# Patient Record
Sex: FEMALE | Race: WHITE | NOT HISPANIC OR LATINO | Employment: OTHER | URBAN - METROPOLITAN AREA
[De-identification: names, ages, dates, MRNs, and addresses within clinical notes are randomized per-mention and may not be internally consistent; named-entity substitution may affect disease eponyms.]

---

## 2017-03-23 ENCOUNTER — OFFICE VISIT (OUTPATIENT)
Dept: FAMILY MEDICINE | Facility: CLINIC | Age: 72
End: 2017-03-23
Payer: COMMERCIAL

## 2017-03-23 VITALS
TEMPERATURE: 98.2 F | DIASTOLIC BLOOD PRESSURE: 66 MMHG | HEIGHT: 65 IN | HEART RATE: 64 BPM | OXYGEN SATURATION: 98 % | BODY MASS INDEX: 21.33 KG/M2 | SYSTOLIC BLOOD PRESSURE: 118 MMHG | WEIGHT: 128 LBS

## 2017-03-23 DIAGNOSIS — L57.0 AK (ACTINIC KERATOSIS): ICD-10-CM

## 2017-03-23 DIAGNOSIS — Z00.00 ROUTINE GENERAL MEDICAL EXAMINATION AT A HEALTH CARE FACILITY: ICD-10-CM

## 2017-03-23 DIAGNOSIS — Z12.83 SKIN CANCER SCREENING: ICD-10-CM

## 2017-03-23 DIAGNOSIS — Z11.59 NEED FOR HEPATITIS C SCREENING TEST: ICD-10-CM

## 2017-03-23 DIAGNOSIS — H01.002 BLEPHARITIS OF LOWER EYELIDS OF BOTH EYES, UNSPECIFIED TYPE: ICD-10-CM

## 2017-03-23 DIAGNOSIS — Z23 NEED FOR VACCINATION WITH 13-POLYVALENT PNEUMOCOCCAL CONJUGATE VACCINE: ICD-10-CM

## 2017-03-23 DIAGNOSIS — H01.005 BLEPHARITIS OF LOWER EYELIDS OF BOTH EYES, UNSPECIFIED TYPE: ICD-10-CM

## 2017-03-23 DIAGNOSIS — F33.1 MAJOR DEPRESSIVE DISORDER, RECURRENT EPISODE, MODERATE (H): Primary | ICD-10-CM

## 2017-03-23 DIAGNOSIS — F41.9 ANXIETY: ICD-10-CM

## 2017-03-23 LAB
ANION GAP SERPL CALCULATED.3IONS-SCNC: 9 MMOL/L (ref 3–14)
BUN SERPL-MCNC: 9 MG/DL (ref 7–30)
CALCIUM SERPL-MCNC: 8.9 MG/DL (ref 8.5–10.1)
CHLORIDE SERPL-SCNC: 101 MMOL/L (ref 94–109)
CHOLEST SERPL-MCNC: 188 MG/DL
CO2 SERPL-SCNC: 28 MMOL/L (ref 20–32)
CREAT SERPL-MCNC: 0.71 MG/DL (ref 0.52–1.04)
GFR SERPL CREATININE-BSD FRML MDRD: 82 ML/MIN/1.7M2
GLUCOSE SERPL-MCNC: 81 MG/DL (ref 70–99)
HDLC SERPL-MCNC: 64 MG/DL
HGB BLD-MCNC: 13.8 G/DL (ref 11.7–15.7)
LDLC SERPL CALC-MCNC: 111 MG/DL
NONHDLC SERPL-MCNC: 124 MG/DL
POTASSIUM SERPL-SCNC: 4.2 MMOL/L (ref 3.4–5.3)
SODIUM SERPL-SCNC: 138 MMOL/L (ref 133–144)
TRIGL SERPL-MCNC: 66 MG/DL

## 2017-03-23 PROCEDURE — 80048 BASIC METABOLIC PNL TOTAL CA: CPT | Performed by: FAMILY MEDICINE

## 2017-03-23 PROCEDURE — 80061 LIPID PANEL: CPT | Performed by: FAMILY MEDICINE

## 2017-03-23 PROCEDURE — 36415 COLL VENOUS BLD VENIPUNCTURE: CPT | Performed by: FAMILY MEDICINE

## 2017-03-23 PROCEDURE — 99397 PER PM REEVAL EST PAT 65+ YR: CPT | Mod: 25 | Performed by: FAMILY MEDICINE

## 2017-03-23 PROCEDURE — 90670 PCV13 VACCINE IM: CPT | Performed by: FAMILY MEDICINE

## 2017-03-23 PROCEDURE — 85018 HEMOGLOBIN: CPT | Performed by: FAMILY MEDICINE

## 2017-03-23 PROCEDURE — G0009 ADMIN PNEUMOCOCCAL VACCINE: HCPCS | Performed by: FAMILY MEDICINE

## 2017-03-23 ASSESSMENT — ANXIETY QUESTIONNAIRES
2. NOT BEING ABLE TO STOP OR CONTROL WORRYING: NOT AT ALL
7. FEELING AFRAID AS IF SOMETHING AWFUL MIGHT HAPPEN: NOT AT ALL
3. WORRYING TOO MUCH ABOUT DIFFERENT THINGS: NOT AT ALL
6. BECOMING EASILY ANNOYED OR IRRITABLE: SEVERAL DAYS
1. FEELING NERVOUS, ANXIOUS, OR ON EDGE: NOT AT ALL
5. BEING SO RESTLESS THAT IT IS HARD TO SIT STILL: NOT AT ALL
GAD7 TOTAL SCORE: 1

## 2017-03-23 ASSESSMENT — PATIENT HEALTH QUESTIONNAIRE - PHQ9: 5. POOR APPETITE OR OVEREATING: NOT AT ALL

## 2017-03-23 NOTE — PATIENT INSTRUCTIONS
Fasting labs today.  Mammogram recommended.   You can call [phone] to schedule this at the [facility] in [place] (formerly the [facility]).    Referral for dermatology.    For the eyes use baby shampoo eye washes twice a day with warm compresses prior to.  Follow up with eye doctor in 2-3 weeks for routine care.      Preventive Health Recommendations    Female Ages 65 +    Yearly exam:     See your health care provider every year in order to  o Review health changes.   o Discuss preventive care.    o Review your medicines if your doctor has prescribed any.      You no longer need a yearly Pap test unless you've had an abnormal Pap test in the past 10 years. If you have vaginal symptoms, such as bleeding or discharge, be sure to talk with your provider about a Pap test.      Every 1 to 2 years, have a mammogram.  If you are over 69, talk with your health care provider about whether or not you want to continue having screening mammograms.      Every 10 years, have a colonoscopy. Or, have a yearly FIT test (stool test). These exams will check for colon cancer.       Have a cholesterol test every 5 years, or more often if your doctor advises it.       Have a diabetes test (fasting glucose) every three years. If you are at risk for diabetes, you should have this test more often.       At age 65, have a bone density scan (DEXA) to check for osteoporosis (brittle bone disease).    Shots:    Get a flu shot each year.    Get a tetanus shot every 10 years.    Talk to your doctor about your pneumonia vaccines. There are now two you should receive - Pneumovax (PPSV 23) and Prevnar (PCV 13).    Talk to your doctor about the shingles vaccine.    Talk to your doctor about the hepatitis B vaccine.    Nutrition:     Eat at least 5 servings of fruits and vegetables each day.      Eat whole-grain bread, whole-wheat pasta and brown rice instead of white grains and rice.      Talk to your  provider about Calcium and Vitamin D.     Lifestyle    Exercise at least 150 minutes a week (30 minutes a day, 5 days a week). This will help you control your weight and prevent disease.      Limit alcohol to one drink per day.      No smoking.       Wear sunscreen to prevent skin cancer.       See your dentist twice a year for an exam and cleaning.      See your eye doctor every 1 to 2 years to screen for conditions such as glaucoma, macular degeneration and cataracts.

## 2017-03-23 NOTE — MR AVS SNAPSHOT
After Visit Summary   3/23/2017    Nicki Guerra    MRN: [id number]           Patient Information     Date Of Birth          1945        Visit Information        Provider Department      3/23/2017 8:20 AM Melonie Lucia MD [facility]        Today's Diagnoses     Major depressive disorder, recurrent episode, moderate (H)    -  1    Routine general medical examination at a health care facility        Anxiety        Need for vaccination with 13-polyvalent pneumococcal conjugate vaccine        Skin cancer screening          Care Instructions    Fasting labs today.  Mammogram recommended.   You can call [phone] to schedule this at the [facility] (formerly the [facility]).    Referral for dermatology.    For the eyes use baby shampoo eye washes twice a day with warm compresses prior to.  Follow up with eye doctor in 2-3 weeks for routine care.      Preventive Health Recommendations    Female Ages 65 +    Yearly exam:     See your health care provider every year in order to  o Review health changes.   o Discuss preventive care.    o Review your medicines if your doctor has prescribed any.      You no longer need a yearly Pap test unless you've had an abnormal Pap test in the past 10 years. If you have vaginal symptoms, such as bleeding or discharge, be sure to talk with your provider about a Pap test.      Every 1 to 2 years, have a mammogram.  If you are over 69, talk with your health care provider about whether or not you want to continue having screening mammograms.      Every 10 years, have a colonoscopy. Or, have a yearly FIT test (stool test). These exams will check for colon cancer.       Have a cholesterol test every 5 years, or more often if your doctor advises it.       Have a diabetes test (fasting glucose) every three years. If you are at risk for diabetes, you should have this test more often.       At age 65,  have a bone density scan (DEXA) to check for osteoporosis (brittle bone disease).    Shots:    Get a flu shot each year.    Get a tetanus shot every 10 years.    Talk to your doctor about your pneumonia vaccines. There are now two you should receive - Pneumovax (PPSV 23) and Prevnar (PCV 13).    Talk to your doctor about the shingles vaccine.    Talk to your doctor about the hepatitis B vaccine.    Nutrition:     Eat at least 5 servings of fruits and vegetables each day.      Eat whole-grain bread, whole-wheat pasta and brown rice instead of white grains and rice.      Talk to your provider about Calcium and Vitamin D.     Lifestyle    Exercise at least 150 minutes a week (30 minutes a day, 5 days a week). This will help you control your weight and prevent disease.      Limit alcohol to one drink per day.      No smoking.       Wear sunscreen to prevent skin cancer.       See your dentist twice a year for an exam and cleaning.      See your eye doctor every 1 to 2 years to screen for conditions such as glaucoma, macular degeneration and cataracts.        Follow-ups after your visit        Additional Services     DERMATOLOGY REFERRAL       Your provider has referred you to: [facility]: [facility]  [place] [phone]   [email or web address]  [facility]: [facility] [place] [phone]   [email or web address]    Please be aware that coverage of these services is subject to the terms and limitations of your health insurance plan.  Call member services at your health plan with any benefit or coverage questions.      Please bring the following with you to your appointment:    (1) Any X-Rays, CTs or MRIs which have been performed.  Contact the facility where they were done to arrange for  prior to your scheduled appointment.    (2) List of current medications  (3) This referral request   (4) Any  "documents/labs given to you for this referral                  Who to contact     If you have questions or need follow up information about today's clinic visit or your schedule please contact [facility] directly at [phone].  Normal or non-critical lab and imaging results will be communicated to you by MyChart, letter or phone within 4 business days after the clinic has received the results. If you do not hear from us within 7 days, please contact the clinic through MyChart or phone. If you have a critical or abnormal lab result, we will notify you by phone as soon as possible.  Submit refill requests through [employer] or call your pharmacy and they will forward the refill request to us. Please allow 3 business days for your refill to be completed.          Additional Information About Your Visit        [employer] Information     [employer] gives you secure access to your electronic health record. If you see a primary care provider, you can also send messages to your care team and make appointments. If you have questions, please call your primary care clinic.  If you do not have a primary care provider, please call [phone] and they will assist you.        Care EveryWhere ID     This is your Care EveryWhere ID. This could be used by other organizations to access your [place] medical records  [id number]        Your Vitals Were     Pulse Temperature Height Pulse Oximetry BMI (Body Mass Index)       64 98.2  F (36.8  C) (Oral) 1.651 m (5' 5\") 98% 21.3 kg/m2        Blood Pressure from Last 3 Encounters:   03/23/17 118/66   07/25/16 100/64   03/16/15 114/62    Weight from Last 3 Encounters:   03/23/17 58.1 kg (128 lb)   07/25/16 56.7 kg (125 lb)   03/16/15 68 kg (150 lb)              We Performed the Following     Basic metabolic panel     DERMATOLOGY REFERRAL     Hemoglobin     Lipid panel reflex to direct LDL     Pneumococcal vaccine 13 valent PCV13 IM (Prevnar) [67248]        Primary Care Provider " Office Phone # Fax #    Melonie Lucia [phone]       [facility] [address]  [facility] [address]        Thank you!     Thank you for choosing [facility]  for your care. Our goal is always to provide you with excellent care. Hearing back from our patients is one way we can continue to improve our services. Please take a few minutes to complete the written survey that you may receive in the mail after your visit with us. Thank you!             Your Updated Medication List - Protect others around you: Learn how to safely use, store and throw away your medicines at www.disposemymeds.org.          This list is accurate as of: 3/23/17  8:44 AM.  Always use your most recent med list.                   Brand Name Dispense Instructions for use    CALCIUM + D PO      Take 1,000 mg by mouth.       FLUoxetine HCl (PMDD) 20 MG Caps      Take 1 capsule by mouth daily.       MULTIVITAMINS PO      1 daily       vitamin C CR 1000 MG Tbcr      1 TABLET DAILY       WELLBUTRIN  MG 24 hr tablet   Generic drug:  buPROPion      Take 150 mg by mouth every morning.

## 2017-03-23 NOTE — NURSING NOTE
"Chief Complaint   Patient presents with     Medicare Visit       Initial /66 (BP Location: Right arm, Patient Position: Chair, Cuff Size: Adult Regular)  Pulse 64  Temp 98.2  F (36.8  C) (Oral)  Ht 1.651 m (5' 5\")  Wt 58.1 kg (128 lb)  SpO2 98%  BMI 21.3 kg/m2 Estimated body mass index is 21.3 kg/(m^2) as calculated from the following:    Height as of this encounter: 1.651 m (5' 5\").    Weight as of this encounter: 58.1 kg (128 lb).  Medication Reconciliation: complete       Ladonna Burciaga CMA      "

## 2017-03-23 NOTE — PROGRESS NOTES
SUBJECTIVE:                                                            Nicki Guerra is a 71 year old female who presents for Preventive Visit.    Are you in the first 12 months of your Medicare Part B coverage?  No    Healthy Habits:    Do you get at least three servings of calcium containing foods daily (dairy, green leafy vegetables, etc.)? yes    Amount of exercise or daily activities, outside of work: Walking 3 day per week    Problems taking medications regularly No    Medication side effects: Slight nausea some mornings    Have you had an eye exam in the past two years? no    Do you see a dentist twice per year? no    Do you have sleep apnea, excessive snoring or daytime drowsiness?no    COGNITIVE SCREEN  1) Repeat 3 items (Banana, Sunrise, Chair)    2) Clock draw: NORMAL  3) 3 item recall: Recalls 3 objects  Results: 3 items recalled: COGNITIVE IMPAIRMENT LESS LIKELY    Mini-CogTM Copyright S Hanna. Licensed by the author for use in [facility]; reprinted with permission (lorena@[email or web address]). All rights reserved.      Concerns: None      Depression Followup    Status since last visit: Stable -ongoing care with psych.    See PHQ-9 for current symptoms.  Other associated symptoms: None    Complicating factors:   Significant life event:  No   Current substance abuse:  None  Anxiety or Panic symptoms:  No  PHQ-9 SCORE 5/15/2012 10/1/2014 3/23/2017   Total Score 6 6 -   Total Score - - 4     ALEN-7 SCORE 10/1/2014 3/23/2017   Total Score 4 -   Total Score - 1         PHQ-9  English PHQ-9   Any Language            Reviewed and updated as needed this visit by clinical staff  Tobacco  Allergies  Meds  Med Hx  Surg Hx  Fam Hx  Soc Hx        Reviewed and updated as needed this visit by Provider  Tobacco  Allergies  Meds  Med Hx  Surg Hx  Fam Hx  Soc Hx       Social History   Substance Use Topics     Smoking status: Former Smoker     Smokeless tobacco: Never Used      Comment: quit 28 yrs ago      Alcohol use No       The patient does not drink >3 drinks per day nor >7 drinks per week.    Today's PHQ-2 Score:   PHQ-2 ( 1999 Pfizer) 7/25/2016 10/1/2014   Q1: Little interest or pleasure in doing things 0 1   Q2: Feeling down, depressed or hopeless 0 1   PHQ-2 Score 0 2       Do you feel safe in your environment - Yes    Do you have a Health Care Directive?: Yes: Advance Directive has been received and scanned.    Current providers sharing in care for this patient include:   Patient Care Team:  Melonie Lucia MD as PCP - General      Hearing impairment: No    Ability to successfully perform activities of daily living: Yes, no assistance needed     Fall risk:  Fallen 2 or more times in the past year?: No  Any fall with injury in the past year?: No    Home safety:  none identified      The following health maintenance items are reviewed in Epic and correct as of today:  Health Maintenance   Topic Date Due     HEPATITIS C SCREENING  04/07/1963     PNEUMOCOCCAL (2 of 2 - PCV13) 08/29/2013     LIPID SCREEN Q5 YR FEMALE (SYSTEM ASSIGNED)  12/04/2014     MAMMO SCREEN Q2 YR (SYSTEM ASSIGNED)  10/01/2016     FALL RISK ASSESSMENT  07/25/2017     ADVANCE DIRECTIVE PLANNING Q5 YRS (NO INBASKET)  08/30/2017     INFLUENZA VACCINE (SYSTEM ASSIGNED)  09/01/2017     COLON CANCER SCREEN (SYSTEM ASSIGNED)  12/11/2019     TETANUS IMMUNIZATION (SYSTEM ASSIGNED)  01/19/2020     DEXA SCAN SCREENING (SYSTEM ASSIGNED)  Completed         Pneumonia Vaccine:Adults age 65+ who received Pneumovax (PPSV23) at 65 years or older: Should be given PCV13 > 1 year after their most recent PPSV23  Mammogram Screening: Patient over age 50, mutual decision to screen reflected in health maintenance.     ROS:  Constitutional, HEENT, cardiovascular, pulmonary, GI, , musculoskeletal, neuro, skin, endocrine and psych systems are negative, except as otherwise noted.  Recurring eye infections - used boric acid eye washes - primarily left eye.  Using  "hydrogen peroxide twice a day     Problem list, Medication list, Allergies, and Medical/Social/Surgical histories [facility] and updated as appropriate.  OBJECTIVE:                                                            /66 (BP Location: Right arm, Patient Position: Chair, Cuff Size: Adult Regular)  Pulse 64  Temp 98.2  F (36.8  C) (Oral)  Ht 1.651 m (5' 5\")  Wt 58.1 kg (128 lb)  SpO2 98%  BMI 21.3 kg/m2 Estimated body mass index is 21.3 kg/(m^2) as calculated from the following:    Height as of this encounter: 1.651 m (5' 5\").    Weight as of this encounter: 58.1 kg (128 lb).  EXAM:   GENERAL APPEARANCE: healthy, alert and no distress  EYES: Eyes grossly normal to inspection, PERRL, conjunctivae and sclerae normal and eyelids- mild erythema along lower eyelids - no hordelum noted.    HENT: ear canals and TM's normal, nose and mouth without ulcers or lesions, oropharynx clear and oral mucous membranes moist  NECK: no adenopathy, no asymmetry, masses, or scars and thyroid normal to palpation  RESP: lungs clear to auscultation - no rales, rhonchi or wheezes  BREAST: normal without masses, tenderness or nipple discharge and no palpable axillary masses or adenopathy  CV: regular rate and rhythm, normal S1 S2, no S3 or S4, no murmur, click or rub, no peripheral edema and peripheral pulses strong  ABDOMEN: soft, nontender, no hepatosplenomegaly, no masses and bowel sounds normal  MS: no musculoskeletal defects are noted and gait is age appropriate without ataxia  SKIN: no suspicious lesions or rashes - scattered nevi - knee and pretibial area with AK noted.    NEURO: Normal strength and tone, sensory exam grossly normal, mentation intact and speech normal  PSYCH: mentation appears normal and affect normal/bright    ASSESSMENT / PLAN:                                                            1. Routine general medical examination at a health care facility  Fasting.   - Hemoglobin  - Lipid panel " "reflex to direct LDL  - Basic metabolic panel    2. Major depressive disorder, recurrent episode, moderate (H)  3. Anxiety  Care with psych to continue.     4. Blepharitis of lower eyelids of both eyes, unspecified type  Topical treatment with follow up with eye MD as due for evaluation now.      5. Need for vaccination with 13-polyvalent pneumococcal conjugate vaccine  - Pneumococcal vaccine 13 valent PCV13 IM (Prevnar) [12133]    6. Skin cancer screening  Scattered nevi.    - DERMATOLOGY REFERRAL    7. Need for hepatitis C screening test  - Hepatitis C Screen Reflex to HCV RNA Quant and Genotype    End of Life Planning:  Patient currently has an advanced directive: No.  I have verified the patient's ablity to prepare an advanced directive/make health care decisions.  Literature was provided to assist patient in preparing an advanced directive.    COUNSELING:  Reviewed preventive health counseling, as reflected in patient instructions       Regular exercise       Healthy diet/nutrition       Vision screening       Hearing screening       Dental care       Vaccinated for: Pneumococcal       Osteoporosis Prevention/Bone Health       Hepatitis C screening        Estimated body mass index is 21.3 kg/(m^2) as calculated from the following:    Height as of this encounter: 1.651 m (5' 5\").    Weight as of this encounter: 58.1 kg (128 lb).  Weight management plan noted, stable and monitoring after loss a few years ago.    Wt Readings from Last 5 Encounters:   03/23/17 58.1 kg (128 lb)   07/25/16 56.7 kg (125 lb)   03/16/15 68 kg (150 lb)   10/01/14 68.1 kg (150 lb 1.6 oz)   12/10/13 71 kg (156 lb 9.6 oz)      reports that she has quit smoking. She has never used smokeless tobacco.      Appropriate preventive services were discussed with this patient, including applicable screening as appropriate for cardiovascular disease, diabetes, osteopenia/osteoporosis, and glaucoma.  As appropriate for age/gender, discussed screening " for colorectal cancer, prostate cancer, breast cancer, and cervical cancer. Checklist reviewing preventive services available has been given to the patient.    Reviewed patients plan of care and provided an AVS. The Basic Care Plan (routine screening as documented in Health Maintenance) for Nicki meets the Care Plan requirement. This Care Plan has been established and reviewed with the Patient.    Counseling Resources:  ATP IV Guidelines  Pooled Cohorts Equation Calculator  Breast Cancer Risk Calculator  FRAX Risk Assessment  ICSI Preventive Guidelines  Dietary Guidelines for Americans, 2010  USDA's MyPlate  ASA Prophylaxis  Lung CA Screening    Melonie Lucia MD  [facility]    Patient Instructions   Fasting labs today.  Mammogram recommended.   You can call [phone] to schedule this at the [facility] in [place] (formerly the [facility]).    Referral for dermatology.    For the eyes use baby shampoo eye washes twice a day with warm compresses prior to.  Follow up with eye doctor in 2-3 weeks for routine care.

## 2017-03-24 ASSESSMENT — PATIENT HEALTH QUESTIONNAIRE - PHQ9: SUM OF ALL RESPONSES TO PHQ QUESTIONS 1-9: 4

## 2017-03-24 ASSESSMENT — ANXIETY QUESTIONNAIRES: GAD7 TOTAL SCORE: 1

## 2017-03-25 PROBLEM — L57.0 AK (ACTINIC KERATOSIS): Status: ACTIVE | Noted: 2017-03-25

## 2017-03-26 NOTE — PROGRESS NOTES
Your cholesterol is improved from previous and at a good level for you.  Your blood sugar is normal.  Kidney function is normal.   Your hemoglobin is normal.  Please call or MyChart message me if you have any questions.    TRICIA

## 2017-03-28 ENCOUNTER — RADIANT APPOINTMENT (OUTPATIENT)
Dept: MAMMOGRAPHY | Facility: CLINIC | Age: 72
End: 2017-03-28
Attending: FAMILY MEDICINE
Payer: COMMERCIAL

## 2017-03-28 DIAGNOSIS — Z12.31 ENCOUNTER FOR SCREENING MAMMOGRAM FOR MALIGNANT NEOPLASM OF BREAST: ICD-10-CM

## 2017-03-28 PROCEDURE — G0202 SCR MAMMO BI INCL CAD: HCPCS | Performed by: RADIOLOGY

## 2017-04-06 DIAGNOSIS — Z11.59 NEED FOR HEPATITIS C SCREENING TEST: ICD-10-CM

## 2017-04-06 LAB — HCV AB SERPL QL IA: NORMAL

## 2017-04-06 PROCEDURE — 36415 COLL VENOUS BLD VENIPUNCTURE: CPT | Performed by: FAMILY MEDICINE

## 2017-04-06 PROCEDURE — 86803 HEPATITIS C AB TEST: CPT | Performed by: FAMILY MEDICINE

## 2017-04-07 NOTE — PROGRESS NOTES
Hepatitis C screening is negative/normal.  Please call or MyChart message me if you have any questions.    PSK

## 2017-05-11 ENCOUNTER — OFFICE VISIT (OUTPATIENT)
Dept: DERMATOLOGY | Facility: CLINIC | Age: 72
End: 2017-05-11
Attending: FAMILY MEDICINE
Payer: COMMERCIAL

## 2017-05-11 DIAGNOSIS — D22.9 NEVUS SEBACEUS OF JADASSOHN: Chronic | ICD-10-CM

## 2017-05-11 DIAGNOSIS — D48.5 NEOPLASM OF UNCERTAIN BEHAVIOR OF SKIN: Primary | ICD-10-CM

## 2017-05-11 DIAGNOSIS — L82.1 SEBORRHEIC KERATOSIS: ICD-10-CM

## 2017-05-11 DIAGNOSIS — D22.9 MULTIPLE BENIGN NEVI: ICD-10-CM

## 2017-05-11 DIAGNOSIS — D18.01 CHERRY ANGIOMA: ICD-10-CM

## 2017-05-11 DIAGNOSIS — Q82.5 PORT WINE STAIN: Chronic | ICD-10-CM

## 2017-05-11 PROCEDURE — 88305 TISSUE EXAM BY PATHOLOGIST: CPT | Performed by: DERMATOLOGY

## 2017-05-11 PROCEDURE — 99203 OFFICE O/P NEW LOW 30 MIN: CPT | Mod: 25 | Performed by: DERMATOLOGY

## 2017-05-11 PROCEDURE — 11100 HC BIOPSY SKIN/SUBQ/MUC MEM, SINGLE LESION: CPT | Performed by: DERMATOLOGY

## 2017-05-11 RX ORDER — LIDOCAINE HYDROCHLORIDE AND EPINEPHRINE 10; 10 MG/ML; UG/ML
3 INJECTION, SOLUTION INFILTRATION; PERINEURAL ONCE
Qty: 0.5 ML | Refills: 0 | OUTPATIENT
Start: 2017-05-11 | End: 2017-05-11

## 2017-05-11 NOTE — PROGRESS NOTES
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 samuel          History of Present Illness:  Ms. Nicki Guerra is a 72 year old female who presents as a referral from Dr. Diaz for moles. Today, the patient reports a history of a burn on her face from using a sun lamp just on her face. She has 2 birth marks, 1 on the left upper arm and 1 on the scalp. The patient reports no other lesions of concern.    Past Medical History:   Patient Active Problem List   Diagnosis     Sciatica     Lateral epicondylitis     Enthesopathy of hip region     Osteoporosis     Moderate major depression (H)     CARDIOVASCULAR SCREENING; LDL GOAL LESS THAN 160     Anxiety     Advanced directives, counseling/discussion     AK (actinic keratosis)     Past Medical History:   Diagnosis Date     Allergic rhinitis, cause unspecified      Depressive disorder, not elsewhere classified      Enthesopathy of hip region      Family history of psychiatric condition      Head injury, unspecified      Hypotension, unspecified      Lateral epicondylitis  of elbow      Premenopausal menorrhagia      Sciatica      Past Surgical History:   Procedure Laterality Date     HC DILATION/CURETTAGE DIAG/THER NON OB  1969     Social History:  The patient denies use of tanning beds. The patient has an occasional glass of wine 1-2X/year and does not use tobacco.     Family History:  There is no family history of skin cancer.    Medications:  Current Outpatient Prescriptions   Medication Sig Dispense Refill     buPROPion (WELLBUTRIN XL) 150 MG 24 hr tablet Take 150 mg by mouth every morning.       FLUoxetine HCl, PMDD, 20 MG CAPS Take 1 capsule by mouth daily.        VITAMIN C CR 1000 MG OR TBCR 1 TABLET DAILY       MULTIVITAMINS OR 1 daily       CALCIUM + D OR Take 1,000 mg by mouth.       Allergies   Allergen Reactions     Ibuprofen Swelling     Penicillins Hives     Review of Systems:  -Const: The patient is generally feeling well today.   -Skin: As above in HPI. No additional skin concerns.    Physical exam:  Vitals: There were no vitals taken for this visit.  GEN: This is a well-nourished, well developed female in no acute distress  NEURO: Alert and oriented  PSYCH: in a pleasant mood, appropriate affect  SKIN: Total skin excluding the undergarment areas was performed. The exam included the head/face, neck, both arms, chest, back, abdomen, both legs, digits and/or nails.   -Multiple regular brown pigmented macules and papules are identified on the trunk and extremities.   -There are waxy stuck on tan to brown papules on examined surfaces with a large one on the L temple within the hair bearing areas that appears partially treated centrally.  -Red vascular patch on the left upper arm.   -Hyperkeratotic papule with central depression on the right upper arm.   -Nevus Sebaceous 3cm at widest diameter on the left occipital scalp.   -There is a bright red dome shaped papule on the left lower abdomen.   -No other lesions of concern on areas examined.     Impression/Plan:  1. Port wine stain, left upper arm.     No further intervention required at this time.   2. Seborrheic keratosis, back and legs    Discussed benign nature, no further intervention required at this time.   3. Multiple clinically benign nevi on the trunk and extremities    Discussed benign nature, no further intervention required at this time.   4. 3cm nevus sebaceous, left occipital scalp, no neoplasms within lesion.    No further intervention required at this time.    Will need to monitor this as rarely new neoplasms can develop such asTtrichoblastoma/Trichoepithelioma (Benign) or even more rarely a BCC.    5. Cherry angioma, left lower abdomen    No further intervention required at this time.   6. Hyperkeratotic papule with central depression, right upper arm. Neoplasm of uncertain behavior. Differential diagnosis includes sclerotic basal versus AK versus SK    Shave biopsy:  After discussion of benefits and risks including but not limited to bleeding/bruising, pain/swelling, infection, scar, incomplete removal, nerve damage/numbness, recurrence, and non-diagnostic biopsy, written consent, verbal consent and photographs were obtained. Time-out was performed. The area was cleaned with isopropyl alcohol. 0.5 mL of 1% lidocaine with epinephrine was injected to obtain adequate anesthesia of the lesion on the right upper arm. A shave biopsy was performed. Hemostasis was achieved with aluminium chloride. Vaseline and a sterile dressing were applied. The patient tolerated the procedure and no complications were noted. The patient was provided with verbal and written post care instructions.     CC Dr. Lucia on close of this encounter.  Follow-up prn for new or changing lesions, pending biopsy result.     Staff Involved:  Scribe/Staff    Scribe Disclosure:   I, Leelee Saenz, am serving as a scribe to document services personally performed by Dr. Eliseo Roe, based on data collection and the provider's statements to me.     Provider Disclosure:   I have reviewed the documentation recorded by the scribe and have edited it as needed. I have personally performed the services documented here and the documentation accurately represents those services and the decisions made by me.     Eliseo Roe MD, MS    Department of Dermatology  [facility]: Phone: [phone], Fax:[phone]  [facility]: Phone: [phone], Fax: [phone]

## 2017-05-11 NOTE — PATIENT INSTRUCTIONS
Cherry angiomas: the red dots, harmless blood vessel growths  Port wine stain: vascular birth maciej on L arm  Nevus sebaceous: birth maciej on back of head. If anything grows on here, please get it checked out with a dermatologist.  Seborrheic keratosis: harmless crusty spots.  Nevus/Nevi (plural): These are moles.      Wound Care After a Biopsy    What is a skin biopsy?  A skin biopsy allows the doctor to examine a very small piece of tissue under the microscope to determine the diagnosis and the best treatment for the skin condition. A local anesthetic (numbing medicine)  is injected with a very small needle into the skin area to be tested. A small piece of skin is taken from the area. Sometimes a suture (stitch) is used.     What are the risks of a skin biopsy?  I will experience scar, bleeding, swelling, pain, crusting and redness. I may experience incomplete removal or recurrence. Risks of this procedure are excessive bleeding, bruising, infection, nerve damage, numbness, thick (hypertrophic or keloidal) scar and non-diagnostic biopsy.    How should I care for my wound for the first 24 hours?    Keep the wound dry and covered for 24 hours    If it bleeds, hold direct pressure on the area for 15 minutes. If bleeding does not stop then go to the emergency room    Avoid strenuous exercise the first 1-2 days or as your doctor instructs you    How should I care for the wound after 24 hours?    After 24 hours, remove the bandage    You may bathe or shower as normal    If you had a scalp biopsy, you can shampoo as usual and can use shower water to clean the biopsy site daily    Clean the wound twice a day with gentle soap and water    Do not scrub, be gentle    Apply white petroleum/Vaseline after cleaning the wound with a cotton swab or a clean finger, and keep the site covered with a Bandaid /bandage. Bandages are not necessary with a scalp biopsy    If you are unable to cover the site with a Bandaid /bandage, re-apply  ointment 2-3 times a day to keep the site moist. Moisture will help with healing    Avoid strenuous activity for first 1-2 days    Avoid lakes, rivers, pools, and oceans until the stitches are removed or the site is healed    How do I clean my wound?    Wash hands for 15 minutes with soap or use hand  before all wound care    Clean the wound with gentle soap and water    Apply white petroleum/Vaseline  to wound after it is clean    Replace the Bandaid /bandage to keep the wound covered for the first few days or as instructed by your doctor    If you had a scalp biopsy, warm shower water to the area on a daily basis should suffice    What should I use to clean my wound?     Cotton-tipped applicators (Qtips )    White petroleum jelly (Vaseline ). Use a clean new container and use Q-tips to apply.    Bandaids   as needed    Gentle soap     How should I care for my wound long term?    Do not get your wound dirty    Keep up with wound care for one week or until the area is healed.    A small scab will form and fall off by itself when the area is completely healed. The area will be red and will become pink in color as it heals. Sun protection is very important for how your scar will turn out. Sunscreen with an SPF 30 or greater is recommended once the area is healed.    You should have some soreness but it should be mild and slowly go away over several days. Talk to your doctor about using tylenol for pain,    When should I call my doctor?  If you have increased:     Pain or swelling    Pus or drainage (clear or slightly yellow drainage is ok)    Temperature over 100F    Spreading redness or warmth around wound    When will I hear about my results?  The biopsy results can take 2-3 weeks to come back. The clinic will call you with the results, send you a [employer] message, or have you schedule a follow-up clinic or phone time to discuss the results. Contact our clinics if you do not hear from us in 3 weeks.     Who  should I call with questions?    [facility]: [phone]     [facility]: [phone]    For urgent needs outside of business hours call the [facility] at [phone] and ask for the dermatology resident on call    Dry Skin    What is dry skin?    Common skin problem    Can be worse during the winter     Affects all ages    Occurs in people with or without other skin problems    What does it look like?    Fine lines in the skin become more visible     Rough feeling skin     Flaky skin    Most common on the arms and legs    Skin can become cracked, especially on the hands and feet    What are some problems caused by dry skin?     Itching    Rubbing or scratching can cause thickened, rough skin patches    Cracks in skin can be painful    Red, itchy, scaly skin (called eczema) can occur    Yellow crusting or pus could be signs of an infection    What causes dry skin?    A lack of water in the top layer of the skin    Too much soapy water,  hot water, or harsh chemicals    Aging and sun damage    How do I treat dry skin?    Shower or bathe daily for under ten minutes with lukewarm water and mild soap.    Pat yourself dry with a towel gently and leave your skin slightly damp.    Use moisturizing cream or ointment right away.  Avoid lotions.    What kind of mild soap should I be using?    Camay , Dove , Tone , Neutrogena , Purpose , or Oil of Olay     A non-detergent cleanser, like Cetaphil , can be used.    What should I stay away from?    Scented soaps     Bath oils    What moisturizers should I be using?    Cetaphil Cream,CeraVe Cream, Vanicream, Aquaphilic, Eucerin, Aquaphor, or Vaseline     Always apply after showering or bathing.    Reapply throughout the day, if possible.    If dry skin affects your hands, always reapply after handwashing.    What else should I know?    Using a humidifier during winter months may help.    If dry skin gets  worse or if eczema develops, a steroid cream may be needed.

## 2017-05-11 NOTE — NURSING NOTE
Dermatology Rooming Note    Nicki Guerra's goals for this visit include:   Chief Complaint   Patient presents with     Derm Problem     skin check no skin concerns at this time Ref: dr. samuel        Is a scribe okay for this visit: yes     Are records needed for this visit(If yes, obtain release of information): No     Vitals: There were no vitals taken for this visit.    Referring Provider:  Melonie Samuel MD  [address] [place] [address]    Piper Perez CMA

## 2017-05-11 NOTE — MR AVS SNAPSHOT
After Visit Summary   5/11/2017    Nicki Guerra    MRN: [id number]           Patient Information     Date Of Birth          1945        Visit Information        Provider Department      5/11/2017 8:00 AM Eliseo Roe MD [facility]        Today's Diagnoses     Neoplasm of uncertain behavior of skin    -  1    Multiple benign nevi        Seborrheic keratosis        Port wine stain        Nevus sebaceus of [facility]        [place] angioma          Care Instructions    Cherry angiomas: the red dots, harmless blood vessel growths  Port wine stain: vascular birth maciej on L arm  Nevus sebaceous: birth maciej on back of head. If anything grows on here, please get it checked out with a dermatologist.  Seborrheic keratosis: harmless crusty spots.  Nevus/Nevi (plural): These are moles.      Wound Care After a Biopsy    What is a skin biopsy?  A skin biopsy allows the doctor to examine a very small piece of tissue under the microscope to determine the diagnosis and the best treatment for the skin condition. A local anesthetic (numbing medicine)  is injected with a very small needle into the skin area to be tested. A small piece of skin is taken from the area. Sometimes a suture (stitch) is used.     What are the risks of a skin biopsy?  I will experience scar, bleeding, swelling, pain, crusting and redness. I may experience incomplete removal or recurrence. Risks of this procedure are excessive bleeding, bruising, infection, nerve damage, numbness, thick (hypertrophic or keloidal) scar and non-diagnostic biopsy.    How should I care for my wound for the first 24 hours?    Keep the wound dry and covered for 24 hours    If it bleeds, hold direct pressure on the area for 15 minutes. If bleeding does not stop then go to the emergency room    Avoid strenuous exercise the first 1-2 days or as your doctor instructs you    How should I care for the wound after 24 hours?    After 24 hours,  remove the bandage    You may bathe or shower as normal    If you had a scalp biopsy, you can shampoo as usual and can use shower water to clean the biopsy site daily    Clean the wound twice a day with gentle soap and water    Do not scrub, be gentle    Apply white petroleum/Vaseline after cleaning the wound with a cotton swab or a clean finger, and keep the site covered with a Bandaid /bandage. Bandages are not necessary with a scalp biopsy    If you are unable to cover the site with a Bandaid /bandage, re-apply ointment 2-3 times a day to keep the site moist. Moisture will help with healing    Avoid strenuous activity for first 1-2 days    Avoid lakes, rivers, pools, and oceans until the stitches are removed or the site is healed    How do I clean my wound?    Wash hands for 15 minutes with soap or use hand  before all wound care    Clean the wound with gentle soap and water    Apply white petroleum/Vaseline  to wound after it is clean    Replace the Bandaid /bandage to keep the wound covered for the first few days or as instructed by your doctor    If you had a scalp biopsy, warm shower water to the area on a daily basis should suffice    What should I use to clean my wound?     Cotton-tipped applicators (Qtips )    White petroleum jelly (Vaseline ). Use a clean new container and use Q-tips to apply.    Bandaids   as needed    Gentle soap     How should I care for my wound long term?    Do not get your wound dirty    Keep up with wound care for one week or until the area is healed.    A small scab will form and fall off by itself when the area is completely healed. The area will be red and will become pink in color as it heals. Sun protection is very important for how your scar will turn out. Sunscreen with an SPF 30 or greater is recommended once the area is healed.    You should have some soreness but it should be mild and slowly go away over several days. Talk to your doctor about using tylenol for  pain,    When should I call my doctor?  If you have increased:     Pain or swelling    Pus or drainage (clear or slightly yellow drainage is ok)    Temperature over 100F    Spreading redness or warmth around wound    When will I hear about my results?  The biopsy results can take 2-3 weeks to come back. The clinic will call you with the results, send you a mychart message, or have you schedule a follow-up clinic or phone time to discuss the results. Contact our clinics if you do not hear from us in 3 weeks.     Who should I call with questions?    [facility]: [phone]     [facility]: [phone]    For urgent needs outside of business hours call the [facility] at [phone] and ask for the dermatology resident on call    Dry Skin    What is dry skin?    Common skin problem    Can be worse during the winter     Affects all ages    Occurs in people with or without other skin problems    What does it look like?    Fine lines in the skin become more visible     Rough feeling skin     Flaky skin    Most common on the arms and legs    Skin can become cracked, especially on the hands and feet    What are some problems caused by dry skin?     Itching    Rubbing or scratching can cause thickened, rough skin patches    Cracks in skin can be painful    Red, itchy, scaly skin (called eczema) can occur    Yellow crusting or pus could be signs of an infection    What causes dry skin?    A lack of water in the top layer of the skin    Too much soapy water,  hot water, or harsh chemicals    Aging and sun damage    How do I treat dry skin?    Shower or bathe daily for under ten minutes with lukewarm water and mild soap.    Pat yourself dry with a towel gently and leave your skin slightly damp.    Use moisturizing cream or ointment right away.  Avoid lotions.    What kind of mild soap should I be using?    Camay , Dove , Tone , Neutrogena , Purpose , or Oil  of Olay     A non-detergent cleanser, like Cetaphil , can be used.    What should I stay away from?    Scented soaps     Bath oils    What moisturizers should I be using?    Cetaphil Cream,CeraVe Cream, Vanicream, Aquaphilic, Eucerin, Aquaphor, or Vaseline     Always apply after showering or bathing.    Reapply throughout the day, if possible.    If dry skin affects your hands, always reapply after handwashing.    What else should I know?    Using a humidifier during winter months may help.    If dry skin gets worse or if eczema develops, a steroid cream may be needed.          Follow-ups after your visit        Who to contact     If you have questions or need follow up information about today's clinic visit or your schedule please contact [facility] directly at [phone].  Normal or non-critical lab and imaging results will be communicated to you by [employer], letter or phone within 4 business days after the clinic has received the results. If you do not hear from us within 7 days, please contact the clinic through [employer] or phone. If you have a critical or abnormal lab result, we will notify you by phone as soon as possible.  Submit refill requests through [employer] or call your pharmacy and they will forward the refill request to us. Please allow 3 business days for your refill to be completed.          Additional Information About Your Visit        [employer] Information     [employer] gives you secure access to your electronic health record. If you see a primary care provider, you can also send messages to your care team and make appointments. If you have questions, please call your primary care clinic.  If you do not have a primary care provider, please call [phone] and they will assist you.      [employer] is an electronic gateway that provides easy, online access to your medical records. With [employer], you can request a clinic appointment, read your test results, renew a prescription or  communicate with your care team.     To access your existing account, please contact your [facility] or call [phone] for assistance.        Care EveryWhere ID     This is your Care EveryWhere ID. This could be used by other organizations to access your [facility] medical records  [id number]         Blood Pressure from Last 3 Encounters:   03/23/17 118/66   07/25/16 100/64   03/16/15 114/62    Weight from Last 3 Encounters:   03/23/17 58.1 kg (128 lb)   07/25/16 56.7 kg (125 lb)   03/16/15 68 kg (150 lb)              We Performed the Following     BIOPSY SKIN/SUBQ/MUC MEM, SINGLE LESION     Surgical pathology exam          Today's Medication Changes          These changes are accurate as of: 5/11/17  8:22 AM.  If you have any questions, ask your nurse or doctor.               Start taking these medicines.        Dose/Directions    lidocaine 1% with EPINEPHrine 1:100,000 1 %-1:636602 injection   Used for:  Neoplasm of uncertain behavior of skin        Dose:  3 mL   Inject 3 mLs into the skin once for 1 dose   Quantity:  0.5 mL   Refills:  0            Where to get your medicines      Some of these will need a paper prescription and others can be bought over the counter.  Ask your nurse if you have questions.     You don't need a prescription for these medications     lidocaine 1% with EPINEPHrine 1:100,000 1 %-1:104108 injection                Primary Care Provider Office Phone # Fax #    Melonie Lucia [phone]       [address]  [facility] [address]        Thank you!     Thank you for choosing [facility]  for your care. Our goal is always to provide you with excellent care. Hearing back from our patients is one way we can continue to improve our services. Please take a few minutes to complete the written survey that you may receive in the mail after your visit with us. Thank you!             Your Updated Medication  List - Protect others around you: Learn how to safely use, store and throw away your medicines at www.disposemymeds.org.          This list is accurate as of: 5/11/17  8:22 AM.  Always use your most recent med list.                   Brand Name Dispense Instructions for use    CALCIUM + D PO      Take 1,000 mg by mouth.       FLUoxetine HCl (PMDD) 20 MG Caps      Take 1 capsule by mouth daily.       lidocaine 1% with EPINEPHrine 1:100,000 1 %-1:993520 injection     0.5 mL    Inject 3 mLs into the skin once for 1 dose       MULTIVITAMINS PO      1 daily       vitamin C CR 1000 MG Tbcr      1 TABLET DAILY       WELLBUTRIN  MG 24 hr tablet   Generic drug:  buPROPion      Take 150 mg by mouth every morning.

## 2017-05-11 NOTE — LETTER
5/11/2017       RE: Nicki Guerra  [address]   [facility] [place] [address]     Dear Colleague,    Thank you for referring your patient, Nicki Guerra, to the [facility] at [facility]. Please see a copy of my visit note below.    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 samuel          History of Present Illness:  Ms. Nicki Guerra is a 72 year old female who presents as a referral from Dr. Diaz for moles. Today, the patient reports a history of a burn on her face from using a sun lamp just on her face. She has 2 birth marks, 1 on the left upper arm and 1 on the scalp. The patient reports no other lesions of concern.    Past Medical History:   Patient Active Problem List   Diagnosis     Sciatica     Lateral epicondylitis     Enthesopathy of hip region     Osteoporosis     Moderate major depression (H)     CARDIOVASCULAR SCREENING; LDL GOAL LESS THAN 160     Anxiety     Advanced directives, counseling/discussion     AK (actinic keratosis)     Past Medical History:   Diagnosis Date     Allergic rhinitis, cause unspecified      Depressive disorder, not elsewhere classified      Enthesopathy of hip region      Family history of psychiatric condition      Head injury, unspecified      Hypotension, unspecified      Lateral epicondylitis  of elbow      Premenopausal menorrhagia      Sciatica      Past Surgical History:   Procedure Laterality Date     HC DILATION/CURETTAGE DIAG/THER NON OB  1969     Social History:  The patient denies use of tanning beds. The patient has an occasional glass of wine 1-2X/year and does not use tobacco.      Family History:  There is no family history of skin cancer.    Medications:  Current Outpatient Prescriptions   Medication Sig Dispense Refill     buPROPion (WELLBUTRIN XL) 150 MG 24 hr tablet Take 150 mg by mouth every morning.       FLUoxetine HCl, PMDD, 20 MG CAPS Take 1 capsule by mouth daily.       VITAMIN C CR 1000 MG OR TBCR 1 TABLET DAILY       MULTIVITAMINS OR 1 daily       CALCIUM + D OR Take 1,000 mg by mouth.       Allergies   Allergen Reactions     Ibuprofen Swelling     Penicillins Hives     Review of Systems:  -Const: The patient is generally feeling well today.   -Skin: As above in HPI. No additional skin concerns.    Physical exam:  Vitals: There were no vitals taken for this visit.  GEN: This is a well-nourished, well developed female in no acute distress  NEURO: Alert and oriented  PSYCH: in a pleasant mood, appropriate affect  SKIN: Total skin excluding the undergarment areas was performed. The exam included the head/face, neck, both arms, chest, back, abdomen, both legs, digits and/or nails.   -Multiple regular brown pigmented macules and papules are identified on the trunk and extremities.   -There are waxy stuck on tan to brown papules on examined surfaces with a large one on the L temple within the hair bearing areas that appears partially treated centrally.  -Red vascular patch on the left upper arm.   -Hyperkeratotic papule with central depression on the right upper arm.   -Nevus Sebaceous 3cm at widest diameter on the left occipital scalp.   -There is a bright red dome shaped papule on the left lower abdomen.   -No other lesions of concern on areas examined.     Impression/Plan:  1. Port wine stain, left upper arm.     No further intervention required at this time.   2. Seborrheic keratosis, back and legs    Discussed benign nature, no further intervention required at this time.   3. Multiple clinically benign nevi on the trunk and extremities    Discussed benign nature, no further  intervention required at this time.   4. 3cm nevus sebaceous, left occipital scalp, no neoplasms within lesion.    No further intervention required at this time.    Will need to monitor this as rarely new neoplasms can develop such asTtrichoblastoma/Trichoepithelioma (Benign) or even more rarely a BCC.   5. Cherry angioma, left lower abdomen    No further intervention required at this time.   6. Hyperkeratotic papule with central depression, right upper arm. Neoplasm of uncertain behavior. Differential diagnosis includes sclerotic basal versus AK versus SK    Shave biopsy:  After discussion of benefits and risks including but not limited to bleeding/bruising, pain/swelling, infection, scar, incomplete removal, nerve damage/numbness, recurrence, and non-diagnostic biopsy, written consent, verbal consent and photographs were obtained. Time-out was performed. The area was cleaned with isopropyl alcohol. 0.5 mL of 1% lidocaine with epinephrine was injected to obtain adequate anesthesia of the lesion on the right upper arm. A shave biopsy was performed. Hemostasis was achieved with aluminium chloride. Vaseline and a sterile dressing were applied. The patient tolerated the procedure and no complications were noted. The patient was provided with verbal and written post care instructions.     CC Dr. Lucia on close of this encounter.  Follow-up prn for new or changing lesions, pending biopsy result.     Staff Involved:  Scribe/Staff    Scribe Disclosure:   I, Leelee Saenz, am serving as a scribe to document services personally performed by Dr. Eliseo Roe, based on data collection and the provider's statements to me.     Provider Disclosure:   I have reviewed the documentation recorded by the scribe and have edited it as needed. I have personally performed the services documented here and the documentation accurately represents those services and the decisions made by me.     Eliseo Roe MD, MS    Department  of Dermatology  [facility]: Phone: [phone], Fax:[phone]  [facility]: Phone: [phone], Fax: [phone]

## 2017-05-15 LAB — COPATH REPORT: NORMAL

## 2017-05-16 ENCOUNTER — TELEPHONE (OUTPATIENT)
Dept: DERMATOLOGY | Facility: CLINIC | Age: 72
End: 2017-05-16

## 2017-05-16 NOTE — PROGRESS NOTES
Please call patient to report that lesion on right upper arm is an Actinic Keratosis.     Often times the biopsy and healing response will take care of the lesion. If it doesn't self resolve in a month or 2, please come back to clinic to get it treated.      Eliseo Roe MD, MS    Department of Dermatology  [facility]: Phone: [phone], Fax:[phone]  [facility]: Phone: [phone], Fax: [phone]

## 2017-05-16 NOTE — TELEPHONE ENCOUNTER
Left message for patient to call  "[employer]" in [place] back at [phone] in regards to results below     Notes Recorded by Eliseo Roe MD on 5/16/2017 at 7:51 AM  Please call patient to report that lesion on right upper arm is an Actinic Keratosis.     Often times the biopsy and healing response will take care of the lesion. If it doesn't self resolve in a month or 2, please come back to clinic to get it treated.      Eliseo Roe MD, MS    Department of Dermatology  [facility]: Phone: [phone], Fax:[phone]  [facility]: Phone: [phone], Fax: [phone]          5d ago       Copath Report Patient Name: EMI JEFFREY   MR#: [id number]   Specimen #: [id number]   Collected: 5/11/2017   Received: 5/11/2017   Reported: 5/15/2017 15:05   Ordering Phy(s): ELISEO ROE     For improved result formatting, select 'View Enhanced Report Format'   under Linked Documents section.     SPECIMEN(S):   Skin, right upper arm     FINAL DIAGNOSIS:   Skin, upper arm, right:   - Actinic keratosis - (see description)                    Chela Barber LPN

## 2017-05-16 NOTE — TELEPHONE ENCOUNTER
Patient returned call and I informed her of her results below.  She will call if she has any further concerns.    Neeru Juan, CMA

## 2017-07-17 ENCOUNTER — OFFICE VISIT (OUTPATIENT)
Dept: FAMILY MEDICINE | Facility: CLINIC | Age: 72
End: 2017-07-17
Payer: COMMERCIAL

## 2017-07-17 VITALS
HEART RATE: 60 BPM | OXYGEN SATURATION: 98 % | SYSTOLIC BLOOD PRESSURE: 104 MMHG | WEIGHT: 127.6 LBS | TEMPERATURE: 98.2 F | DIASTOLIC BLOOD PRESSURE: 58 MMHG | BODY MASS INDEX: 21.23 KG/M2

## 2017-07-17 DIAGNOSIS — M54.42 ACUTE LEFT-SIDED LOW BACK PAIN WITH LEFT-SIDED SCIATICA: ICD-10-CM

## 2017-07-17 DIAGNOSIS — K13.0 CHEILITIS: Primary | ICD-10-CM

## 2017-07-17 PROCEDURE — 99214 OFFICE O/P EST MOD 30 MIN: CPT | Performed by: FAMILY MEDICINE

## 2017-07-17 NOTE — NURSING NOTE
"Chief Complaint   Patient presents with     Swelling     Lips     Musculoskeletal Problem       Initial /58 (BP Location: Right arm, Patient Position: Sitting, Cuff Size: Adult Regular)  Pulse 60  Temp 98.2  F (36.8  C) (Oral)  Wt 57.9 kg (127 lb 9.6 oz)  SpO2 98%  BMI 21.23 kg/m2 Estimated body mass index is 21.23 kg/(m^2) as calculated from the following:    Height as of 3/23/17: 1.651 m (5' 5\").    Weight as of this encounter: 57.9 kg (127 lb 9.6 oz).  Medication Reconciliation: complete       Ladonna Burciaga CMA      "

## 2017-07-17 NOTE — PATIENT INSTRUCTIONS
For the rash at lips - use Cutivate or Lubriderm to area twice a day to three times a day   Chapstick for use intermittently.   Consider vasoline for barrier if needed.  If cracking or moist drainage occurs, notify me.    Referral to PHYSICAL THERAPY for core strengthening recommended.  Continue tylenol as needed. Ice after activity.  Consider gentle heat if pain occurs prior to activity.  Stretching before exercise recommended.

## 2017-07-17 NOTE — MR AVS SNAPSHOT
After Visit Summary   7/17/2017    Nicki Guerra    MRN: [id number]           Patient Information     Date Of Birth          1945        Visit Information        Provider Department      7/17/2017 10:20 AM Melonie Lucia MD [facility]        Today's Diagnoses     Cheilitis    -  1    Acute left-sided low back pain with left-sided sciatica          Care Instructions    For the rash at lips - use Cutivate or Lubriderm to area twice a day to three times a day   Chapstick for use intermittently.   Consider vasoline for barrier if needed.  If cracking or moist drainage occurs, notify me.    Referral to PHYSICAL THERAPY for core strengthening recommended.  Continue tylenol as needed. Ice after activity.  Consider gentle heat if pain occurs prior to activity.  Stretching before exercise recommended.              Follow-ups after your visit        Additional Services     [facility] PT, HAND, AND CHIROPRACTIC REFERRAL       **This order will print in the [facility] Scheduling Office**    Physical Therapy, Hand Therapy and Chiropractic Care are available through:    *[facility]  *[facility]  *[facility]    Call one number to schedule at any of the above locations: [phone].    Your provider has referred you to: Physical Therapy at [facility] or [facility]    Indication/Reason for Referral: Low Back Pain  Onset of Illness: 2 months  Therapy Orders: Evaluate and Treat  Special Programs: None  Special Request: None    Erik Valencia      Additional Comments for the Therapist or Chiropractor: HEP    Please be aware that coverage of these services is subject to the terms and limitations of your health insurance plan.  Call member services at your health plan with any benefit or coverage questions.      Please bring the following to your appointment:    *Your personal calendar for scheduling future appointments  *Comfortable clothing                  Who to  contact     If you have questions or need follow up information about today's clinic visit or your schedule please contact [facility] directly at [phone].  Normal or non-critical lab and imaging results will be communicated to you by MyChart, letter or phone within 4 business days after the clinic has received the results. If you do not hear from us within 7 days, please contact the clinic through MyChart or phone. If you have a critical or abnormal lab result, we will notify you by phone as soon as possible.  Submit refill requests through [employer] or call your pharmacy and they will forward the refill request to us. Please allow 3 business days for your refill to be completed.          Additional Information About Your Visit        [employer] Information     [employer] gives you secure access to your electronic health record. If you see a primary care provider, you can also send messages to your care team and make appointments. If you have questions, please call your primary care clinic.  If you do not have a primary care provider, please call [phone] and they will assist you.        Care EveryWhere ID     This is your Care EveryWhere ID. This could be used by other organizations to access your [facility] medical records  [id number]        Your Vitals Were     Pulse Temperature Pulse Oximetry BMI (Body Mass Index)          60 98.2  F (36.8  C) (Oral) 98% 21.23 kg/m2         Blood Pressure from Last 3 Encounters:   07/17/17 104/58   03/23/17 118/66   07/25/16 100/64    Weight from Last 3 Encounters:   07/17/17 57.9 kg (127 lb 9.6 oz)   03/23/17 58.1 kg (128 lb)   07/25/16 56.7 kg (125 lb)              We Performed the Following     [facility] PT, HAND, AND CHIROPRACTIC REFERRAL        Primary Care Provider Office Phone # Fax #    Melonie Lucia [phone]       [facility] [address] [place] [address] [address]  [facility] [address]        Equal Access to Services     CARMITA COATES : Hadii aad ku  joann Leyva, wajulianada lublessingadaha, qaphyllista kacynthia dodson, haroon brittanyin hayaan mariolabharti alvarohenry lamirzaloreto [facility]. So [facility] [phone].    ATENCIÓN: Si habla español, tiene a ortega disposición servicios gratuitos de asistencia lingüística. Ron al [phone].    We comply with applicable federal civil rights laws and [place] laws. We do not discriminate on the basis of race, color, national origin, age, disability sex, sexual orientation or gender identity.            Thank you!     Thank you for choosing [facility]  for your care. Our goal is always to provide you with excellent care. Hearing back from our patients is one way we can continue to improve our services. Please take a few minutes to complete the written survey that you may receive in the mail after your visit with us. Thank you!             Your Updated Medication List - Protect others around you: Learn how to safely use, store and throw away your medicines at www.disposemymeds.org.          This list is accurate as of: 7/17/17 10:54 AM.  Always use your most recent med list.                   Brand Name Dispense Instructions for use Diagnosis    CALCIUM + D PO      Take 600 mg by mouth    Routine general medical examination at a health care facility       FLUoxetine HCl (PMDD) 20 MG Caps      Take 1 capsule by mouth daily.        MULTIVITAMINS PO      1 daily    Routine general medical examination at a health care facility       vitamin C CR 1000 MG Tbcr      1 TABLET DAILY        WELLBUTRIN  MG 24 hr tablet   Generic drug:  buPROPion      Take 150 mg by mouth every morning.

## 2017-07-17 NOTE — PROGRESS NOTES
SUBJECTIVE:                                                    Nicki Guerra is a 72 year old female who presents to clinic today for the following health issues:      Left Buttock Pain    Onset: 1-2 months    Description:   Location: Left buttocks  Character: Sharp, Dull ache and Cramping    Intensity: 8/10 at its worst    Progression of Symptoms: intermittent - daily but not constant.     Accompanying Signs & Symptoms:  Other symptoms: radiation of pain down back of left leg into calf    History:   Previous similar pain: YES- ongoing with intermittent flares      Precipitating factors:   Trauma or overuse: Possible lifting and shoveling over the winter.  Moving brother from home.    Walking sometimes causes symptoms.    Alleviating factors:  Improved by: ice and acetaminophen    Therapies Tried and outcome: Ice, Tylenol  Fall once in winter without injury.      Had plantar fasciitis but resolved with stretching.          Concern - Lip Swelling  Onset: Jan intermittent symptoms, recent flare in past 2 weeks    Description:   Pt c/o of lip swelling that comes and goes. Lips become red, dry, tight, and slightly itchy.    Intensity: mild    Progression of Symptoms:  intermittent    Accompanying Signs & Symptoms:  None    Previous history of similar problem:   Yes, since January    Precipitating factors:   Worsened by: none    Alleviating factors:  Improved by: washing lips    Therapies Tried and outcome: Washing with soap and using bacitracin         Problem list and histories reviewed & adjusted, as indicated.  Additional history: as documented    BP Readings from Last 3 Encounters:   07/17/17 104/58   03/23/17 118/66   07/25/16 100/64    Wt Readings from Last 3 Encounters:   07/17/17 57.9 kg (127 lb 9.6 oz)   03/23/17 58.1 kg (128 lb)   07/25/16 56.7 kg (125 lb)                    Reviewed and updated as needed this visit by clinical staff  Tobacco  Allergies  Meds  Med Hx  Surg Hx  Fam Hx  Soc Hx       Reviewed and updated as needed this visit by Provider  Tobacco  Allergies  Meds  Med Hx  Surg Hx  Fam Hx  Soc Hx        ROS:    Constitutional, HEENT, cardiovascular, pulmonary, gi and gu systems are negative, except as otherwise noted.    OBJECTIVE:     /58 (BP Location: Right arm, Patient Position: Sitting, Cuff Size: Adult Regular)  Pulse 60  Temp 98.2  F (36.8  C) (Oral)  Wt 57.9 kg (127 lb 9.6 oz)  SpO2 98%  BMI 21.23 kg/m2  Body mass index is 21.23 kg/(m^2).  GENERAL: healthy, alert and no distress  NECK: no adenopathy, no asymmetry, masses, or scars and thyroid normal to palpation  RESP: lungs clear to auscultation - no rales, rhonchi or wheezes  CV: regular rate and rhythm, normal S1 S2, no S3 or S4, no murmur, click or rub, no peripheral edema and peripheral pulses strong  ABDOMEN: soft, nontender, no hepatosplenomegaly, no masses and bowel sounds normal  SKIN: skin of the upper and lower lips with thickened cracking appearance.  No open areas, no drainage.    Comprehensive back pain exam:  Tenderness of left lumbar paravertebral muscles and buttocks, Range of motion not limited by pain, Lower extremity strength functional and equal on both sides, Lower extremity reflexes within normal limits bilaterally, Lower extremity sensation normal and equal on both sides and Straight leg positive on  left, indicating possible ipsilateral radiculopathy        ASSESSMENT/PLAN:         1. Cheilitis  See instructions.  Does not appear to be secondarily infected with bacteria at this time.     2. Acute left-sided low back pain with left-sided sciatica  - [facility] PT, HAND, AND CHIROPRACTIC REFERRAL    Patient Instructions   For the rash at lips - use Cutivate or Lubriderm to area twice a day to three times a day   Chapstick for use intermittently.   Consider vasoline for barrier if needed.  If cracking or moist drainage occurs, notify me.    Referral to PHYSICAL THERAPY for core strengthening  recommended.  Continue tylenol as needed. Ice after activity.  Consider gentle heat if pain occurs prior to activity.  Stretching before exercise recommended.          Melonie Lucia MD  [facility]

## 2017-08-01 ENCOUNTER — TELEPHONE (OUTPATIENT)
Dept: FAMILY MEDICINE | Facility: CLINIC | Age: 72
End: 2017-08-01

## 2017-08-01 DIAGNOSIS — K13.0 ANGULAR CHEILITIS: Primary | ICD-10-CM

## 2017-08-01 NOTE — TELEPHONE ENCOUNTER
Reason for Call:  Other call back    Detailed comments: Was seen 07/17 for swollen lips was told to call to update Dr on lips.  Nicki said it went away almost completely and has since returned almost worse than before. Cracks in the corner and lips feel very raw and painful. Please call and advise what to do.     Phone Number Patient can be reached at: Home number on file [phone] (home)    Best Time: Any    Can we leave a detailed message on this number? YES    Call taken on 8/1/2017 at 3:08 PM by Flory Barcenas

## 2017-08-04 ENCOUNTER — THERAPY VISIT (OUTPATIENT)
Dept: PHYSICAL THERAPY | Facility: CLINIC | Age: 72
End: 2017-08-04
Payer: COMMERCIAL

## 2017-08-04 DIAGNOSIS — M54.42 BILATERAL LOW BACK PAIN WITH LEFT-SIDED SCIATICA: Primary | ICD-10-CM

## 2017-08-04 PROCEDURE — 97110 THERAPEUTIC EXERCISES: CPT | Mod: GP | Performed by: PHYSICAL THERAPIST

## 2017-08-04 PROCEDURE — 97161 PT EVAL LOW COMPLEX 20 MIN: CPT | Mod: GP | Performed by: PHYSICAL THERAPIST

## 2017-08-04 PROCEDURE — 97112 NEUROMUSCULAR REEDUCATION: CPT | Mod: GP | Performed by: PHYSICAL THERAPIST

## 2017-08-04 RX ORDER — MUPIROCIN 20 MG/G
OINTMENT TOPICAL 2 TIMES DAILY
Qty: 15 G | Refills: 0 | Status: SHIPPED | OUTPATIENT
Start: 2017-08-04 | End: 2017-08-14

## 2017-08-04 NOTE — PROGRESS NOTES
[facility] Initial Evaluation    Subjective:    Patient is a 72 year old female presenting with rehab back hpi.   Nicki Guerra is a 72 year old female with a lumbar condition.  Condition occurred with:  Other reason.  Condition occurred: for unknown reasons.  This is a chronic condition  I have had back pain since my 40s.  Pain would come and go.  This last winter, on or about 1/2017 I started to have pain on the left low back and goes down into the leg.   Saw the MD 7/17/2017..    Patient reports pain:  Lumbar spine left and lower lumbar spine.  Radiates to:  Gluteals left, thigh left, knee left and lower leg left.  Pain is described as burning and is intermittent and reported as 7/10 (when occurs).  Associated with: cramping occasionally at night. Pain is the same all the time.  Symptoms are exacerbated by walking and sitting and relieved by ice and rest (moving the leg, stretching).  Since onset symptoms are gradually improving (less often).  Special testing: none.  Previous treatment includes chiropractic.  There was moderate improvement following previous treatment.  General health as reported by patient is excellent.  Past medical history: neck.  Medical allergies: no.  Other surgeries include:  Other (caroal tunnel, DNC).  Medication history: flixetine, ibuprofen.  Current occupation is .  Patient is working in normal job without restrictions.  Primary job tasks include:  Prolonged standing and prolonged sitting.    Barriers: condo, .    Red flags:  None as reported by the patient.                        Objective:    Standing Alignment:    Cervical/Thoracic:  Forward head (fair sitting posture)  Shoulder/UE:  Rounded shoulders  Lumbar:  Lordosis decr          General Deviations:  Toe out R and toe out L      Flexibility/Screens:   Positive screens:  LumbarNegative screens: Hip (hip weakness)     Lower Extremity:  Decreased left lower extremity flexibility:Hip Flexors;  Quadriceps and Hamstrings    Decreased right lower extremity flexibility:  Hip Flexors; Quadriceps and Hamstrings               Lumbar/SI Evaluation  ROM:    AROM Lumbar:   Flexion:            Toes  Ext:                    Minimal movement   Side Bend:        Left:  WFL    Right:  WFL painful  Rotation:           Left:  75% of motion    Right:  75% of motion   Side Glide:        Left:     Right:         Strength: hip abduction 4/5 bilateral, poor gluteal recruitment  Lumbar Myotomes:    T12-L3 (Hip Flex):  Left: 5    Right: 5  L2-4 (Quads):  Left:  5    Right:  5  L4 (Ankle DF):  Left:  5    Right:  5  L5 (Great Toe Ext): Left: 5    Right: 5   S1 (Toe Raise):  Right: 5        Neural Tension/Mobility:      Left side:Slump  negative.     Right side:   Slump  negative.   Lumbar Palpation:    Tenderness present at Left:    Quadratus Lumborum; Erector Spinae; Gluteus Medius and Vertebral  Tenderness present at Right: Vertebral  Functional Tests:  Core strength and proprioception lumbar: balance right leg 15 sec, left 12 sec.        Lumbar Provocation:    Left positive with:  Mobility and PROM hip    Spinal Segmental Conclusions:     Level: Hypo noted at L3, L4 and L5                                        Hip Evaluation  Hip PROM:    Flexion: Left: WFL   Right: WFL  Extension: Left: tightnes   Right: tightness                                       General     ROS    Assessment/Plan:      Patient is a 72 year old female with lumbar complaints.    Patient has the following significant findings with corresponding treatment plan.                Diagnosis 1:  Low  Back pain   Pain -  manual therapy, self management, education, directional preference exercise and home program  Decreased ROM/flexibility - manual therapy, therapeutic exercise, therapeutic activity and home program  Decreased joint mobility - manual therapy, therapeutic exercise, therapeutic activity and home program  Decreased strength - therapeutic exercise,  therapeutic activities and home program  Impaired balance - neuro re-education, therapeutic activities and home program  Decreased function - therapeutic activities and home program  Impaired posture - neuro re-education, therapeutic activities and home program  Evaluation ongoing.    Therapy Evaluation Codes:   1) History comprised of:   Personal factors that impact the plan of care:      Past/current experiences and Time since onset of symptoms.    Comorbidity factors that impact the plan of care are:      None.     Medications impacting care: Anti-depressant and Anti-inflammatory.  2) Examination of Body Systems comprised of:   Body structures and functions that impact the plan of care:      Hip, Lumbar spine and Pelvis.   Activity limitations that impact the plan of care are:      Bending, Sitting, Stairs and Walking.  3) Clinical presentation characteristics are:   Stable/Uncomplicated.  4) Decision-Making    Low complexity using standardized patient assessment instrument and/or measureable assessment of functional outcome.  Cumulative Therapy Evaluation is: Low complexity.    Previous and current functional limitations:  (See Goal Flow Sheet for this information)    Short term and Long term goals: (See Goal Flow Sheet for this information)     Communication ability:  Patient appears to be able to clearly communicate and understand verbal and written communication and follow directions correctly.  Treatment Explanation - The following has been discussed with the patient:   RX ordered/plan of care  Possible risks and side effects  This patient would benefit from PT intervention to resume normal activities.   Rehab potential is good.    Frequency:  1 X week, once daily  Duration:  for 8 weeks  Discharge Plan:  Achieve all LTG.  Independent in home treatment program.    Please refer to the daily flowsheet for treatment today, total treatment time and time spent performing 1:1 timed codes.

## 2017-08-04 NOTE — TELEPHONE ENCOUNTER
Call patient re:  Has there been any new exposure?  With the cracking in the corners of mouth - I would recommend use of bactroban ointment twice a day for 10 days.  Script sent to [facility] in [facility].  Follow up in office next week if not improving. LOGANK

## 2017-08-04 NOTE — LETTER
[facility]  [address]  [place] [address]  [phone]    2017    Re: Nicki Guerra   :   1945  MRN:  [id number]   REFERRING PHYSICIAN:   Melonie Lucia    [facility] PH[facility]ICAL [facility]    Date of Initial Evaluation:  2017  Visits:  Rxs Used: 1  Reason for Referral:  Bilateral low back pain with left-sided sciatica    EVALUATION SUMMARY    Subjective:  Patient is a 72 year old female presenting with rehab back hpi.   Nicki Guerra is a 72 year old female with a lumbar condition.  Condition occurred with:  Other reason.  Condition occurred: for unknown reasons.  This is a chronic condition  I have had back pain since my 40s.  Pain would come and go.  This last winter, on or about 2017 I started to have pain on the left low back and goes down into the leg.   Saw the MD 2017.    Patient reports pain:  Lumbar spine left and lower lumbar spine.  Radiates to:  Gluteals left, thigh left, knee left and lower leg left.  Pain is described as burning and is intermittent and reported as 7/10 (when occurs).  Associated with: cramping occasionally at night. Pain is the same all the time.  Symptoms are exacerbated by walking and sitting and relieved by ice and rest (moving the leg, stretching).  Since onset symptoms are gradually improving (less often).  Special testing: none.  Previous treatment includes chiropractic.  There was moderate improvement following previous treatment.  General health as reported by patient is excellent.  Past medical history: neck.  Medical allergies: no.  Other surgeries include:  Other (caroal tunnel, DNC).  Medication history: flixetine, ibuprofen.  Current occupation is .  Patient is working in normal job without restrictions.  Primary job tasks include:  Prolonged standing and prolonged sitting.  Barriers: condo, .  Red flags:   None as reported by the patient.                  Objective:  Standing Alignment:    Cervical/Thoracic:  Forward head (fair sitting posture)  Shoulder/UE:  Rounded shoulders  Lumbar:  Lordosis decr  General Deviations:  Toe out R and toe out L  Flexibility/Screens:   Positive screens:  LumbarNegative screens: Hip (hip weakness)   Lower Extremity:  Decreased left lower extremity flexibility:Hip Flexors; Quadriceps and Hamstrings  Decreased right lower extremity flexibility:  Hip Flexors; Quadriceps and Hamstrings  Re: Nicki Guerra   :   1945       Lumbar/SI Evaluation  ROM:    AROM Lumbar:   Flexion:            Toes  Ext:                    Minimal movement   Side Bend:        Left:  WFL    Right:  WFL painful  Rotation:           Left:  75% of motion    Right:  75% of motion   Side Glide:        Left:     Right:       Strength: hip abduction 4/5 bilateral, poor gluteal recruitment  Lumbar Myotomes:    T12-L3 (Hip Flex):  Left: 5    Right: 5  L2-4 (Quads):  Left:  5    Right:  5  L4 (Ankle DF):  Left:  5    Right:  5  L5 (Great Toe Ext): Left: 5    Right: 5   S1 (Toe Raise):  Right: 5  Neural Tension/Mobility:    Left side:Slump  negative.   Right side:   Slump  negative.   Lumbar Palpation:    Tenderness present at Left:    Quadratus Lumborum; Erector Spinae; Gluteus Medius and Vertebral  Tenderness present at Right: Vertebral  Functional Tests:  Core strength and proprioception lumbar: balance right leg 15 sec, left 12 sec.  Lumbar Provocation:    Left positive with:  Mobility and PROM hip  Spinal Segmental Conclusions:   Level: Hypo noted at L3, L4 and L5       Hip Evaluation  Hip PROM:    Flexion: Left: WFL   Right: WFL  Extension: Left: tightnes   Right: tightness    Assessment/Plan:    Patient is a 72 year old female with lumbar complaints.    Patient has the following significant findings with corresponding treatment plan.                Diagnosis 1:  Low  Back pain   Pain -  manual therapy, self  management, education, directional preference exercise and home program  Decreased ROM/flexibility - manual therapy, therapeutic exercise, therapeutic activity and home program  Decreased joint mobility - manual therapy, therapeutic exercise, therapeutic activity and home program  Decreased strength - therapeutic exercise, therapeutic activities and home program  Impaired balance - neuro re-education, therapeutic activities and home program  Decreased function - therapeutic activities and home program  Impaired posture - neuro re-education, therapeutic activities and home program  Evaluation ongoing.        Re: Nicki Guerra   :   1945    Therapy Evaluation Codes:   1) History comprised of:   Personal factors that impact the plan of care:      Past/current experiences and Time since onset of symptoms.    Comorbidity factors that impact the plan of care are:      None.     Medications impacting care: Anti-depressant and Anti-inflammatory.  2) Examination of Body Systems comprised of:   Body structures and functions that impact the plan of care:      Hip, Lumbar spine and Pelvis.   Activity limitations that impact the plan of care are:      Bending, Sitting, Stairs and Walking.  3) Clinical presentation characteristics are:   Stable/Uncomplicated.  4) Decision-Making    Low complexity using standardized patient assessment instrument and/or measureable assessment of functional outcome.  Cumulative Therapy Evaluation is: Low complexity.    Previous and current functional limitations:  (See Goal Flow Sheet for this information)    Short term and Long term goals: (See Goal Flow Sheet for this information)     Communication ability:  Patient appears to be able to clearly communicate and understand verbal and written communication and follow directions correctly.  Treatment Explanation - The following has been discussed with the patient:   RX ordered/plan of care  Possible risks and side effects  This patient  would benefit from PT intervention to resume normal activities.   Rehab potential is good.    Frequency:  1 X week, once daily  Duration:  for 8 weeks  Discharge Plan:  Achieve all LTG.  Independent in home treatment program.    Thank you for your referral.    INQUIRIES  Therapist: Flor Ferro, PT   [facility] [place] PH[facility]ICAL [facility]ERAP[facility]  [address] [address]  Phone: [phone]  Fax: [phone]

## 2017-08-04 NOTE — MR AVS SNAPSHOT
After Visit Summary   8/4/2017    Nicki Guerra    MRN: [id number]           Patient Information     Date Of Birth          1945        Visit Information        Provider Department      8/4/2017 8:20 AM Flor Ferro PT [facility] Physical Therapy        Today's Diagnoses     Bilateral low back pain with left-sided sciatica    -  1       Follow-ups after your visit        Your next 10 appointments already scheduled     Aug 21, 2017  9:00 AM CDT   ODALYS Spine with Flor Ferro PT   [facility]    [address]  [place] [id number] [phone]              Who to contact     If you have questions or need follow up information about today's clinic visit or your schedule please contact [facility]LET[facility] PHYSICAL [facility] directly at [phone].  Normal or non-critical lab and imaging results will be communicated to you by Thinglinkhart, letter or phone within 4 business days after the clinic has received the results. If you do not hear from us within 7 days, please contact the clinic through Testint or phone. If you have a critical or abnormal lab result, we will notify you by phone as soon as possible.  Submit refill requests through Beegit or call your pharmacy and they will forward the refill request to us. Please allow 3 business days for your refill to be completed.          Additional Information About Your Visit        MyChart Information     Beegit gives you secure access to your electronic health record. If you see a primary care provider, you can also send messages to your care team and make appointments. If you have questions, please call your primary care clinic.  If you do not have a primary care provider, please call [phone] and they will assist you.        Care EveryWhere ID     This is your Care  EveryWhere ID. This could be used by other organizations to access your [facility] medical records  [id number]         Blood Pressure from Last 3 Encounters:   07/17/17 104/58   03/23/17 118/66   07/25/16 100/64    Weight from Last 3 Encounters:   07/17/17 57.9 kg (127 lb 9.6 oz)   03/23/17 58.1 kg (128 lb)   07/25/16 56.7 kg (125 lb)              We Performed the Following     ODALYS Inital Eval Report     Neuromuscular Re-Education     PT Alineal, Low Complexity (38889)     Therapeutic Exercises        Primary Care Provider Office Phone # Fax #    Melonie Lucia [phone]       [address]  [facility] [address]        Equal Access to Services     CARMITA COATES : Hadii lionel andrews hadasho Soomaali, waaxda luqadaha, qaybta kaalmada adeegyada, haroon pate haynkecih morneo . So [facility] [phone].    ATENCIÓN: Si habla español, tiene a ortega disposición servicios gratuitos de asistencia lingüística. Llame al [phone].    We comply with applicable federal civil rights laws and [place] laws. We do not discriminate on the basis of race, color, national origin, age, disability sex, sexual orientation or gender identity.            Thank you!     Thank you for choosing [facility] PHYSICAL THERAPY  for your care. Our goal is always to provide you with excellent care. Hearing back from our patients is one way we can continue to improve our services. Please take a few minutes to complete the written survey that you may receive in the mail after your visit with us. Thank you!             Your Updated Medication List - Protect others around you: Learn how to safely use, store and throw away your medicines at www.disposemymeds.org.          This list is accurate as of: 8/4/17 11:59 PM.  Always use your most recent med list.                   Brand Name Dispense Instructions for use Diagnosis    CALCIUM + D PO      Take 600 mg by mouth     Routine general medical examination at a health care facility       FLUoxetine HCl (PMDD) 20 MG Caps      Take 1 capsule by mouth daily.        MULTIVITAMINS PO      1 daily    Routine general medical examination at a health care facility       mupirocin 2 % ointment    BACTROBAN    15 g    Apply topically 2 times daily for 10 days    Angular cheilitis       vitamin C CR 1000 MG Tbcr      1 TABLET DAILY        WELLBUTRIN  MG 24 hr tablet   Generic drug:  buPROPion      Take 150 mg by mouth every morning.

## 2017-08-04 NOTE — TELEPHONE ENCOUNTER
This writer attempted to contact Nicki on 08/04/17      Reason for call cracks in corner of mouth and left detailed message.      When patient calls back, please Relay message any new exposure? Bactroban twice daily x 10 days. Sent to [facility] in [facility] (read verbatim), document that pt called and close encounter.          Tara Georges RN

## 2017-08-06 PROBLEM — M54.42 BILATERAL LOW BACK PAIN WITH LEFT-SIDED SCIATICA: Status: ACTIVE | Noted: 2017-08-06

## 2017-08-07 NOTE — TELEPHONE ENCOUNTER
Call to patient and denies any new exposure that is she is aware of. She is throwing away all lip gloss and lip stick. Stated continuing to use bactroban but first time she put it on it felt so much better.  Tara Georges RN.

## 2017-08-22 ENCOUNTER — ALLIED HEALTH/NURSE VISIT (OUTPATIENT)
Dept: NURSING | Facility: CLINIC | Age: 72
End: 2017-08-22
Payer: COMMERCIAL

## 2017-08-22 DIAGNOSIS — Z11.1 SCREENING EXAMINATION FOR PULMONARY TUBERCULOSIS: Primary | ICD-10-CM

## 2017-08-22 PROCEDURE — 86580 TB INTRADERMAL TEST: CPT

## 2017-08-22 PROCEDURE — 99207 ZZC NO CHARGE NURSE ONLY: CPT

## 2017-08-22 PROCEDURE — 90471 IMMUNIZATION ADMIN: CPT

## 2017-08-22 NOTE — MR AVS SNAPSHOT
After Visit Summary   8/22/2017    Nicki Guerra    MRN: [id number]           Patient Information     Date Of Birth          1945        Visit Information        Provider Department      8/22/2017 11:00 AM BA [facility]CILL[facility]        Today's Diagnoses     Screening examination for pulmonary tuberculosis    -  1       Follow-ups after your visit        Your next 10 appointments already scheduled     Aug 24, 2017 11:30 AM CDT   Nurse Only with DONATO RN   [facility]    [address] [address] [phone]            Aug 28, 2017 11:00 AM CDT   ODALYS Spine with Flor Ferro PT   [facility]    [address] [address] [phone]              Who to contact     If you have questions or need follow up information about today's clinic visit or your schedule please contact [facility] directly at [phone].  Normal or non-critical lab and imaging results will be communicated to you by [place] (RTP)hart, letter or phone within 4 business days after the clinic has received the results. If you do not hear from us within 7 days, please contact the clinic through [place] (RTP)hart or phone. If you have a critical or abnormal lab result, we will notify you by phone as soon as possible.  Submit refill requests through [employer] or call your pharmacy and they will forward the refill request to us. Please allow 3 business days for your refill to be completed.          Additional Information About Your Visit        MyChart Information     [employer] gives you secure access to your electronic health record. If you see a primary care provider, you can also send messages to your care team and make appointments. If you have questions, please call your primary care clinic.  If you do not have a primary care  provider, please call [phone] and they will assist you.        Care EveryWhere ID     This is your Care EveryWhere ID. This could be used by other organizations to access your [place] medical records  [id number]         Blood Pressure from Last 3 Encounters:   07/17/17 104/58   03/23/17 118/66   07/25/16 100/64    Weight from Last 3 Encounters:   07/17/17 57.9 kg (127 lb 9.6 oz)   03/23/17 58.1 kg (128 lb)   07/25/16 56.7 kg (125 lb)              We Performed the Following     ADMIN 1st VACCINE     TB INTRADERMAL TEST        Primary Care Provider Office Phone # Fax #    Melonie Lucia [phone] [address]  [place] [address]        Equal Access to Services     [facility] : Hadii aad ku hadasho Soomaali, waaxda luqadaha, qaybta kaalmada adeegyada, waxwolf pate haynkechi moreno . So [facility] [phone].    ATENCIÓN: Si habla español, tiene a ortega disposición servicios gratuitos de asistencia lingüística. [facility] [phone].    We comply with applicable federal civil rights laws and [place] laws. We do not discriminate on the basis of race, color, national origin, age, disability sex, sexual orientation or gender identity.            Thank you!     Thank you for choosing [facility]  for your care. Our goal is always to provide you with excellent care. Hearing back from our patients is one way we can continue to improve our services. Please take a few minutes to complete the written survey that you may receive in the mail after your visit with us. Thank you!             Your Updated Medication List - Protect others around you: Learn how to safely use, store and throw away your medicines at www.disposemymeds.org.          This list is accurate as of: 8/22/17 11:33 AM.  Always use your most recent med list.                   Brand Name Dispense Instructions for use Diagnosis    CALCIUM + D PO      Take 600 mg by mouth    Routine general medical  examination at a health care facility       FLUoxetine HCl (PMDD) 20 MG Caps      Take 1 capsule by mouth daily.        MULTIVITAMINS PO      1 daily    Routine general medical examination at a health care facility       vitamin C CR 1000 MG Tbcr      1 TABLET DAILY        WELLBUTRIN  MG 24 hr tablet   Generic drug:  buPROPion      Take 150 mg by mouth every morning.

## 2017-08-24 ENCOUNTER — ALLIED HEALTH/NURSE VISIT (OUTPATIENT)
Dept: NURSING | Facility: CLINIC | Age: 72
End: 2017-08-24

## 2017-08-24 DIAGNOSIS — Z11.1 SCREENING EXAMINATION FOR PULMONARY TUBERCULOSIS: Primary | ICD-10-CM

## 2017-08-24 LAB
PPDINDURATION: 0 MM (ref 0–5)
PPDREDNESS: 0 MM

## 2017-08-24 NOTE — MR AVS SNAPSHOT
After Visit Summary   8/24/2017    Nicki Guerra    MRN: [id number]           Patient Information     Date Of Birth          1945        Visit Information        Provider Department      8/24/2017 11:30 AM BK RN [facility]        Today's Diagnoses     Screening examination for pulmonary tuberculosis    -  1       Follow-ups after your visit        Your next 10 appointments already scheduled     Aug 28, 2017 11:00 AM CDT   OD[facility] Spine with Flor Ferro PT   [facility]    [address] [id number] [phone]              Who to contact     If you have questions or need follow up information about today's clinic visit or your schedule please contact [facility] directly at [phone].  Normal or non-critical lab and imaging results will be communicated to you by MyChart, letter or phone within 4 business days after the clinic has received the results. If you do not hear from us within 7 days, please contact the clinic through MyChart or phone. If you have a critical or abnormal lab result, we will notify you by phone as soon as possible.  Submit refill requests through [employer] or call your pharmacy and they will forward the refill request to us. Please allow 3 business days for your refill to be completed.          Additional Information About Your Visit        MyChart Information     [employer] gives you secure access to your electronic health record. If you see a primary care provider, you can also send messages to your care team and make appointments. If you have questions, please call your primary care clinic.  If you do not have a primary care provider, please call [phone] and they will assist you.        Care EveryWhere ID     This is your Care EveryWhere ID. This could be used by other organizations to access your [facility]  records  [id number]         Blood Pressure from Last 3 Encounters:   07/17/17 104/58   03/23/17 118/66   07/25/16 100/64    Weight from Last 3 Encounters:   07/17/17 127 lb 9.6 oz (57.9 kg)   03/23/17 128 lb (58.1 kg)   07/25/16 125 lb (56.7 kg)              Today, you had the following     No orders found for display       Primary Care Provider Office Phone # Fax #    Melonie Lucia [phone] [address]  [facility] [address]        Equal Access to Services     [facility]: Hadii aad ku hadasho Soarmando, waaxda luqadaha, qaybta kaalmada adebhartiyada, haroon moreno . So [facility] [phone].    ATENCIÓN: Si habla español, tiene a ortega disposición servicios gratuitos de asistencia lingüística. Llame al [phone].    We comply with applicable federal civil rights laws and [place] laws. We do not discriminate on the basis of race, color, national origin, age, disability sex, sexual orientation or gender identity.            Thank you!     Thank you for choosing [facility]  for your care. Our goal is always to provide you with excellent care. Hearing back from our patients is one way we can continue to improve our services. Please take a few minutes to complete the written survey that you may receive in the mail after your visit with us. Thank you!             Your Updated Medication List - Protect others around you: Learn how to safely use, store and throw away your medicines at www.disposemymeds.org.          This list is accurate as of: 8/24/17 11:39 AM.  Always use your most recent med list.                   Brand Name Dispense Instructions for use Diagnosis    CALCIUM + D PO      Take 600 mg by mouth    Routine general medical examination at a health care facility       FLUoxetine HCl (PMDD) 20 MG Caps      Take 1 capsule by mouth daily.        MULTIVITAMINS PO      1 daily    Routine general medical examination at a health care  facility       vitamin C CR 1000 MG Tbcr      1 TABLET DAILY        WELLBUTRIN  MG 24 hr tablet   Generic drug:  buPROPion      Take 150 mg by mouth every morning.

## 2017-08-24 NOTE — PROGRESS NOTES
[place] result:Negative  Lab Results   Component Value Date    PPDREDNESS 0 08/24/2017    PPDINDURATIO 0 08/24/2017     Print out of results given to patient.   Tiffani Naik RN

## 2017-09-06 ENCOUNTER — THERAPY VISIT (OUTPATIENT)
Dept: PHYSICAL THERAPY | Facility: CLINIC | Age: 72
End: 2017-09-06
Payer: COMMERCIAL

## 2017-09-06 DIAGNOSIS — M54.42 BILATERAL LOW BACK PAIN WITH LEFT-SIDED SCIATICA: ICD-10-CM

## 2017-09-06 PROCEDURE — 97112 NEUROMUSCULAR REEDUCATION: CPT | Mod: GP | Performed by: PHYSICAL THERAPIST

## 2017-09-06 PROCEDURE — 97110 THERAPEUTIC EXERCISES: CPT | Mod: GP | Performed by: PHYSICAL THERAPIST

## 2017-09-06 PROCEDURE — 97530 THERAPEUTIC ACTIVITIES: CPT | Mod: GP | Performed by: PHYSICAL THERAPIST

## 2017-09-20 ENCOUNTER — THERAPY VISIT (OUTPATIENT)
Dept: PHYSICAL THERAPY | Facility: CLINIC | Age: 72
End: 2017-09-20
Payer: COMMERCIAL

## 2017-09-20 DIAGNOSIS — M54.42 BILATERAL LOW BACK PAIN WITH LEFT-SIDED SCIATICA: ICD-10-CM

## 2017-09-20 PROCEDURE — 97112 NEUROMUSCULAR REEDUCATION: CPT | Mod: GP | Performed by: PHYSICAL THERAPIST

## 2017-09-20 PROCEDURE — 97110 THERAPEUTIC EXERCISES: CPT | Mod: GP | Performed by: PHYSICAL THERAPIST

## 2017-09-20 NOTE — LETTER
[facility] PHYSICAL THERAPY  [address]  [place] [address]  [phone]    2017    Re: Nicki Guerra   :   1945  MRN:  [id number]   REFERRING PHYSICIAN:   Melonie Lucia    [facility] PHYSICAL [facility]    Date of Initial Evaluation:  2017  Visits:  Rxs Used: 3  Reason for Referral:  Bilateral low back pain with left-sided sciatica    PROGRESS  REPORT  Progress reporting period is from 2017 to 2017.       SUBJECTIVE  Subjective changes noted by patient:  The pain is coming less often, less intense.  the extension feels good.  I am about 70-80% of my normal. balance is getting better     Current Pain level: 5/10.     Previous pain level was  0/10  .   Changes in function:  Yes, slow improvement  Adverse reaction to treatment or activity: activity - too much walking increase in pain     OBJECTIVE  Changes noted in objective findings:  Yes, TROM flexion floor, repetitive flexion today decrease in pain, extension WFL. SB WFL right tightness on the right side , rotation moderate each direction.  Left hip abduction 4-/5, right 4/5.  TA 2-/5.  Increase awareness of posture and back neutral spine. Tightness in Lower lumbar vertebra.  Discussed with patient progression of exercises and home exercise program.     ASSESSMENT/PLAN  Updated problem list and treatment plan: Diagnosis 1:  Low back pain   Pain -  manual therapy, self management, education, directional preference exercise and home program  Decreased ROM/flexibility - manual therapy, therapeutic exercise, therapeutic activity and home program  Decreased joint mobility - manual therapy, therapeutic exercise, therapeutic activity and home program  Decreased strength - therapeutic exercise, therapeutic activities and home program  Impaired muscle performance - neuro re-education and home program  Decreased function -  therapeutic activities and home program  STG/LTGs have been met or progress has been made towards goals:  Yes (See Goal flow sheet completed today.)  Assessment of Progress: The patient's condition is improving.  The patient's condition has potential to improve.  Self Management Plans:  Patient has been instructed in a home treatment program.  Patient  has been instructed in self management of symptoms.    Re: Nicki Guerra   :   1945    Recommendations:  Patient would like to try exercises on her own.  Patient to call if any questions.  Patient does have appointments left on original order and plans to return if unable to progress with exercises or if pain returns.  Thank you for the opportunity of working with this motivated individual.    Thank you for your referral.    INQUIRIES  Therapist: Flor Ferro PT  [facility]  [address] [address]  Phone: [phone]  Fax: [phone]

## 2017-09-20 NOTE — MR AVS SNAPSHOT
After Visit Summary   9/20/2017    Nicki Guerra    MRN: [id number]           Patient Information     Date Of Birth          1945        Visit Information        Provider Department      9/20/2017 8:20 AM Flor Ferro PT [facility]        Today's Diagnoses     Bilateral low back pain with left-sided sciatica           Follow-ups after your visit        Who to contact     If you have questions or need follow up information about today's clinic visit or your schedule please contact [facility] directly at [phone].  Normal or non-critical lab and imaging results will be communicated to you by [employer] Inchart, letter or phone within 4 business days after the clinic has received the results. If you do not hear from us within 7 days, please contact the clinic through [employer] Inchart or phone. If you have a critical or abnormal lab result, we will notify you by phone as soon as possible.  Submit refill requests through [employer] or call your pharmacy and they will forward the refill request to us. Please allow 3 business days for your refill to be completed.          Additional Information About Your Visit        MyChart Information     [employer] gives you secure access to your electronic health record. If you see a primary care provider, you can also send messages to your care team and make appointments. If you have questions, please call your primary care clinic.  If you do not have a primary care provider, please call [phone] and they will assist you.        Care EveryWhere ID     This is your Care EveryWhere ID. This could be used by other organizations to access your [facility] medical records  [id number]         Blood Pressure from Last 3 Encounters:   07/17/17 104/58   03/23/17 118/66   07/25/16 100/64    Weight from Last 3 Encounters:   07/17/17 57.9 kg (127 lb 9.6 oz)   03/23/17 58.1 kg  (128 lb)   07/25/16 56.7 kg (125 lb)              We Performed the Following     ODALYS Progress Notes Report     Neuromuscular Re-Education     Therapeutic Exercises        Primary Care Provider Office Phone # Fax #    Melonie Lucia [phone] [address]  [facility] [address]        Equal Access to Services     [facility]: Hadii aad ku hadasho Soomaali, waaxda luqadaha, qaybta kaalmada adeegyada, waxay idiin hayaan adeeg kharash la'aan . So [facility] [phone].    ATENCIÓN: Si habla español, tiene a ortega disposición servicios gratuitos de asistencia lingüística. Llame al [phone].    We comply with applicable federal civil rights laws and [place] laws. We do not discriminate on the basis of race, color, national origin, age, disability sex, sexual orientation or gender identity.            Thank you!     Thank you for choosing [facility]  for your care. Our goal is always to provide you with excellent care. Hearing back from our patients is one way we can continue to improve our services. Please take a few minutes to complete the written survey that you may receive in the mail after your visit with us. Thank you!             Your Updated Medication List - Protect others around you: Learn how to safely use, store and throw away your medicines at www.disposemymeds.org.          This list is accurate as of: 9/20/17  1:58 PM.  Always use your most recent med list.                   Brand Name Dispense Instructions for use Diagnosis    CALCIUM + D PO      Take 600 mg by mouth    Routine general medical examination at a health care facility       FLUoxetine HCl (PMDD) 20 MG Caps      Take 1 capsule by mouth daily.        MULTIVITAMINS PO      1 daily    Routine general medical examination at a health care facility       vitamin C CR 1000 MG Tbcr      1 TABLET DAILY        WELLBUTRIN  MG 24 hr tablet   Generic drug:   buPROPion      Take 150 mg by mouth every morning.

## 2017-09-20 NOTE — PROGRESS NOTES
Subjective:    HPI                    Objective:    System    Physical Exam    General     ROS    Assessment/Plan:      PROGRESS  REPORT    Progress reporting period is from 8/4/2017 to 9/20/2017.       SUBJECTIVE  Subjective changes noted by patient:  The pain is coming less often, less intense.  the extension feels good.  I am about 70-80% of my normal. balance is getting better      Current Pain level: 5/10.     Previous pain level was  0/10  .   Changes in function:  Yes, slow improvement  Adverse reaction to treatment or activity: activity - too much walking increase in pain     OBJECTIVE  Changes noted in objective findings:  Yes, TROM flexion floor, repetitive flexion today decrease in pain, extension WFL. SB WFL right tightness on the right side , rotation moderate each direction.  Left hip abduction 4-/5, right 4/5.  TA 2-/5.  Increase awareness of posture and back neutral spine. Tightness in Lower lumbar vertebra.  Discussed with patient progression of exercises and home exercise program.       ASSESSMENT/PLAN  Updated problem list and treatment plan: Diagnosis 1:  Low back pain   Pain -  manual therapy, self management, education, directional preference exercise and home program  Decreased ROM/flexibility - manual therapy, therapeutic exercise, therapeutic activity and home program  Decreased joint mobility - manual therapy, therapeutic exercise, therapeutic activity and home program  Decreased strength - therapeutic exercise, therapeutic activities and home program  Impaired muscle performance - neuro re-education and home program  Decreased function - therapeutic activities and home program  STG/LTGs have been met or progress has been made towards goals:  Yes (See Goal flow sheet completed today.)  Assessment of Progress: The patient's condition is improving.  The patient's condition has potential to improve.  Self Management Plans:  Patient has been instructed in a home treatment program.  Patient  has  been instructed in self management of symptoms.      Recommendations:  Patient would like to try exercises on her own.  Patient to call if any questions.  Patient does have appointments left on original order and plans to return if unable to progress with exercises or if pain returns.  Thank you for the opportunity of working with this motivated individual.    Please refer to the daily flowsheet for treatment today, total treatment time and time spent performing 1:1 timed codes.

## 2017-10-03 ENCOUNTER — TELEPHONE (OUTPATIENT)
Dept: FAMILY MEDICINE | Facility: CLINIC | Age: 72
End: 2017-10-03

## 2017-10-03 ENCOUNTER — MYC MEDICAL ADVICE (OUTPATIENT)
Dept: FAMILY MEDICINE | Facility: CLINIC | Age: 72
End: 2017-10-03

## 2017-10-03 ENCOUNTER — E-VISIT (OUTPATIENT)
Dept: FAMILY MEDICINE | Facility: CLINIC | Age: 72
End: 2017-10-03
Payer: COMMERCIAL

## 2017-10-03 DIAGNOSIS — N30.00 ACUTE CYSTITIS WITHOUT HEMATURIA: Primary | ICD-10-CM

## 2017-10-03 PROCEDURE — 99444 ZZC PHYSICIAN ONLINE EVALUATION & MANAGEMENT SERVICE: CPT | Performed by: FAMILY MEDICINE

## 2017-10-03 RX ORDER — CIPROFLOXACIN 250 MG/1
250 TABLET, FILM COATED ORAL 2 TIMES DAILY
Qty: 6 TABLET | Refills: 0 | Status: SHIPPED | OUTPATIENT
Start: 2017-10-03 | End: 2018-08-13

## 2017-10-03 NOTE — TELEPHONE ENCOUNTER
Patient called with UTI symptoms for 1 1/2 days. Burning, frequency,, flank pain (tender like a sore muscle over flanks), denies blood or fever. Patient is out of town and will not be back until the weekend. Advised to request an evisit through [employer] when we get off phone. Patient stated that she will . Pharmacy is [facility] in [place], [place] and phone is: [phone].  Routing to provider to review and advise.  Tara Georges RN.

## 2017-10-03 NOTE — TELEPHONE ENCOUNTER
Reason for Call:  Patient is out-of-town and thinks she has an UTI.  Please send Rx to [facility], [place], [place].  When patient is called, she will have phone number of Pharmacy.      Detailed comments: Please call to advise.    Phone Number Patient can be reached at: Home number on file [phone] (home)    Best Time: anytime    Can we leave a detailed message on this number? YES     Thank you,    Call taken on 10/3/2017 at 10:34 AM by Samia Hightower

## 2017-10-04 ENCOUNTER — MYC MEDICAL ADVICE (OUTPATIENT)
Dept: FAMILY MEDICINE | Facility: CLINIC | Age: 72
End: 2017-10-04

## 2017-11-15 PROBLEM — M54.42 BILATERAL LOW BACK PAIN WITH LEFT-SIDED SCIATICA: Status: RESOLVED | Noted: 2017-08-06 | Resolved: 2017-11-15

## 2018-05-15 ENCOUNTER — RADIANT APPOINTMENT (OUTPATIENT)
Dept: MAMMOGRAPHY | Facility: CLINIC | Age: 73
End: 2018-05-15
Attending: FAMILY MEDICINE
Payer: COMMERCIAL

## 2018-05-15 DIAGNOSIS — Z12.31 VISIT FOR SCREENING MAMMOGRAM: ICD-10-CM

## 2018-05-15 PROCEDURE — 77067 SCR MAMMO BI INCL CAD: CPT

## 2018-05-15 PROCEDURE — 77063 BREAST TOMOSYNTHESIS BI: CPT

## 2018-08-13 ENCOUNTER — OFFICE VISIT (OUTPATIENT)
Dept: FAMILY MEDICINE | Facility: CLINIC | Age: 73
End: 2018-08-13
Payer: COMMERCIAL

## 2018-08-13 ENCOUNTER — RADIANT APPOINTMENT (OUTPATIENT)
Dept: GENERAL RADIOLOGY | Facility: CLINIC | Age: 73
End: 2018-08-13
Attending: FAMILY MEDICINE
Payer: COMMERCIAL

## 2018-08-13 VITALS
HEIGHT: 64 IN | TEMPERATURE: 98.1 F | DIASTOLIC BLOOD PRESSURE: 72 MMHG | RESPIRATION RATE: 17 BRPM | SYSTOLIC BLOOD PRESSURE: 128 MMHG | WEIGHT: 132 LBS | HEART RATE: 64 BPM | OXYGEN SATURATION: 99 % | BODY MASS INDEX: 22.53 KG/M2

## 2018-08-13 DIAGNOSIS — M25.512 LEFT SHOULDER PAIN, UNSPECIFIED CHRONICITY: ICD-10-CM

## 2018-08-13 DIAGNOSIS — M25.512 LEFT SHOULDER PAIN, UNSPECIFIED CHRONICITY: Primary | ICD-10-CM

## 2018-08-13 PROCEDURE — 73030 X-RAY EXAM OF SHOULDER: CPT | Mod: LT

## 2018-08-13 PROCEDURE — 99213 OFFICE O/P EST LOW 20 MIN: CPT | Performed by: FAMILY MEDICINE

## 2018-08-13 RX ORDER — BUPROPION HYDROCHLORIDE 200 MG/1
200 TABLET, EXTENDED RELEASE ORAL DAILY
COMMUNITY
End: 2018-11-15

## 2018-08-13 ASSESSMENT — ANXIETY QUESTIONNAIRES
6. BECOMING EASILY ANNOYED OR IRRITABLE: SEVERAL DAYS
2. NOT BEING ABLE TO STOP OR CONTROL WORRYING: SEVERAL DAYS
7. FEELING AFRAID AS IF SOMETHING AWFUL MIGHT HAPPEN: NOT AT ALL
5. BEING SO RESTLESS THAT IT IS HARD TO SIT STILL: NOT AT ALL
3. WORRYING TOO MUCH ABOUT DIFFERENT THINGS: SEVERAL DAYS
GAD7 TOTAL SCORE: 4
1. FEELING NERVOUS, ANXIOUS, OR ON EDGE: SEVERAL DAYS
IF YOU CHECKED OFF ANY PROBLEMS ON THIS QUESTIONNAIRE, HOW DIFFICULT HAVE THESE PROBLEMS MADE IT FOR YOU TO DO YOUR WORK, TAKE CARE OF THINGS AT HOME, OR GET ALONG WITH OTHER PEOPLE: SOMEWHAT DIFFICULT

## 2018-08-13 ASSESSMENT — PAIN SCALES - GENERAL: PAINLEVEL: SEVERE PAIN (7)

## 2018-08-13 ASSESSMENT — PATIENT HEALTH QUESTIONNAIRE - PHQ9: 5. POOR APPETITE OR OVEREATING: NOT AT ALL

## 2018-08-13 NOTE — PATIENT INSTRUCTIONS
If your shoulder is not improving with physical therapy, you will need an MRI and follow up with orthopedics. I put the referral in and the physical therapist scheduling office will call you.              Ice your shoulder before bed for pain relief. You can continue to take Aleve for pain management, just make sure to take it with food.                 Rotator Cuff Injury   What is a rotator cuff injury?   A rotator cuff injury is a strain or tear in the group of tendons and muscles that hold your shoulder joint together and help move your shoulder.   How does it occur?   A rotator cuff injury may result from:     using your arm to break a fall     falling onto your arm     lifting a heavy object     use of your shoulder in sports with a repetitive overhead movement, such as swimming, baseball (mainly pitchers), football, and tennis, which gradually strains the tendon     manual labor such as painting, plastering, raking leaves, or housework   What are the symptoms?   The symptoms of a torn rotator cuff are:     arm and shoulder pain     shoulder weakness     shoulder tenderness     loss of shoulder movement, especially overhead   How is it diagnosed?   Your healthcare provider will examine you and check your shoulder for pain, tenderness, and loss of motion as you move your arm in all directions. Your provider will ask if your shoulder pain began suddenly or gradually. You may have an X-ray to make sure there are not any fractures or bone spurs.   Based on these results, you may have other tests or procedures right away or later, such as:     magnetic resonance imaging (MRI), which creates images of your shoulder and surrounding structures with sound waves     an arthrogram, which is an X-ray or MRI that is taken after a special dye has been injected into your shoulder joint to outline its soft structures     arthroscopy, a surgical procedure in which a small instrument is inserted into your shoulder joint so  your provider can look directly at your rotator cuff   What is the treatment?   A tendon in your shoulder can be inflamed, partially torn, or completely torn. What is done about it depends on how torn it is and how much it hurts.   If your tear is a minor one, it can be left to heal by itself if it does not interfere with your everyday activities. Your treatment plan should include:     proper sitting posture, in which your head and shoulders are balanced     rest for your shoulder, which means avoiding strenuous activity or any overhead motion that causes pain     ice packs at least once a day, and preferably 2 or 3 times a day     doing the exercises your healthcare provider gives you     anti-inflammatory drugs. Adults aged 65 years and older should not take non-steroidal anti-inflammatory medicine for more than 7 days without their healthcare provider's approval.     physical therapy to strengthen your shoulder as it heals   If you have a bad tear, you may need to have it repaired by arthroscopy. Arthroscopy can be used to perform surgery on a joint as well as to see inside the joint. The rough edges of a torn tendon can be trimmed and left to heal. Larger tears can be stitched back together. After surgery, your treatment plan will include physical therapy to strengthen your shoulder as it heals.   How long will the effects last?   Full recovery depends on what is torn and how it is treated.   When can I return to my normal activities?   Everyone recovers from an injury at a different rate. Return to your activities will be determined by how soon your shoulder recovers, not by how many days or weeks it has been since your injury has occurred. In general, the longer you have symptoms before you start treatment, the longer it will take to get better. The goal of rehabilitation is to return you to your normal activities as soon as is safely possible. If you return too soon you may worsen your injury.   You may  safely return to your normal activities when:     Your injured shoulder has full range of motion without pain.     Your injured shoulder has regained normal strength compared to the uninjured shoulder.   What can be done to help prevent this from recurring?   The best way to prevent a recurrence is to strengthen your shoulder muscles and keep them in peak condition with shoulder exercises.           Rotator Cuff Strain Rehabilitation Exercises                  You may do all of these exercises right away.   Isometric shoulder external rotation:  a doorway with your elbow bent 90 degrees and the back of the wrist on your injured side pressed against the door frame. Try to press your hand outward into the door frame. Hold for 5 seconds. Do 3 sets of 10.   Isometric shoulder internal rotation:  a doorway with your elbow bent 90 degrees and the front of the wrist on your injured side pressed against the door frame. Try to press your palm into the door frame. Hold for 5 seconds. Do 3 sets of 10.   Wand exercise: Flexion: Stand upright and hold a stick in both hands, palms down. Stretch your arms by lifting them over your head, keeping your arms straight. Hold for 5 seconds and return to the starting position. Repeat 10 times.   Wand exercise: Extension: Stand upright and hold a stick in both hands behind your back. Move the stick away from your back. Hold the end position for 5 seconds. Relax and return to the starting position. Repeat 10 times.   Wand exercise: External rotation: Lie on your back and hold a stick in both hands, palms up. Your upper arms should be resting on the floor with your elbows at your sides and bent 90 degrees. Use your uninjured arm to push your injured arm out away from your body. Keep the elbow of your injured arm at your side while it is being pushed. Hold the stretch for 5 seconds. Repeat 10 times.   Wand exercise: Shoulder abduction and adduction: Stand and hold a stick with  both hands, palms facing away from your body. Rest the stick against the front of your thighs. Use your uninjured arm to push your injured arm out to the side and up as high as possible. Keep your arms straight. Hold for 5 seconds. Repeat 10 times.   Resisted shoulder external rotation: Stand sideways next to a door with your injured arm farther from the door. Tie a knot in the end of the tubing and shut the knot in the door at waist level. Hold the other end of the tubing with the hand of your injured arm. Rest the hand of your injured arm across your stomach. Keeping your elbow in at your side, rotate your arm outward and away from your waist. Make sure you keep your elbow bent 90 degrees and your forearm parallel to the floor. Repeat 10 times. Build up to 3 sets of 10.   Resisted shoulder internal rotation: Stand sideways next to a door with your injured arm closest to the door. Tie a knot in the end of the tubing and shut the knot in the door at waist level. Hold the other end of the tubing with the hand of your injured arm. Bend the elbow of your injured arm 90 degrees. Keeping your elbow in at your side, rotate your forearm across your body and then back to the starting position. Make sure you keep your forearm parallel to the floor. Do 3 sets of 10.   Scaption: Stand with your arms at your sides and with your elbows straight. Slowly raise your arms to eye level. As you raise your arms, spread them apart so that they are only slightly in front of your body (at about a 30-degree angle to the front of your body). Point your thumbs toward the ceiling. Hold for 2 seconds and lower your arms slowly. Do 3 sets of 10. Progress to holding a soup can or light weight when you are doing the exercise and increase the weight as the exercise gets easier.   Side-lying external rotation: Lie on your uninjured side with your injured arm at your side and your elbow bent 90 degrees. Keeping your elbow against your side, raise  "your forearm toward the ceiling and hold for 2 seconds. Slowly lower your arm. Do 3 sets of 10. You can start doing this exercise holding a soup can or light weight and gradually increase the weight as long as there is no pain.   Horizontal abduction: Lie on your stomach on a table or the edge of a bed with the arm on your injured side hanging down over the edge. Raise your arm out to the side, with your thumb pointed toward the ceiling, until your arm is parallel to the floor. Hold for 2 seconds and then lower it slowly. Start this exercise with no weight. As you get stronger, add a light weight or hold a soup can. Do 3 sets of 10.   Push-up with a plus: Begin on the floor on your hands and knees. Keep your arms a shoulder width apart and lift your feet off the floor. Arch your back as high as possible and round your shoulders (this is the \"plus\" part or the exercise). Bend your elbows and lower your body to the floor. Return to the starting position and arch your back again. Do 3 sets of 10.   Published by [employer].  This content is reviewed periodically and is subject to change as new health information becomes available. The information is intended to inform and educate and is not a replacement for medical evaluation, advice, diagnosis or treatment by a healthcare professional.   Written by Niecy Randle MS, PT, and Nicki Vyas PT, [facility], hospitals, for [employer].   ? 2010 [facility] and/or its affiliates. All Rights Reserved.   Copyright   [employer] 2011  Adult Health Advisor                      At [facility], we strive to deliver an exceptional experience to you, every time we see you.  If you receive a survey in the mail, please send us back your thoughts. We really do value your feedback.      Suggested websites for health information:  Www.[email or web address] [email or web address] : Up to date and easily searchable information on multiple topics.  Www.medlineplus.gov : medication info, interactive " tutorials, watch real surgeries online  Www.familydoctor.org : good info from the Academy of Family Physicians  Www.cdc.gov : public health info, travel advisories, epidemics (H1N1)  Www.aap.org : children's health info, normal development, vaccinations  Www.health.state.[place].us : [place] dept of health, public health issues in [place], N1N1    Your care team:     Family Medicine   BRET Ty MD Emily Bunt, JARED [facility]   S. MD Melonie Carlisle MD Angela Wermerskirchen, MD         Clinic hours: Monday - Wednesday 7 am-7 pm   Thursdays and Fridays 7 am-5 pm.     [facility]: Monday - Friday 11 am-9 pm,   Saturday and Sunday 9 am-5 pm.    [facility]: Monday -Thursday 8 am-8 pm; Friday 8 am-6 pm; Saturday and Sunday 9 am-5 pm.     [facility]: Monday - Thursday 8 am - 7 pm; Friday 8 am - 6 pm    Clinic: [phone]   [facility]: [phone]   [facility]: [phone]

## 2018-08-13 NOTE — MR AVS SNAPSHOT
After Visit Summary   8/13/2018    Nikci Guerra    MRN: [id number]           Patient Information     Date Of Birth          1945        Visit Information        Provider Department      8/13/2018 9:00 AM Carola Gutierrez MD [facility]        Today's Diagnoses     Left shoulder pain, unspecified chronicity    -  1      Care Instructions      If your shoulder is not improving with physical therapy, you will need an MRI and follow up with orthopedics. I put the referral in and the physical therapist scheduling office will call you.                               Rotator Cuff Injury   What is a rotator cuff injury?   A rotator cuff injury is a strain or tear in the group of tendons and muscles that hold your shoulder joint together and help move your shoulder.   How does it occur?   A rotator cuff injury may result from:     using your arm to break a fall     falling onto your arm     lifting a heavy object     use of your shoulder in sports with a repetitive overhead movement, such as swimming, baseball (mainly pitchers), football, and tennis, which gradually strains the tendon     manual labor such as painting, plastering, raking leaves, or housework   What are the symptoms?   The symptoms of a torn rotator cuff are:     arm and shoulder pain     shoulder weakness     shoulder tenderness     loss of shoulder movement, especially overhead   How is it diagnosed?   Your healthcare provider will examine you and check your shoulder for pain, tenderness, and loss of motion as you move your arm in all directions. Your provider will ask if your shoulder pain began suddenly or gradually. You may have an X-ray to make sure there are not any fractures or bone spurs.   Based on these results, you may have other tests or procedures right away or later, such as:     magnetic resonance imaging (MRI), which creates images of your shoulder and surrounding structures with sound waves      an arthrogram, which is an X-ray or MRI that is taken after a special dye has been injected into your shoulder joint to outline its soft structures     arthroscopy, a surgical procedure in which a small instrument is inserted into your shoulder joint so your provider can look directly at your rotator cuff   What is the treatment?   A tendon in your shoulder can be inflamed, partially torn, or completely torn. What is done about it depends on how torn it is and how much it hurts.   If your tear is a minor one, it can be left to heal by itself if it does not interfere with your everyday activities. Your treatment plan should include:     proper sitting posture, in which your head and shoulders are balanced     rest for your shoulder, which means avoiding strenuous activity or any overhead motion that causes pain     ice packs at least once a day, and preferably 2 or 3 times a day     doing the exercises your healthcare provider gives you     anti-inflammatory drugs. Adults aged 65 years and older should not take non-steroidal anti-inflammatory medicine for more than 7 days without their healthcare provider's approval.     physical therapy to strengthen your shoulder as it heals   If you have a bad tear, you may need to have it repaired by arthroscopy. Arthroscopy can be used to perform surgery on a joint as well as to see inside the joint. The rough edges of a torn tendon can be trimmed and left to heal. Larger tears can be stitched back together. After surgery, your treatment plan will include physical therapy to strengthen your shoulder as it heals.   How long will the effects last?   Full recovery depends on what is torn and how it is treated.   When can I return to my normal activities?   Everyone recovers from an injury at a different rate. Return to your activities will be determined by how soon your shoulder recovers, not by how many days or weeks it has been since your injury has occurred. In general, the  longer you have symptoms before you start treatment, the longer it will take to get better. The goal of rehabilitation is to return you to your normal activities as soon as is safely possible. If you return too soon you may worsen your injury.   You may safely return to your normal activities when:     Your injured shoulder has full range of motion without pain.     Your injured shoulder has regained normal strength compared to the uninjured shoulder.   What can be done to help prevent this from recurring?   The best way to prevent a recurrence is to strengthen your shoulder muscles and keep them in peak condition with shoulder exercises.           Rotator Cuff Strain Rehabilitation Exercises                  You may do all of these exercises right away.   Isometric shoulder external rotation:  a doorway with your elbow bent 90 degrees and the back of the wrist on your injured side pressed against the door frame. Try to press your hand outward into the door frame. Hold for 5 seconds. Do 3 sets of 10.   Isometric shoulder internal rotation:  a doorway with your elbow bent 90 degrees and the front of the wrist on your injured side pressed against the door frame. Try to press your palm into the door frame. Hold for 5 seconds. Do 3 sets of 10.   Wand exercise: Flexion: Stand upright and hold a stick in both hands, palms down. Stretch your arms by lifting them over your head, keeping your arms straight. Hold for 5 seconds and return to the starting position. Repeat 10 times.   Wand exercise: Extension: Stand upright and hold a stick in both hands behind your back. Move the stick away from your back. Hold the end position for 5 seconds. Relax and return to the starting position. Repeat 10 times.   Wand exercise: External rotation: Lie on your back and hold a stick in both hands, palms up. Your upper arms should be resting on the floor with your elbows at your sides and bent 90 degrees. Use your uninjured  arm to push your injured arm out away from your body. Keep the elbow of your injured arm at your side while it is being pushed. Hold the stretch for 5 seconds. Repeat 10 times.   Wand exercise: Shoulder abduction and adduction: Stand and hold a stick with both hands, palms facing away from your body. Rest the stick against the front of your thighs. Use your uninjured arm to push your injured arm out to the side and up as high as possible. Keep your arms straight. Hold for 5 seconds. Repeat 10 times.   Resisted shoulder external rotation: Stand sideways next to a door with your injured arm farther from the door. Tie a knot in the end of the tubing and shut the knot in the door at waist level. Hold the other end of the tubing with the hand of your injured arm. Rest the hand of your injured arm across your stomach. Keeping your elbow in at your side, rotate your arm outward and away from your waist. Make sure you keep your elbow bent 90 degrees and your forearm parallel to the floor. Repeat 10 times. Build up to 3 sets of 10.   Resisted shoulder internal rotation: Stand sideways next to a door with your injured arm closest to the door. Tie a knot in the end of the tubing and shut the knot in the door at waist level. Hold the other end of the tubing with the hand of your injured arm. Bend the elbow of your injured arm 90 degrees. Keeping your elbow in at your side, rotate your forearm across your body and then back to the starting position. Make sure you keep your forearm parallel to the floor. Do 3 sets of 10.   Scaption: Stand with your arms at your sides and with your elbows straight. Slowly raise your arms to eye level. As you raise your arms, spread them apart so that they are only slightly in front of your body (at about a 30-degree angle to the front of your body). Point your thumbs toward the ceiling. Hold for 2 seconds and lower your arms slowly. Do 3 sets of 10. Progress to holding a soup can or light weight  "when you are doing the exercise and increase the weight as the exercise gets easier.   Side-lying external rotation: Lie on your uninjured side with your injured arm at your side and your elbow bent 90 degrees. Keeping your elbow against your side, raise your forearm toward the ceiling and hold for 2 seconds. Slowly lower your arm. Do 3 sets of 10. You can start doing this exercise holding a soup can or light weight and gradually increase the weight as long as there is no pain.   Horizontal abduction: Lie on your stomach on a table or the edge of a bed with the arm on your injured side hanging down over the edge. Raise your arm out to the side, with your thumb pointed toward the ceiling, until your arm is parallel to the floor. Hold for 2 seconds and then lower it slowly. Start this exercise with no weight. As you get stronger, add a light weight or hold a soup can. Do 3 sets of 10.   Push-up with a plus: Begin on the floor on your hands and knees. Keep your arms a shoulder width apart and lift your feet off the floor. Arch your back as high as possible and round your shoulders (this is the \"plus\" part or the exercise). Bend your elbows and lower your body to the floor. Return to the starting position and arch your back again. Do 3 sets of 10.   Published by [employer].  This content is reviewed periodically and is subject to change as new health information becomes available. The information is intended to inform and educate and is not a replacement for medical evaluation, advice, diagnosis or treatment by a healthcare professional.   Written by Niecy Randle, MS, PT, and Nicki Vyas PT, [facility], \A Chronology of [place] [facility]\"", for [employer].   ? 2010 [employer] and/or its affiliates. All Rights Reserved.   Copyright   [employer] 2011  Adult Health Advisor                      At [facility], we strive to deliver an exceptional experience to you, every time we see you.  If you receive a survey in the mail, " please send us back your thoughts. We really do value your feedback.      Suggested websites for health information:  Www.[employer].org : Up to date and easily searchable information on multiple topics.  Www.medlineplus.gov : medication info, interactive tutorials, watch real surgeries online  Www.familydoctor.org : good info from the Academy of Family Physicians  Www.cdc.gov : public health info, travel advisories, epidemics (H1N1)  Www.aap.org : children's health info, normal development, vaccinations  Www.health.[facility].[place].us : [place] dept of health, public health issues in [place], N1N1    Your care team:     Family Medicine   BRET Ty MD Emily Bunt, JARED [facility]   S. MD Melonie Carlisle MD Angela Wermerskirchen, MD         Clinic hours: Monday - Wednesday 7 am-7 pm   Thursdays and Fridays 7 am-5 pm.     [facility]: Monday - Friday 11 am-9 pm,   Saturday and Sunday 9 am-5 pm.    [facility]: Monday -Thursday 8 am-8 pm; Friday 8 am-6 pm; Saturday and Sunday 9 am-5 pm.     [facility]: Monday - Thursday 8 am - 7 pm; Friday 8 am - 6 pm    Clinic: [phone]   [facility]: [phone]   [facility]: [phone]                  Follow-ups after your visit        Additional Services     [facility] PT, HAND, AND CHIROPRACTIC REFERRAL       **This order will print in the [facility] Scheduling Office**    Physical Therapy, Hand Therapy and Chiropractic Care are available through:    *[facility]  *[facility]  *[facility]    Call one number to schedule at any of the above locations: [phone].    Your provider has referred you to: Physical Therapy at [facility] or [facility]    Indication/Reason for Referral: Shoulder Pain  Onset of Illness: months  Therapy Orders: Evaluate and Treat  Special Programs: None  Special Request: None    Erik Valencia      Additional  "Comments for the Therapist or Chiropractor: will need MRI and ortho referral if not making good progress with therapy- please keep me posted.     Please be aware that coverage of these services is subject to the terms and limitations of your health insurance plan.  Call member services at your health plan with any benefit or coverage questions.      Please bring the following to your appointment:    *Your personal calendar for scheduling future appointments  *Comfortable clothing                  Who to contact     If you have questions or need follow up information about today's clinic visit or your schedule please contact [facility] directly at [phone].  Normal or non-critical lab and imaging results will be communicated to you by Vud[employer]art, letter or phone within 4 business days after the clinic has received the results. If you do not hear from us within 7 days, please contact the clinic through Hasht[employer] or phone. If you have a critical or abnormal lab result, we will notify you by phone as soon as possible.  Submit refill requests through [employer] or call your pharmacy and they will forward the refill request to us. Please allow 3 business days for your refill to be completed.          Additional Information About Your Visit        Vuduhart Information     [employer] gives you secure access to your electronic health record. If you see a primary care provider, you can also send messages to your care team and make appointments. If you have questions, please call your primary care clinic.  If you do not have a primary care provider, please call [phone] and they will assist you.        Care EveryWhere ID     This is your Care EveryWhere ID. This could be used by other organizations to access your [place] medical records  [id number]        Your Vitals Were     Pulse Temperature Respirations Height Last Period Pulse Oximetry    64 98.1  F (36.7  C) (Oral) 17 1.632 m (5' 4.25\") (Exact Date) 99%    " Breastfeeding? BMI (Body Mass Index)                No 22.48 kg/m2           Blood Pressure from Last 3 Encounters:   08/13/18 128/72   07/17/17 104/58   03/23/17 118/66    Weight from Last 3 Encounters:   08/13/18 59.9 kg (132 lb)   07/17/17 57.9 kg (127 lb 9.6 oz)   03/23/17 58.1 kg (128 lb)              We Performed the Following     [facility] PT, HAND, AND CHIROPRACTIC REFERRAL          Today's Medication Changes          These changes are accurate as of 8/13/18 10:05 AM.  If you have any questions, ask your nurse or doctor.               These medicines have changed or have updated prescriptions.        Dose/Directions    WELLBUTRIN  MG 12 hr tablet   This may have changed:  Another medication with the same name was removed. Continue taking this medication, and follow the directions you see here.   Used for:  Left shoulder pain, unspecified chronicity   Generic drug:  buPROPion   Changed by:  Carola Gutierrez MD        Dose:  200 mg   Take 200 mg by mouth daily   Refills:  0                Primary Care Provider Office Phone # Fax #    Melonie Lucia [phone] [address]  [facility] [address]        Equal Access to Services     [facility] : Hadii aad ku hadasho Somichaelali, waaxda luqadaha, qaybta kaalmada adeegyada, haroon morrow. So [facility] [phone].    ATENCIÓN: Si habla español, tiene a ortega disposición servicios gratuitos de asistencia lingüística. Llame al [phone].    We comply with applicable federal civil rights laws and [place] laws. We do not discriminate on the basis of race, color, national origin, age, disability, sex, sexual orientation, or gender identity.            Thank you!     Thank you for choosing [facility]  for your care. Our goal is always to provide you with excellent care. Hearing back from our patients is one way we can continue to improve our services. Please take a few minutes to complete  the written survey that you may receive in the mail after your visit with us. Thank you!             Your Updated Medication List - Protect others around you: Learn how to safely use, store and throw away your medicines at www.disposemymeds.org.          This list is accurate as of 8/13/18 10:05 AM.  Always use your most recent med list.                   Brand Name Dispense Instructions for use Diagnosis    CALCIUM + D PO      Take 600 mg by mouth    Routine general medical examination at a health care facility       FLUoxetine HCl (PMDD) 20 MG Caps      Take 1 capsule by mouth daily.        MULTIVITAMINS PO      1 daily    Routine general medical examination at a health care facility       vitamin C CR 1000 MG Tbcr      1 TABLET DAILY        WELLBUTRIN  MG 12 hr tablet   Generic drug:  buPROPion      Take 200 mg by mouth daily    Left shoulder pain, unspecified chronicity

## 2018-08-13 NOTE — PROGRESS NOTES
SUBJECTIVE:   Nicki Guerra is a 73 year old female who presents to clinic today for the following health issues:    Joint Pain    Onset: 2 months, though episodes of pain have occurred over a longer period of time than 2 moths     Description:   Location: left shoulder  Character: Sharp    Intensity: moderate, severe    Progression of Symptoms: worse    Accompanying Signs & Symptoms:  Other symptoms: some weakness. No numbness or tingling     History:   Previous similar pain: no       Precipitating factors:   Trauma or overuse: no. Patient reports she is very active and lifts heavy things so it is possible she injured it but does not remember a specific incident     Alleviating factors:  Improved by: aleve helps to get to sleep. Keeping arm at side reduces pain     Therapies Tried and outcome: aleve            Problem list and histories reviewed & adjusted, as indicated.  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 Father      AGE 84, STENTS     Cancer Father      kidney     Breast  "Cancer Maternal Grandmother      Prostate Cancer Paternal Grandfather      Alzheimer Disease Mother      Arthritis Mother      Depression Mother      Psychotic Disorder Brother      SCHITZOPHRENIA     Breast Cancer Other      GASTROINTESTINAL DISEASE Brother      REFLUX     GASTROINTESTINAL DISEASE Brother      Bladder Cancer Brother      Alzheimer Disease Brother      early onset     Cancer - colorectal No family hx of      Thyroid Disease No family hx of            Reviewed and updated as needed this visit by clinical staff       Reviewed and updated as needed this visit by Provider         ROS:  Constitutional, HEENT, cardiovascular, pulmonary, gi and gu systems are negative, except as otherwise noted.    This document serves as a record of the services and decisions personally performed by MARILU DUDLEY. It was created on his/her behalf by Lydia Mata, a trained medical scribe. The creation of this document is based on the provider's statements to the medical scribe. Lydia Mata, August 13, 2018 9:35 AM  OBJECTIVE:     /72  Pulse 64  Temp 98.1  F (36.7  C) (Oral)  Resp 17  Ht 1.632 m (5' 4.25\")  Wt 59.9 kg (132 lb)  LMP  (Exact Date)  SpO2 99%  Breastfeeding? No  BMI 22.48 kg/m2  Body mass index is 22.48 kg/(m^2).  GENERAL: healthy, alert and no distress  NECK: no adenopathy, no asymmetry, masses, or scars and thyroid normal to palpation  RESP: lungs clear to auscultation - no rales, rhonchi or wheezes  CV: regular rate and rhythm, normal S1 S2, no S3 or S4, no murmur, click or rub, no peripheral edema and peripheral pulses strong  MS: Left anterior and posterior rotator cuff tenderness. Weak internal rotation of left upper extremity. Range of motion limited secondary to pain: 80 degrees abduction, 90 degrees with extension.  PSYCH: mentation appears normal, affect normal/bright    Diagnostic Test Results: none    XR Left shoulder: mild narrowing of AC joint, otherwise no acute " fracture or malalignment.  personally reviewed and evaluated by me    ASSESSMENT/PLAN:     1. Left shoulder pain, unspecified chronicity  Patient complains of left shoulder pain onset 2 months ago. XR showed narrowing of AC joint with no other acute findings. Possible rotator cuff tear but after discussion will start with conservative cares. Patient referred to PHYSICAL THERAPY and advised to monitor closely for improvement as will need MRI and orthopedics referral if nto making significant progress  - buPROPion (WELLBUTRIN SR) 200 MG 12 hr tablet; Take 200 mg by mouth daily  - XR Shoulder Left 2 Views; Future  - [facility] PT, HAND, AND CHIROPRACTIC REFERRAL      Patient Instructions       If your shoulder is not improving with physical therapy, you will need an MRI and follow up with orthopedics. I put the referral in and the physical therapist scheduling office will call you.              Ice your shoulder before bed for pain relief. You can continue to take Aleve for pain management, just make sure to take it with food.                 Rotator Cuff Injury   What is a rotator cuff injury?   A rotator cuff injury is a strain or tear in the group of tendons and muscles that hold your shoulder joint together and help move your shoulder.   How does it occur?   A rotator cuff injury may result from:     using your arm to break a fall     falling onto your arm     lifting a heavy object     use of your shoulder in sports with a repetitive overhead movement, such as swimming, baseball (mainly pitchers), football, and tennis, which gradually strains the tendon     manual labor such as painting, plastering, raking leaves, or housework   What are the symptoms?   The symptoms of a torn rotator cuff are:     arm and shoulder pain     shoulder weakness     shoulder tenderness     loss of shoulder movement, especially overhead   How is it diagnosed?   Your healthcare provider will examine you and check your shoulder for pain,  tenderness, and loss of motion as you move your arm in all directions. Your provider will ask if your shoulder pain began suddenly or gradually. You may have an X-ray to make sure there are not any fractures or bone spurs.   Based on these results, you may have other tests or procedures right away or later, such as:     magnetic resonance imaging (MRI), which creates images of your shoulder and surrounding structures with sound waves     an arthrogram, which is an X-ray or MRI that is taken after a special dye has been injected into your shoulder joint to outline its soft structures     arthroscopy, a surgical procedure in which a small instrument is inserted into your shoulder joint so your provider can look directly at your rotator cuff   What is the treatment?   A tendon in your shoulder can be inflamed, partially torn, or completely torn. What is done about it depends on how torn it is and how much it hurts.   If your tear is a minor one, it can be left to heal by itself if it does not interfere with your everyday activities. Your treatment plan should include:     proper sitting posture, in which your head and shoulders are balanced     rest for your shoulder, which means avoiding strenuous activity or any overhead motion that causes pain     ice packs at least once a day, and preferably 2 or 3 times a day     doing the exercises your healthcare provider gives you     anti-inflammatory drugs. Adults aged 65 years and older should not take non-steroidal anti-inflammatory medicine for more than 7 days without their healthcare provider's approval.     physical therapy to strengthen your shoulder as it heals   If you have a bad tear, you may need to have it repaired by arthroscopy. Arthroscopy can be used to perform surgery on a joint as well as to see inside the joint. The rough edges of a torn tendon can be trimmed and left to heal. Larger tears can be stitched back together. After surgery, your treatment plan  will include physical therapy to strengthen your shoulder as it heals.   How long will the effects last?   Full recovery depends on what is torn and how it is treated.   When can I return to my normal activities?   Everyone recovers from an injury at a different rate. Return to your activities will be determined by how soon your shoulder recovers, not by how many days or weeks it has been since your injury has occurred. In general, the longer you have symptoms before you start treatment, the longer it will take to get better. The goal of rehabilitation is to return you to your normal activities as soon as is safely possible. If you return too soon you may worsen your injury.   You may safely return to your normal activities when:     Your injured shoulder has full range of motion without pain.     Your injured shoulder has regained normal strength compared to the uninjured shoulder.   What can be done to help prevent this from recurring?   The best way to prevent a recurrence is to strengthen your shoulder muscles and keep them in peak condition with shoulder exercises.           Rotator Cuff Strain Rehabilitation Exercises                  You may do all of these exercises right away.   Isometric shoulder external rotation:  a doorway with your elbow bent 90 degrees and the back of the wrist on your injured side pressed against the door frame. Try to press your hand outward into the door frame. Hold for 5 seconds. Do 3 sets of 10.   Isometric shoulder internal rotation:  a doorway with your elbow bent 90 degrees and the front of the wrist on your injured side pressed against the door frame. Try to press your palm into the door frame. Hold for 5 seconds. Do 3 sets of 10.   Wand exercise: Flexion: Stand upright and hold a stick in both hands, palms down. Stretch your arms by lifting them over your head, keeping your arms straight. Hold for 5 seconds and return to the starting position. Repeat 10  times.   Wand exercise: Extension: Stand upright and hold a stick in both hands behind your back. Move the stick away from your back. Hold the end position for 5 seconds. Relax and return to the starting position. Repeat 10 times.   Wand exercise: External rotation: Lie on your back and hold a stick in both hands, palms up. Your upper arms should be resting on the floor with your elbows at your sides and bent 90 degrees. Use your uninjured arm to push your injured arm out away from your body. Keep the elbow of your injured arm at your side while it is being pushed. Hold the stretch for 5 seconds. Repeat 10 times.   Wand exercise: Shoulder abduction and adduction: Stand and hold a stick with both hands, palms facing away from your body. Rest the stick against the front of your thighs. Use your uninjured arm to push your injured arm out to the side and up as high as possible. Keep your arms straight. Hold for 5 seconds. Repeat 10 times.   Resisted shoulder external rotation: Stand sideways next to a door with your injured arm farther from the door. Tie a knot in the end of the tubing and shut the knot in the door at waist level. Hold the other end of the tubing with the hand of your injured arm. Rest the hand of your injured arm across your stomach. Keeping your elbow in at your side, rotate your arm outward and away from your waist. Make sure you keep your elbow bent 90 degrees and your forearm parallel to the floor. Repeat 10 times. Build up to 3 sets of 10.   Resisted shoulder internal rotation: Stand sideways next to a door with your injured arm closest to the door. Tie a knot in the end of the tubing and shut the knot in the door at waist level. Hold the other end of the tubing with the hand of your injured arm. Bend the elbow of your injured arm 90 degrees. Keeping your elbow in at your side, rotate your forearm across your body and then back to the starting position. Make sure you keep your forearm parallel to  "the floor. Do 3 sets of 10.   Scaption: Stand with your arms at your sides and with your elbows straight. Slowly raise your arms to eye level. As you raise your arms, spread them apart so that they are only slightly in front of your body (at about a 30-degree angle to the front of your body). Point your thumbs toward the ceiling. Hold for 2 seconds and lower your arms slowly. Do 3 sets of 10. Progress to holding a soup can or light weight when you are doing the exercise and increase the weight as the exercise gets easier.   Side-lying external rotation: Lie on your uninjured side with your injured arm at your side and your elbow bent 90 degrees. Keeping your elbow against your side, raise your forearm toward the ceiling and hold for 2 seconds. Slowly lower your arm. Do 3 sets of 10. You can start doing this exercise holding a soup can or light weight and gradually increase the weight as long as there is no pain.   Horizontal abduction: Lie on your stomach on a table or the edge of a bed with the arm on your injured side hanging down over the edge. Raise your arm out to the side, with your thumb pointed toward the ceiling, until your arm is parallel to the floor. Hold for 2 seconds and then lower it slowly. Start this exercise with no weight. As you get stronger, add a light weight or hold a soup can. Do 3 sets of 10.   Push-up with a plus: Begin on the floor on your hands and knees. Keep your arms a shoulder width apart and lift your feet off the floor. Arch your back as high as possible and round your shoulders (this is the \"plus\" part or the exercise). Bend your elbows and lower your body to the floor. Return to the starting position and arch your back again. Do 3 sets of 10.   Published by [employer].  This content is reviewed periodically and is subject to change as new health information becomes available. The information is intended to inform and educate and is not a replacement for medical evaluation, " advice, diagnosis or treatment by a healthcare professional.   Written by Niecy Randle, MS, PT, and Nicki Vyas, PT, [facility], OCS, for [facility].   ? 2010 [facility] and/or its affiliates. All Rights Reserved.   Copyright   [employer] Reference Systems 2011  Adult Health Advisor                      At [facility], we strive to deliver an exceptional experience to you, every time we see you.  If you receive a survey in the mail, please send us back your thoughts. We really do value your feedback.      Suggested websites for health information:  Www.OneBuild.org : Up to date and easily searchable information on multiple topics.  Www.Jiff.gov : medication info, interactive tutorials, watch real surgeries online  Www.familydoctor.org : good info from the Academy of Family Physicians  Www.cdc.gov : public health info, travel advisories, epidemics (H1N1)  Www.aap.org : children's health info, normal development, vaccinations  Www.health.[facility].[place].us : [place] dept of health, public health issues in [place], N1N1    Your care team:     Family Medicine   BRET Ty MD Emily Bunt, JARED CNP   S. MD Melonie Carlisle MD Angela Wermerskirchen, MD         Clinic hours: Monday - Wednesday 7 am-7 pm   Thursdays and Fridays 7 am-5 pm.     [facility]: Monday - Friday 11 am-9 pm,   Saturday and Sunday 9 am-5 pm.    [facility]: Monday -Thursday 8 am-8 pm; Friday 8 am-6 pm; Saturday and Sunday 9 am-5 pm.     [facility]: Monday - Thursday 8 am - 7 pm; Friday 8 am - 6 pm    Clinic: [phone]   [facility]: [phone]   [facility]: [phone]              Length of visit was 16 minutes with more than 50 percent of that time used for discussing medical concerns and education      Carola Gutierrez MD  [facility]    The information in this document, created by  the medical scribe Lydia Mata for me, accurately reflects the services I personally performed and the decisions made by me. I have reviewed and approved this document for accuracy prior to leaving the patient care area.

## 2018-08-14 ASSESSMENT — PATIENT HEALTH QUESTIONNAIRE - PHQ9: SUM OF ALL RESPONSES TO PHQ QUESTIONS 1-9: 11

## 2018-08-14 ASSESSMENT — ANXIETY QUESTIONNAIRES: GAD7 TOTAL SCORE: 4

## 2018-10-01 ENCOUNTER — THERAPY VISIT (OUTPATIENT)
Dept: PHYSICAL THERAPY | Facility: CLINIC | Age: 73
End: 2018-10-01
Payer: COMMERCIAL

## 2018-10-01 DIAGNOSIS — M25.512 ACUTE PAIN OF LEFT SHOULDER: Primary | ICD-10-CM

## 2018-10-01 PROCEDURE — 97110 THERAPEUTIC EXERCISES: CPT | Mod: GP | Performed by: PHYSICAL THERAPIST

## 2018-10-01 PROCEDURE — 97161 PT EVAL LOW COMPLEX 20 MIN: CPT | Mod: GP | Performed by: PHYSICAL THERAPIST

## 2018-10-01 PROCEDURE — 97112 NEUROMUSCULAR REEDUCATION: CPT | Mod: GP | Performed by: PHYSICAL THERAPIST

## 2018-10-01 NOTE — LETTER
[facility]  [address]  [place] [address]  [phone]    2018    Re: Nicki Guerra   :   1945  MRN:  [id number]   REFERRING PHYSICIAN:   Carola Gutierrez    [facility] PH[facility]ICAL [facility]    Date of Initial Evaluation: 10/01/2018  Visits:  Rxs Used: 1  Reason for Referral:  Acute pain of left shoulder    EVALUATION SUMMARY    Subjective:  Patient is a 73 year old female presenting with rehab left shoulder hpi. The history is provided by the patient. No  was used.   Nicki Guerra is a 73 year old female with a left shoulder condition.  Condition occurred with:  Unknown cause.  Condition occurred: for unknown reasons.  This is a new condition  On or about 7/15/2018, I started to have left shoulder pain.  I did have to move my daughter and had to some gardening, but not sure what the cause was.  My Left side is generally stronger than right.  I had Left carpal tunnel surgery .  I saw the MD on 2018.    Patient reports pain:  Anterior, posterior and scapular area.  Radiates to:  Shoulder, upper arm, lower arm and elbow.  Pain is described as aching (dull) and is constant and reported as 6/10 (ranges 5-9/10).  Associated symptoms:  Loss of strength. Pain is the same all the time.  Symptoms are exacerbated by using arm at shoulder level, using arm behind back, lying on extremity and using arm overhead (can't sleep on that side) and relieved by ice and rest (aleve).  Since onset symptoms are gradually improving.  Special tests:  X-ray (not much arthritis).  Previous treatment includes physical therapy (other reasons).      Pertinent medical history includes:  Depression (low back siatica, right hip pain ).  Medical allergies: yes (penicillin, IB).  Other surgeries include:  Orthopedic surgery and other (left carpal tunnel, DNC).  Current  medications:  Anti-depressants.  Current occupation is , , PCA.  .  Patient is working in normal job without restrictions (self restriction).  Primary job tasks include:  Lifting, driving and prolonged sitting.  Barriers: condo, 1 flight of steps.  Red flags:  None as reported by the patient.                  Objective:  Standing Alignment:    Cervical/Thoracic:  Forward head (fair sitting posture)  Shoulder/UE:  Rounded shoulders and scapular downward rotation L  Lumbar:  Lordosis decr  General Deviations:  Toe out R and toe out L  Flexibility/Screens:   Positive screens:  Cervical and Shoulder  Re: Nicki Guerra   :   1945    Upper Extremity:    Decreased left upper extremity flexibility at:  Pectoralis Major  Spine:  Decreased left spine flexibility:  Scalenes; Upper Trap and Levator    Cervical/Thoracic Evaluation  AROM:  AROM Cervical:  Flexion:          WFL pulling  Extension:       Extension 75% of motion with pain in the shoulder  Rotation:         Left: minimal with pain in the left arm      Right: minimal movement  Side Bend:      Left:     Right:     Shoulder Evaluation:  ROM:  AROM:    Flexion:  Left:  125 with pain    Right:  WFL  Abduction:  Left: 90 with pain    Right:  WFL  Flexion/External Rotation:  Left:  Neck    Right:  Neck  Extension/Internal Rotation:  Left:  Sacrum    Right:  Mid thoracic    Pain: reach opposite shoulder right WFL, left 75% of motion  Strength:    Flexion: Left:4/5    Pain: +    Right: 5/5     Pain:   Abduction:  Left: 4/5   Pain:+    Right: 5/5     Pain:  Adduction:  Left: 5/5    Pain:    Right: 5/5     Pain:  Internal Rotation:  Left:4+/5      Pain:-/+      External Rotation:   Left:5-/5      Pain:-/+   Right:5/5     Pain:    Elbow Flexion:  Left:5/5     Pain:    Right:5/5     Pain:  Elbow Extension:  Left:4+/5      Pain:-/+    Right:5/5     Pain:  Stability Testing:    Left shoulder stability positive testing:  Relocation and Sulcus  sign  Special Tests:    Left shoulder positive for the following special tests:  Bursal  Palpation:    Left shoulder tenderness present at:  Acrimioclavicular; Biceps; Supraspinatus; Infraspinatus; Teres Minor; Levator and Upper Trap  Mobility Tests:    Glenohumeral anterior left:  Hypermobile    Glenohumeral posterior left:  Hypermobile    Glenohumeral inferior left:  Hypermobile    Scapulothoracic left:  Hypermobile    Scapulohumeral rhythm right:  Hypermobile          Assessment/Plan:    Patient is a 73 year old female with left side shoulder complaints.    Patient has the following significant findings with corresponding treatment plan.                Diagnosis 1:  Left shoulder with possible cervical involvement   Pain -  manual therapy, self management, education, directional preference exercise and home program  Decreased ROM/flexibility - manual therapy, therapeutic exercise, therapeutic activity and home program  Decreased strength - therapeutic exercise, therapeutic activities and home program  Re: Nicki Guerra   :   1945    Impaired muscle performance - neuro re-education and home program  Decreased function - therapeutic activities and home program  Impaired posture - neuro re-education, therapeutic activities and home program  Instability -  Therapeutic Activity,Therapeutic Exercise,Neuromuscular Re-education,home program  Evaluation ongoing.    Therapy Evaluation Codes:   1) History comprised of:   Personal factors that impact the plan of care:      None.    Comorbidity factors that impact the plan of care are:      Depression.     Medications impacting care: Anti-depressant.  2) Examination of Body Systems comprised of:   Body structures and functions that impact the plan of care:      Cervical spine, Shoulder and Thoracic Spine.   Activity limitations that impact the plan of care are:      Bathing, Cooking, Dressing, Grasping, Lifting, Working, Sleeping, Laying down and  reaching.  3) Clinical presentation characteristics are:   Evolving/Changing.  4) Decision-Making    Moderate complexity using standardized patient assessment instrument and/or measureable assessment of functional outcome.  Cumulative Therapy Evaluation is: Low complexity.    Previous and current functional limitations:  (See Goal Flow Sheet for this information)    Short term and Long term goals: (See Goal Flow Sheet for this information)   Communication ability:  Patient appears to be able to clearly communicate and understand verbal and written communication and follow directions correctly.  Treatment Explanation - The following has been discussed with the patient:   RX ordered/plan of care  Possible risks and side effects  This patient would benefit from PT intervention to resume normal activities.   Frequency:  2 X week, once daily  Duration:  for 1 weeks tapering to 1 X a week over 6 weeks  Discharge Plan:  Achieve all LTG.  Independent in home treatment program.    Thank you for your referral.    INQUIRIES  Therapist:Flor Ferro, PT  [facility]  [address] [address]  Phone: [phone]  Fax: [phone]

## 2018-10-01 NOTE — PROGRESS NOTES
[facility] Initial Evaluation  Subjective:  Patient is a 73 year old female presenting with rehab left shoulder hpi. The history is provided by the patient. No  was used.   Nicki Guerra is a 73 year old female with a left shoulder condition.  Condition occurred with:  Unknown cause.  Condition occurred: for unknown reasons.  This is a new condition  On or about 7/15/2018, I started to have left shoulder pain.  I did have to move my daughter and had to some gardening, but not sure what the cause was.  My Left side is generally stronger than right.  I had Left carpal tunnel surgery 2010.  I saw the MD on 8/13/2018..    Patient reports pain:  Anterior, posterior and scapular area.  Radiates to:  Shoulder, upper arm, lower arm and elbow.  Pain is described as aching (dull) and is constant and reported as 6/10 (ranges 5-9/10).  Associated symptoms:  Loss of strength. Pain is the same all the time.  Symptoms are exacerbated by using arm at shoulder level, using arm behind back, lying on extremity and using arm overhead (can't sleep on that side) and relieved by ice and rest (aleve).  Since onset symptoms are gradually improving.  Special tests:  X-ray (not much arthritis).  Previous treatment includes physical therapy (other reasons).      Pertinent medical history includes:  Depression (low back siatica, right hip pain ).  Medical allergies: yes (penicillin, IB).  Other surgeries include:  Orthopedic surgery and other (left carpal tunnel, DNC).  Current medications:  Anti-depressants.  Current occupation is , , PCA.  .  Patient is working in normal job without restrictions (self restriction).  Primary job tasks include:  Lifting, driving and prolonged sitting.    Barriers: condo, 1 flight of steps.    Red flags:  None as reported by the patient.                        Objective:  Standing Alignment:    Cervical/Thoracic:  Forward head (fair sitting  posture)  Shoulder/UE:  Rounded shoulders and scapular downward rotation L  Lumbar:  Lordosis decr          General Deviations:  Toe out R and toe out L      Flexibility/Screens:   Positive screens:  Cervical and Shoulder  Upper Extremity:    Decreased left upper extremity flexibility at:  Pectoralis Major      Spine:  Decreased left spine flexibility:  Scalenes; Upper Trap and Levator                    Cervical/Thoracic Evaluation    AROM:  AROM Cervical:    Flexion:          WFL pulling  Extension:       Extension 75% of motion with pain in the shoulder  Rotation:         Left: minimal with pain in the left arm      Right: minimal movement  Side Bend:      Left:     Right:                                Shoulder Evaluation:  ROM:  AROM:    Flexion:  Left:  125 with pain    Right:  WFL    Abduction:  Left: 90 with pain    Right:  WFL                Flexion/External Rotation:  Left:  Neck    Right:  Neck  Extension/Internal Rotation:  Left:  Sacrum    Right:  Mid thoracic      Pain: reach opposite shoulder right WFL, left 75% of motion    Strength:    Flexion: Left:4/5    Pain: +    Right: 5/5     Pain:     Abduction:  Left: 4/5   Pain:+    Right: 5/5     Pain:  Adduction:  Left: 5/5    Pain:    Right: 5/5     Pain:  Internal Rotation:  Left:4+/5      Pain:-/+      External Rotation:   Left:5-/5      Pain:-/+   Right:5/5     Pain:        Elbow Flexion:  Left:5/5     Pain:    Right:5/5     Pain:  Elbow Extension:  Left:4+/5      Pain:-/+    Right:5/5     Pain:  Stability Testing:    Left shoulder stability positive testing:  Relocation and Sulcus sign    Special Tests:    Left shoulder positive for the following special tests:  Bursal    Palpation:    Left shoulder tenderness present at:  Acrimioclavicular; Biceps; Supraspinatus; Infraspinatus; Teres Minor; Levator and Upper Trap    Mobility Tests:    Glenohumeral anterior left:  Hypermobile    Glenohumeral posterior left:  Hypermobile    Glenohumeral inferior left:   Hypermobile      Scapulothoracic left:  Hypermobile      Scapulohumeral rhythm right:  Hypermobile                                     General     ROS    Assessment/Plan:    Patient is a 73 year old female with left side shoulder complaints.    Patient has the following significant findings with corresponding treatment plan.                Diagnosis 1:  Left shoulder with possible cervical involvement   Pain -  manual therapy, self management, education, directional preference exercise and home program  Decreased ROM/flexibility - manual therapy, therapeutic exercise, therapeutic activity and home program  Decreased strength - therapeutic exercise, therapeutic activities and home program  Impaired muscle performance - neuro re-education and home program  Decreased function - therapeutic activities and home program  Impaired posture - neuro re-education, therapeutic activities and home program  Instability -  Therapeutic Activity,Therapeutic Exercise,Neuromuscular Re-education,home program  Evaluation ongoing.    Therapy Evaluation Codes:   1) History comprised of:   Personal factors that impact the plan of care:      None.    Comorbidity factors that impact the plan of care are:      Depression.     Medications impacting care: Anti-depressant.  2) Examination of Body Systems comprised of:   Body structures and functions that impact the plan of care:      Cervical spine, Shoulder and Thoracic Spine.   Activity limitations that impact the plan of care are:      Bathing, Cooking, Dressing, Grasping, Lifting, Working, Sleeping, Laying down and reaching.  3) Clinical presentation characteristics are:   Evolving/Changing.  4) Decision-Making    Moderate complexity using standardized patient assessment instrument and/or measureable assessment of functional outcome.  Cumulative Therapy Evaluation is: Low complexity.    Previous and current functional limitations:  (See Goal Flow Sheet for this information)    Short term  and Long term goals: (See Goal Flow Sheet for this information)     Communication ability:  Patient appears to be able to clearly communicate and understand verbal and written communication and follow directions correctly.  Treatment Explanation - The following has been discussed with the patient:   RX ordered/plan of care  Possible risks and side effects  This patient would benefit from PT intervention to resume normal activities.       Frequency:  2 X week, once daily  Duration:  for 1 weeks tapering to 1 X a week over 6 weeks  Discharge Plan:  Achieve all LTG.  Independent in home treatment program.    Please refer to the daily flowsheet for treatment today, total treatment time and time spent performing 1:1 timed codes.

## 2018-10-01 NOTE — MR AVS SNAPSHOT
After Visit Summary   10/1/2018    Nicki Guerra    MRN: [id number]           Patient Information     Date Of Birth          1945        Visit Information        Provider Department      10/1/2018 10:20 AM Flor Ferro PT [facility] Physical Therapy        Today's Diagnoses     Acute pain of left shoulder    -  1       Follow-ups after your visit        Your next 10 appointments already scheduled     Oct 05, 2018  9:00 AM CDT   ODALYS Extremity with Flor Ferro PT   [facility] Physical Therapy ([facility])    [address] [place] [address] [address]  [facility] [address]   [phone]            Oct 10, 2018  9:40 AM CDT   ODALYS Extremity with Flor Ferro PT   [facility] Physical Therapy ([facility])    [address] [place] [address] [address]  [facility] [address]   [phone]              Who to contact     If you have questions or need follow up information about today's clinic visit or your schedule please contact [facility] PHYSICAL THERAPY directly at [phone].  Normal or non-critical lab and imaging results will be communicated to you by MyChart, letter or phone within 4 business days after the clinic has received the results. If you do not hear from us within 7 days, please contact the clinic through [employer] or phone. If you have a critical or abnormal lab result, we will notify you by phone as soon as possible.  Submit refill requests through [employer] or call your pharmacy and they will forward the refill request to us. Please allow 3 business days for your refill to be completed.          Additional Information About Your Visit        MyChart Information     [employer] gives you secure access to your electronic health record. If you see a primary care provider, you can also send messages to your care team  and make appointments. If you have questions, please call your primary care clinic.  If you do not have a primary care provider, please call [phone] and they will assist you.        Care EveryWhere ID     This is your Care EveryWhere ID. This could be used by other organizations to access your [place] medical records  [id number]         Blood Pressure from Last 3 Encounters:   08/13/18 128/72   07/17/17 104/58   03/23/17 118/66    Weight from Last 3 Encounters:   08/13/18 59.9 kg (132 lb)   07/17/17 57.9 kg (127 lb 9.6 oz)   03/23/17 58.1 kg (128 lb)              We Performed the Following     HC PT EVAL, LOW COMPLEXITY     ODALYS INITIAL EVAL REPORT     NEUROMUSCULAR RE-EDUCATION     THERAPEUTIC EXERCISES        Primary Care Provider Office Phone # Fax #    Melonie Lucia [phone] [address]  [facility] [address]        Equal Access to Services     FRANCIS [facility] : Hadii aad ku hadasho Soomaali, waaxda luqadaha, qaybta kaalmada adeegyada, waxay idiin hayaan mariolaeg alvaroaramarybeth moreno . So [facility] [phone].    ATENCIÓN: Si habla español, tiene a ortega disposición servicios gratuitos de asistencia lingüística. [facility] [phone].    We comply with applicable federal civil rights laws and [place] laws. We do not discriminate on the basis of race, color, national origin, age, disability, sex, sexual orientation, or gender identity.            Thank you!     Thank you for choosing [facility] PHYSICAL THERAP[facility]  for your care. Our goal is always to provide you with excellent care. Hearing back from our patients is one way we can continue to improve our services. Please take a few minutes to complete the written survey that you may receive in the mail after your visit with us. Thank you!             Your Updated Medication List - Protect others around you: Learn how to safely use, store and throw away your medicines at www.disposemymeds.org.           This list is accurate as of 10/1/18 11:59 PM.  Always use your most recent med list.                   Brand Name Dispense Instructions for use Diagnosis    CALCIUM + D PO      Take 600 mg by mouth    Routine general medical examination at a health care facility       FLUoxetine HCl (PMDD) 20 MG Caps      Take 1 capsule by mouth daily.        MULTIVITAMINS PO      1 daily    Routine general medical examination at a health care facility       vitamin C CR 1000 MG Tbcr      1 TABLET DAILY        WELLBUTRIN  MG 12 hr tablet   Generic drug:  buPROPion      Take 200 mg by mouth daily    Left shoulder pain, unspecified chronicity

## 2018-10-02 PROBLEM — M25.512 ACUTE PAIN OF LEFT SHOULDER: Status: ACTIVE | Noted: 2018-10-02

## 2018-10-05 ENCOUNTER — THERAPY VISIT (OUTPATIENT)
Dept: PHYSICAL THERAPY | Facility: CLINIC | Age: 73
End: 2018-10-05
Payer: COMMERCIAL

## 2018-10-05 DIAGNOSIS — M25.512 ACUTE PAIN OF LEFT SHOULDER: ICD-10-CM

## 2018-10-05 PROCEDURE — 97110 THERAPEUTIC EXERCISES: CPT | Mod: GP | Performed by: PHYSICAL THERAPIST

## 2018-10-05 PROCEDURE — 97140 MANUAL THERAPY 1/> REGIONS: CPT | Mod: GP | Performed by: PHYSICAL THERAPIST

## 2018-10-15 ENCOUNTER — THERAPY VISIT (OUTPATIENT)
Dept: PHYSICAL THERAPY | Facility: CLINIC | Age: 73
End: 2018-10-15
Payer: COMMERCIAL

## 2018-10-15 DIAGNOSIS — M25.512 ACUTE PAIN OF LEFT SHOULDER: ICD-10-CM

## 2018-10-15 PROCEDURE — 97140 MANUAL THERAPY 1/> REGIONS: CPT | Mod: GP | Performed by: PHYSICAL THERAPIST

## 2018-10-15 PROCEDURE — 97110 THERAPEUTIC EXERCISES: CPT | Mod: GP | Performed by: PHYSICAL THERAPIST

## 2018-10-15 PROCEDURE — 97112 NEUROMUSCULAR REEDUCATION: CPT | Mod: GP | Performed by: PHYSICAL THERAPIST

## 2018-10-24 ENCOUNTER — THERAPY VISIT (OUTPATIENT)
Dept: PHYSICAL THERAPY | Facility: CLINIC | Age: 73
End: 2018-10-24
Payer: COMMERCIAL

## 2018-10-24 DIAGNOSIS — M25.512 ACUTE PAIN OF LEFT SHOULDER: ICD-10-CM

## 2018-10-24 PROCEDURE — 97110 THERAPEUTIC EXERCISES: CPT | Mod: GP | Performed by: PHYSICAL THERAPIST

## 2018-10-24 PROCEDURE — 97140 MANUAL THERAPY 1/> REGIONS: CPT | Mod: GP | Performed by: PHYSICAL THERAPIST

## 2018-11-05 ENCOUNTER — THERAPY VISIT (OUTPATIENT)
Dept: PHYSICAL THERAPY | Facility: CLINIC | Age: 73
End: 2018-11-05
Payer: COMMERCIAL

## 2018-11-05 DIAGNOSIS — M25.512 ACUTE PAIN OF LEFT SHOULDER: ICD-10-CM

## 2018-11-05 PROCEDURE — 97140 MANUAL THERAPY 1/> REGIONS: CPT | Mod: GP | Performed by: PHYSICAL THERAPIST

## 2018-11-05 PROCEDURE — 97112 NEUROMUSCULAR REEDUCATION: CPT | Mod: GP | Performed by: PHYSICAL THERAPIST

## 2018-11-05 PROCEDURE — 97110 THERAPEUTIC EXERCISES: CPT | Mod: GP | Performed by: PHYSICAL THERAPIST

## 2018-11-12 ENCOUNTER — THERAPY VISIT (OUTPATIENT)
Dept: PHYSICAL THERAPY | Facility: CLINIC | Age: 73
End: 2018-11-12
Payer: COMMERCIAL

## 2018-11-12 DIAGNOSIS — M25.512 ACUTE PAIN OF LEFT SHOULDER: ICD-10-CM

## 2018-11-12 PROCEDURE — 97140 MANUAL THERAPY 1/> REGIONS: CPT | Mod: GP | Performed by: PHYSICAL THERAPIST

## 2018-11-12 PROCEDURE — 97110 THERAPEUTIC EXERCISES: CPT | Mod: GP | Performed by: PHYSICAL THERAPIST

## 2018-11-12 PROCEDURE — 97112 NEUROMUSCULAR REEDUCATION: CPT | Mod: GP | Performed by: PHYSICAL THERAPIST

## 2018-11-12 NOTE — PROGRESS NOTES
Subjective:  HPI                    Objective:  System    Physical Exam    General     ROS    Assessment/Plan:    PROGRESS  REPORT    Progress reporting period is from 10/1/2018 to 11/12/2018.       SUBJECTIVE  Subjective changes noted by patient:   I can put my hand behind the back further.  I have had increase motion over the last week, put still have pain once reaching certain spots.  The pain is in the front of the chest and shoulder, along the upper back and behind the shoulder.     Current Pain level: 3/10 (sharp pain in the front with movement).     Previous pain level was  6/10  .   Changes in function:  Patient has improved after she has lost some of her motion, but overall minimal changes with goals  Adverse reaction to treatment or activity: unknown, but patient has had episodes of increase in pain and loss of motion,    OBJECTIVE  Changes noted in objective findings:   flexion 120, abduction 90, behind the back to sacrum, motion limited by pain.  Pain is anterior, upper back and posterior capsule.  Tightness in pectoralis major, minor, UT, levator and posterior capsule.  Poor placement and recruitment of scapula with movement.  Strength IR 4/5, ER 4/5.  Tenderness with superior shoulder/bursa and possible instability.    ASSESSMENT/PLAN  Updated problem list and treatment plan: Diagnosis 1:  Left shoulder pain  Pain -  manual therapy, self management, education and home program  Decreased ROM/flexibility - manual therapy, therapeutic exercise, therapeutic activity and home program  Decreased strength - therapeutic exercise, therapeutic activities and home program  Impaired muscle performance - neuro re-education and home program  Decreased function - therapeutic activities and home program  STG/LTGs have been met or progress has been made towards goals:  Minimal changes noted.  Assessment of Progress: The patient's progress has slowed.  The patient has had set backs in their progress.  Self Management  Plans:  Patient has been instructed in a home treatment program.  Patient  has been instructed in self management of symptoms.      Recommendations:  This patient would benefit from further evaluation.  Patient to return to MD due to continual pain with movement and limitation in ROM due to pain.  Patient has had episodes of increase pain and decrease in motion without any specific cause.  Patient's motion has always improved from the episodes but has not improved overall.  Patient may benefit from further therapy once pain is under control.    Please refer to the daily flowsheet for treatment today, total treatment time and time spent performing 1:1 timed codes.

## 2018-11-12 NOTE — MR AVS SNAPSHOT
After Visit Summary   11/12/2018    Nicki Guerra    MRN: [id number]           Patient Information     Date Of Birth          1945        Visit Information        Provider Department      11/12/2018 9:40 AM Flor Ferro PT [facility] Physical Therapy        Today's Diagnoses     Acute pain of left shoulder           Follow-ups after your visit        Your next 10 appointments already scheduled     Nov 15, 2018  3:40 PM CST   [facility]    [address] [place] [address] [phone]           Bring a current list of meds and any records pertaining to this visit. For Physicals, please bring immunization records and any forms needing to be filled out. Please arrive 10 minutes early to complete paperwork.              Who to contact     If you have questions or need follow up information about today's clinic visit or your schedule please contact [facility]LETIC [facility] PHYSICAL [facility] directly at [phone].  Normal or non-critical lab and imaging results will be communicated to you by MyChart, letter or phone within 4 business days after the clinic has received the results. If you do not hear from us within 7 days, please contact the clinic through [employer] or phone. If you have a critical or abnormal lab result, we will notify you by phone as soon as possible.  Submit refill requests through [employer] or call your pharmacy and they will forward the refill request to us. Please allow 3 business days for your refill to be completed.          Additional Information About Your Visit        MyChart Information     [employer] gives you secure access to your electronic health record. If you see a primary care provider, you can also send messages to your care team and make appointments. If you have questions, please call your primary  care clinic.  If you do not have a primary care provider, please call [phone] and they will assist you.        Care EveryWhere ID     This is your Care EveryWhere ID. This could be used by other organizations to access your [place] medical records  [id number]         Blood Pressure from Last 3 Encounters:   08/13/18 128/72   07/17/17 104/58   03/23/17 118/66    Weight from Last 3 Encounters:   08/13/18 59.9 kg (132 lb)   07/17/17 57.9 kg (127 lb 9.6 oz)   03/23/17 58.1 kg (128 lb)              We Performed the Following     ODALYS PROGRESS NOTES REPORT     MANUAL THER TECH,1+REGIONS,EA 15 MIN     NEUROMUSCULAR RE-EDUCATION     THERAPEUTIC EXERCISES        Primary Care Provider Office Phone # Fax #    Melonie Lucia [phone] [address]  [facility] [address]        Equal Access to Services     FRANCIS [facility] : Hadii aad ku hadasho Soomaali, waaxda luqadaha, qaybta kaalmada adeegyada, waxay brittanyin hayelizan pj moreno . So [facility] [phone].    ATENCIÓN: Si shonala andreina, tiene a ortega disposición servicios gratuitos de asistencia lingüística. Llame al [phone].    We comply with applicable federal civil rights laws and [place] laws. We do not discriminate on the basis of race, color, national origin, age, disability, sex, sexual orientation, or gender identity.            Thank you!     Thank you for choosing [facility] PHYSICAL THERAPY  for your care. Our goal is always to provide you with excellent care. Hearing back from our patients is one way we can continue to improve our services. Please take a few minutes to complete the written survey that you may receive in the mail after your visit with us. Thank you!             Your Updated Medication List - Protect others around you: Learn how to safely use, store and throw away your medicines at www.disposemymeds.org.          This list is accurate as of 11/12/18  1:39 PM.  Always use  your most recent med list.                   Brand Name Dispense Instructions for use Diagnosis    CALCIUM + D PO      Take 600 mg by mouth    Routine general medical examination at a health care facility       FLUoxetine HCl (PMDD) 20 MG Caps      Take 1 capsule by mouth daily.        MULTIVITAMINS PO      1 daily    Routine general medical examination at a health care facility       vitamin C CR 1000 MG Tbcr      1 TABLET DAILY        WELLBUTRIN  MG 12 hr tablet   Generic drug:  buPROPion      Take 200 mg by mouth daily    Left shoulder pain, unspecified chronicity

## 2018-11-12 NOTE — LETTER
[facility] PHYSICAL THERAPY  [address]  [facility] [address]  [phone]    2018    Re: Nicki Guerra   :   1945  MRN:  [id number]   REFERRING PHYSICIAN:   Carola Gutierrez    [facility] PHYSICAL [facility]    Date of Initial Evaluation:  10/1/2018  Visits:  Rxs Used: 6  Reason for Referral:  Acute pain of left shoulder      PROGRESS  REPORT  Progress reporting period is from 10/1/2018 to 2018.       SUBJECTIVE  Subjective changes noted by patient: I can put my hand behind the back further.  I have had increased motion over the last week, but still have pain once reaching certain spots.  The pain is in the front of the chest and shoulder, along the upper back and behind the shoulder.    Current Pain level: 3/10 (sharp pain in the front with movement).     Previous pain level was 6/10.   Changes in function: Patient has improved after she has lost some of her motion, but overall minimal changes with goals  Adverse reaction to treatment or activity: unknown, but patient has had episodes of increase in pain and loss of motion    OBJECTIVE  Changes noted in objective findings: flexion 120, abduction 90, behind the back to sacrum, motion limited by pain.  Pain is anterior, upper back and posterior capsule.  Tightness in pectoralis major, minor, UT, levator and posterior capsule.  Poor placement and recruitment of scapula with movement.  Strength IR 4/5, ER 4/5.  Tenderness with superior shoulder/bursa and possible instability.    ASSESSMENT/PLAN  Updated problem list and treatment plan:   Diagnosis 1: Left shoulder pain    Pain - manual therapy, self management, education and home program  Decreased ROM/flexibility - manual therapy, therapeutic exercise, therapeutic activity and home program  Decreased strength - therapeutic exercise, therapeutic activities and home  program  Impaired muscle performance - neuro re-education and home program  Decreased function - therapeutic activities and home program  STG/LTGs have been met or progress has been made towards goals: Minimal changes noted.  Assessment of Progress: The patient's progress has slowed.    Re: Nicki Guerra   :   1945    The patient has had set backs in their progress.  Self Management Plans: Patient has been instructed in a home treatment program.  Patient  has been instructed in self management of symptoms.    Recommendations:  This patient would benefit from further evaluation.  Patient to return to MD due to continual pain with movement and limitation in ROM due to pain.  Patient has had episodes of increased pain and decrease in motion without any specific cause.  Patient's motion has always improved from the episodes but has not improved overall.  Patient may benefit from further therapy once pain is under control.          Thank you for your referral.    INQUIRIES  Therapist: Flor Ferro PT   [facility] [place]ICAL [facility]  [address] [address]  Phone: [phone]  Fax: [phone]

## 2018-11-15 ENCOUNTER — OFFICE VISIT (OUTPATIENT)
Dept: FAMILY MEDICINE | Facility: CLINIC | Age: 73
End: 2018-11-15
Payer: COMMERCIAL

## 2018-11-15 VITALS
DIASTOLIC BLOOD PRESSURE: 73 MMHG | TEMPERATURE: 98.2 F | HEART RATE: 65 BPM | SYSTOLIC BLOOD PRESSURE: 129 MMHG | RESPIRATION RATE: 16 BRPM | WEIGHT: 130 LBS | BODY MASS INDEX: 22.2 KG/M2 | OXYGEN SATURATION: 98 % | HEIGHT: 64 IN

## 2018-11-15 DIAGNOSIS — F33.1 MAJOR DEPRESSIVE DISORDER, RECURRENT EPISODE, MODERATE (H): ICD-10-CM

## 2018-11-15 DIAGNOSIS — M67.912 TENDINOPATHY OF LEFT ROTATOR CUFF: Primary | ICD-10-CM

## 2018-11-15 PROCEDURE — 99214 OFFICE O/P EST MOD 30 MIN: CPT | Performed by: FAMILY MEDICINE

## 2018-11-15 RX ORDER — BUPROPION HYDROCHLORIDE 150 MG/1
150 TABLET, FILM COATED, EXTENDED RELEASE ORAL EVERY MORNING
Refills: 0 | COMMUNITY
Start: 2018-10-16 | End: 2019-03-04

## 2018-11-15 NOTE — PATIENT INSTRUCTIONS
Complete MRI on left shoulder as soon as possible as planned. I will notify you of recommendations when I receive the results.    You can call [phone] to schedule this at the [facility] in [place] (formerly the [facility]).      Referral for orthopedics made. They will call to schedule appointment for you.  Schedule an appointment as planned and if not needed you may cancel the appointment.

## 2018-11-15 NOTE — MR AVS SNAPSHOT
After Visit Summary   11/15/2018    Nicki Guerra    MRN: [id number]           Patient Information     Date Of Birth          1945        Visit Information        Provider Department      11/15/2018 3:40 PM Melonie Lucia MD [facility]        Today's Diagnoses     Tendinopathy of left rotator cuff    -  1      Care Instructions    Complete MRI on left shoulder as soon as possible as planned. I will notify you of recommendations when I receive the results.    You can call [phone] to schedule this at the [facility] in [place] (formerly the [facility]).      Referral for orthopedics made. They will call to schedule appointment for you.  Schedule an appointment as planned and if not needed you may cancel the appointment.              Follow-ups after your visit        Additional Services     ORTHO  REFERRAL       [facility] is referring you to the Orthopedic  Services at [facility].       The  Representative will assist you in the coordination of your Orthopedic and Musculoskeletal Care as prescribed by your physician.    The  Representative will call you within 1 business day to help schedule your appointment, or you may contact the  Representative at:    All areas ~ [phone]     Type of Referral : Surgical / Specialist       Timeframe requested: 3 - 5 days    Coverage of these services is subject to the terms and limitations of your health insurance plan.  Please call member services at your health plan with any benefit or coverage questions.      If X-rays, CT or MRI's have been performed, please contact the facility where they were done to arrange for , prior to your scheduled appointment.  Please bring this referral request to your appointment and present it to your specialist.                  Future tests that were ordered for you  "today     Open Future Orders        Priority Expected Expires Ordered    MR Shoulder Left w/o Contrast Routine  11/15/2019 11/15/2018            Who to contact     If you have questions or need follow up information about today's clinic visit or your schedule please contact [facility] directly at [phone].  Normal or non-critical lab and imaging results will be communicated to you by MyChart, letter or phone within 4 business days after the clinic has received the results. If you do not hear from us within 7 days, please contact the clinic through [employer] or phone. If you have a critical or abnormal lab result, we will notify you by phone as soon as possible.  Submit refill requests through [employer] or call your pharmacy and they will forward the refill request to us. Please allow 3 business days for your refill to be completed.          Additional Information About Your Visit        MyChart Information     [employer] gives you secure access to your electronic health record. If you see a primary care provider, you can also send messages to your care team and make appointments. If you have questions, please call your primary care clinic.  If you do not have a primary care provider, please call [phone] and they will assist you.        Care EveryWhere ID     This is your Care EveryWhere ID. This could be used by other organizations to access your [facility] medical records  [id number]        Your Vitals Were     Pulse Temperature Respirations Height Pulse Oximetry BMI (Body Mass Index)    65 98.2  F (36.8  C) 16 1.632 m (5' 4.25\") 98% 22.14 kg/m2       Blood Pressure from Last 3 Encounters:   11/15/18 129/73   08/13/18 128/72   07/17/17 104/58    Weight from Last 3 Encounters:   11/15/18 59 kg (130 lb)   08/13/18 59.9 kg (132 lb)   07/17/17 57.9 kg (127 lb 9.6 oz)              We Performed the Following     ORTHO  REFERRAL        Primary Care Provider Office Phone # Fax #    Melonie CHILDS " MD Rea [phone]       [address]  [place] [address]        Equal Access to Services     CARMITA COATES : Hadii aad ku hadkevinluke Leyva, queda starcolletteha, jackta simeonamyda west, haroon cruz mariolabharti ferris laDoreennkechi morrow. So [facility] [phone].    ATENCIÓN: Si habla español, tiene a ortega disposición servicios gratuitos de asistencia lingüística. Llame al [phone].    We comply with applicable federal civil rights laws and [place] laws. We do not discriminate on the basis of race, color, national origin, age, disability, sex, sexual orientation, or gender identity.            Thank you!     Thank you for choosing [facility]  for your care. Our goal is always to provide you with excellent care. Hearing back from our patients is one way we can continue to improve our services. Please take a few minutes to complete the written survey that you may receive in the mail after your visit with us. Thank you!             Your Updated Medication List - Protect others around you: Learn how to safely use, store and throw away your medicines at www.disposemymeds.org.          This list is accurate as of 11/15/18  4:22 PM.  Always use your most recent med list.                   Brand Name Dispense Instructions for use Diagnosis    buPROPion 150 MG 12 hr tablet    ZYBAN     Take 150 mg by mouth every morning        CALCIUM + D PO      Take 600 mg by mouth    Routine general medical examination at a health care facility       FLUoxetine 20 MG capsule    PROzac     TAKE ONE CAPSULE BY MOUTH EVERY MORNING        MULTIVITAMINS PO      1 daily    Routine general medical examination at a health care facility       vitamin C CR 1000 MG Tbcr      1 TABLET DAILY

## 2018-11-15 NOTE — PROGRESS NOTES
"  SUBJECTIVE:   Nicki Guerra is a 73 year old female who presents to clinic today for the following health issues:      Joint Pain    Onset: 4 months now     Description:   Location: left shoulder  Character: Sharp    Intensity: moderate    Progression of Symptoms: worse    Accompanying Signs & Symptoms:  Other symptoms: weaker than than normal.     History:   Previous similar pain: no       Precipitating factors:   Trauma or overuse: YES- Very active, lifting things.     Alleviating factors:  Improved by: Ice    Therapies Tried and outcome: Aleve, Tylenol, ice - helps     Patient is right handed.     Has been seen in physical therapy for acute pain of the left shoulder and has found some improvement with physical therapy, but endorses of sharp pain that \"will not resolve\". Pain radiates down the arm, but denies numbness or tingling.  She stated that she feels as if her left shoulder is loose and at risk for dislocating. She is mindful when lifting with left hand and does not over lift, due to left shoulder feeling as if it will \"pop out\" and weakness.      Unknown cause of trauma, but reports that she was digging in the garden, and woke up one morning with left shoulder feeling stiff.      Still cannot sleep on left side of the body.   Also endorses that  strength of the left hand is not what it used to be prior to shoulder injury.     Mood - Depression  Patient follows Psychiatry and symptoms are better on the 150 mg daily Wellbutrin SR.  Was on 200 mg SR and was \"overstimulated\" on this.  Per patient depression symptoms has improved.       Problem list and histories reviewed & adjusted, as indicated.  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     Port wine stain on L posterior upper arm     Acute pain of left shoulder     Past Surgical History:   Procedure Laterality Date     HC DILATION/CURETTAGE DIAG/THER NON OB  1969       Social History   Substance Use Topics     Smoking status: Former Smoker     Smokeless tobacco: Never Used      Comment: quit 28 yrs ago     Alcohol use No     Family History   Problem Relation Age of Onset     Hypertension Father      Respiratory Father      EMPHYSEMA     Depression Father      Eye Disorder Father      GLAUCOMA, CATARACTS     GASTROINTESTINAL DISEASE Father      ULCER     Cardiovascular Father      HEART STENTS     C.A.D. Father      AGE 84, STENTS     Cancer Father      kidney     Breast Cancer Maternal Grandmother      Prostate Cancer Paternal Grandfather      Alzheimer Disease Mother      Arthritis Mother      Depression Mother      Psychotic Disorder Brother      SCHITZOPHRENIA     Breast Cancer Other      GASTROINTESTINAL DISEASE Brother      REFLUX     GASTROINTESTINAL DISEASE Brother      Bladder Cancer Brother      Alzheimer Disease Brother      early onset     Cancer - colorectal No family hx of      Thyroid Disease No family hx of          Current Outpatient Prescriptions   Medication Sig Dispense Refill     buPROPion (ZYBAN) 150 MG 12 hr tablet Take 150 mg by mouth every morning  0     CALCIUM + D OR Take 600 mg by mouth        FLUoxetine (PROZAC) 20 MG capsule TAKE ONE CAPSULE BY MOUTH EVERY MORNING  0     MULTIVITAMINS OR 1 daily       VITAMIN C CR 1000 MG OR TBCR 1 TABLET DAILY       Allergies   Allergen Reactions     Ibuprofen Swelling     Penicillins Hives     Recent Labs   Lab Test  03/23/17   0840   LDL  111*   HDL  64   TRIG  66   CR  0.71   GFRESTIMATED  82   GFRESTBLACK  >90   GFR Calc     POTASSIUM  4.2      BP Readings from Last 3 Encounters:   11/15/18 129/73   08/13/18 128/72   07/17/17 104/58     "Wt Readings from Last 3 Encounters:   11/15/18 59 kg (130 lb)   08/13/18 59.9 kg (132 lb)   07/17/17 57.9 kg (127 lb 9.6 oz)                  Labs reviewed in EPIC    Reviewed and updated as needed this visit by clinical staff  Tobacco  Allergies  Meds  Med Hx  Surg Hx  Fam Hx  Soc Hx      Reviewed and updated as needed this visit by Provider  Tobacco  Allergies  Meds  Med Hx  Surg Hx  Fam Hx  Soc Hx        ROS:  Constitutional, HEENT, cardiovascular, pulmonary, GI, , musculoskeletal, neuro, skin, endocrine and psych systems are negative, except as otherwise noted.    This document serves as a record of the services and decisions personally performed and made by Melonie Lucia MD. It was created on her behalf by Ian Curtis, a trained medical scribe. The creation of this document is based on the provider's statements to the medical scribe.  Ian Curtis November 15, 2018 4:06 PM      OBJECTIVE:     /73 (BP Location: Right arm, Patient Position: Sitting, Cuff Size: Adult Regular)  Pulse 65  Temp 98.2  F (36.8  C)  Resp 16  Ht 1.632 m (5' 4.25\")  Wt 59 kg (130 lb)  SpO2 98%  BMI 22.14 kg/m2  Body mass index is 22.14 kg/(m^2).  GENERAL: healthy, alert and no distress  EYES: Eyes grossly normal to inspection, PERRL and conjunctivae and sclerae normal  NECK: no adenopathy, no asymmetry, masses, or scars and thyroid normal to palpation  RESP: lungs clear to auscultation - no rales, rhonchi or wheezes  CV: regular rate and rhythm, normal S1 S2, no S3 or S4, no murmur, click or rub, no peripheral edema and peripheral pulses strong  ABDOMEN: soft, nontender, no hepatosplenomegaly, no masses and bowel sounds normal  MS: Left shoulder: limited abduction above the horizontal, and pain on internal and external rotation. FROM at elbow and wrist. No swelling.  Tenderness to palpation over the supraspinatus.  Right shoulder normal - FROM, non-tender.   SKIN: no suspicious lesions or rashes  NEURO: " "Normal strength and tone, mentation intact and speech normal  PSYCH: mentation appears normal, affect normal/bright    Diagnostic Test Results:  none     ASSESSMENT/PLAN:         BP Screening:   Last 3 BP Readings:    BP Readings from Last 3 Encounters:   11/15/18 129/73   08/13/18 128/72   07/17/17 104/58       The following was recommended to the patient:  Re-screen BP within a year and recommended lifestyle modifications  Tobacco Cessation:   reports that she has quit smoking. She has never used smokeless tobacco.      BMI:   Estimated body mass index is 22.14 kg/(m^2) as calculated from the following:    Height as of this encounter: 1.632 m (5' 4.25\").    Weight as of this encounter: 59 kg (130 lb).         1. Tendinopathy of left rotator cuff  MRI recommended.  Has done PHYSICAL THERAPY with some improvement in ROM but not able to do normal activities and having daily pain symptoms. Referral to ortho made for scheduling purposes.    - MR Shoulder Left w/o Contrast; Future  - ORTHO  REFERRAL      2. Major depressive disorder, recurrent episode, moderate (H)  Management with psych.  Reviewed symptoms.  Reports doing better with current medication.  Fluoxetine and wellbutrin combo.        Patient instructions discussed with patient  Patient Instructions   Complete MRI on left shoulder as soon as possible as planned. I will notify you of recommendations when I receive the results.    You can call [phone] to schedule this at the [facility] in [place] (formerly the [facility]).      Referral for orthopedics made. They will call to schedule appointment for you.  Schedule an appointment as planned and if not needed you may cancel the appointment.          The information in this document, created by the medical scribe for me, accurately reflects the services I personally performed and the decisions made by me. I have reviewed and approved this document for " accuracy prior to leaving the patient care area.  November 15, 2018 5:30 PM      Melonie Lucia MD  [facility]

## 2018-11-16 ENCOUNTER — RADIANT APPOINTMENT (OUTPATIENT)
Dept: MRI IMAGING | Facility: CLINIC | Age: 73
End: 2018-11-16
Attending: FAMILY MEDICINE
Payer: COMMERCIAL

## 2018-11-16 DIAGNOSIS — M67.912 TENDINOPATHY OF LEFT ROTATOR CUFF: ICD-10-CM

## 2018-11-16 PROCEDURE — 73221 MRI JOINT UPR EXTREM W/O DYE: CPT | Mod: LT | Performed by: RADIOLOGY

## 2018-11-17 ENCOUNTER — MYC MEDICAL ADVICE (OUTPATIENT)
Dept: FAMILY MEDICINE | Facility: CLINIC | Age: 73
End: 2018-11-17

## 2018-11-20 ENCOUNTER — OFFICE VISIT (OUTPATIENT)
Dept: ORTHOPEDICS | Facility: CLINIC | Age: 73
End: 2018-11-20
Payer: COMMERCIAL

## 2018-11-20 VITALS
HEIGHT: 65 IN | DIASTOLIC BLOOD PRESSURE: 87 MMHG | BODY MASS INDEX: 21.33 KG/M2 | SYSTOLIC BLOOD PRESSURE: 144 MMHG | WEIGHT: 128 LBS

## 2018-11-20 DIAGNOSIS — M75.42 IMPINGEMENT SYNDROME, SHOULDER, LEFT: Primary | ICD-10-CM

## 2018-11-20 DIAGNOSIS — M19.012 ARTHROSIS OF LEFT ACROMIOCLAVICULAR JOINT: ICD-10-CM

## 2018-11-20 DIAGNOSIS — M75.122 COMPLETE TEAR OF LEFT ROTATOR CUFF: ICD-10-CM

## 2018-11-20 DIAGNOSIS — M75.22 BICEPS TENDONITIS, LEFT: ICD-10-CM

## 2018-11-20 PROCEDURE — 99204 OFFICE O/P NEW MOD 45 MIN: CPT | Performed by: ORTHOPAEDIC SURGERY

## 2018-11-20 NOTE — LETTER
11/20/2018         RE: Nicki Guerra  [address]  [place] [address]        Dear Colleague,    Thank you for referring your patient, Nicki Guerra, to the [facility]. Please see a copy of my visit note below.    Nicki Guerra is a 73 year old female who is seen in consultation at the request of Dr Melonie Lucia for left shoulder pain.    Past Medical History:   Diagnosis Date     Allergic rhinitis, cause unspecified      Depressive disorder, not elsewhere classified      Enthesopathy of hip region      Family history of psychiatric condition      Head injury, unspecified      Hypotension, unspecified      Lateral epicondylitis  of elbow      Premenopausal menorrhagia      Sciatica        Past Surgical History:   Procedure Laterality Date     HC DILATION/CURETTAGE DIAG/THER NON OB  1969       Family History   Problem Relation Age of Onset     Hypertension Father      Respiratory Father      EMPHYSEMA     Depression Father      Eye Disorder Father      GLAUCOMA, CATARACTS     GASTROINTESTINAL DISEASE Father      ULCER     Cardiovascular Father      HEART STENTS     C.A.D. Father      AGE 84, STENTS     Cancer Father      kidney     Breast Cancer Maternal Grandmother      Prostate Cancer Paternal Grandfather      Alzheimer Disease Mother      Arthritis Mother      Depression Mother      Psychotic Disorder Brother      SCHITZOPHRENIA     Breast Cancer Other      GASTROINTESTINAL DISEASE Brother      REFLUX     GASTROINTESTINAL DISEASE Brother      Bladder Cancer Brother      Alzheimer Disease Brother      early onset     Cancer - colorectal No family hx of      Thyroid Disease No family hx of        Social History     Social History     Marital status:      Spouse name: N/A     Number of children: N/A     Years of education: N/A     Occupational History     [employer]      [employer]     working in assembly      Social History Main  Topics     Smoking status: Former Smoker     Smokeless tobacco: Never Used      Comment: quit 28 yrs ago     Alcohol use No     Drug use: No     Sexual activity: Yes     Partners: Male     Other Topics Concern      Service No     Blood Transfusions No     Caffeine Concern No     Occupational Exposure No     Hobby Hazards No     Sleep Concern Yes     Stress Concern Yes     Weight Concern No     Special Diet No     Back Care Yes     Exercise Yes     1-2 x per week     Bike Helmet No     Seat Belt Yes     Self-Exams No     Parent/Sibling W/ Cabg, Mi Or Angioplasty Before 65f 55m? No     Social History Narrative    Remarried in 2004.  Does do Critical access hospitalWebSafety work       Current Outpatient Prescriptions   Medication Sig Dispense Refill     buPROPion (ZYBAN) 150 MG 12 hr tablet Take 150 mg by mouth every morning  0     CALCIUM + D OR Take 600 mg by mouth        FLUoxetine (PROZAC) 20 MG capsule TAKE ONE CAPSULE BY MOUTH EVERY MORNING  0     MULTIVITAMINS OR 1 daily       VITAMIN C CR 1000 MG OR TBCR 1 TABLET DAILY         Allergies   Allergen Reactions     Ibuprofen Swelling     Penicillins Hives       REVIEW OF SYSTEMS:  CONSTITUTIONAL:  NEGATIVE for fever, chills, change in weight, not feeling tired  SKIN:  NEGATIVE for worrisome rashes, no skin lumps, no skin ulcers and no non-healing wounds  EYES:  NEGATIVE for vision changes or irritation.  ENT/MOUTH:  NEGATIVE.  No hearing loss, no hoarseness, no difficulty swallowing.  RESP:  NEGATIVE. No cough or shortness of breath.  CV:  NEGATIVE for chest pain, palpitations or peripheral edema  GI:  NEGATIVE for nausea, abdominal pain, heartburn, or change in bowel habits  :  Negative. No dysuria, no hematuria  MUSCULOSKELETAL:  See HPI above  NEURO:  NEGATIVE . No headaches, no dizziness,  no numbness  ENDOCRINE:  NEGATIVE for temperature intolerance, skin/hair changes  HEME/ALLERGY/IMMUNE:  NEGATIVE for bleeding problems  PSYCHIATRIC:  NEGATIVE. no anxiety, no depression.  "    Exam:  Vitals: /87 (BP Location: Left arm)  Ht 1.651 m (5' 5\")  Wt 58.1 kg (128 lb)  BMI 21.3 kg/m2  BMI= Body mass index is 21.3 kg/(m^2).  Constitutional:  healthy, alert and no distress  Neuro: Alert and Oriented x 3, Sensation grossly WNL.  Psych: Affect normal   Respiratory: Breathing not labored.  Cardiovascular: normal peripheral pulses  Lymph: no adenopathy  Skin: No rashes,worrisome lesions or skin problems      Again, thank you for allowing me to participate in the care of your patient.        Sincerely,        Dinesh Hermosillo MD    "

## 2018-11-20 NOTE — MR AVS SNAPSHOT
After Visit Summary   11/20/2018    Nicki Guerra    MRN: [id number]           Patient Information     Date Of Birth          1945        Visit Information        Provider Department      11/20/2018 2:00 PM Dinesh Hermosillo MD [facility]        Care Instructions    Rotator cuff repair is done in same day surgery.    Preop physical with primary physician is needed within 30 days of the surgery.  Nothing to eat or drink for 8 hours before surgery.  For same day surgery you need a ride.  Someone should stay with you for 12 hours afterward.  Stop blood thinners 5 days before surgery (aspirin, warfarin, anti-inflammatories).      General anesthesia is used.  They often perform a pain block at the neck to help with pain.  Sutures are in the wound.  Keep wound dry until clinic appointment at 1 1/2 weeks.     Physical Therapy will start after first clinic visit.  0-3 weeks.  Arm in sling or immobilizer.  No reaching with operative arm.   Okay to have arm out to dangle or bend elbow.    3-6 weeks.  Discontinue immobilizer.  Start reaching.  No lifting over 1 lb.  6-12 weeks  Work on strengthening.  1 year to full recovery.    Call [phone] to schedule your surgery.          Follow-ups after your visit        Who to contact     If you have questions or need follow up information about today's clinic visit or your schedule please contact [facility] directly at [phone].  Normal or non-critical lab and imaging results will be communicated to you by MyChart, letter or phone within 4 business days after the clinic has received the results. If you do not hear from us within 7 days, please contact the clinic through [employer] or phone. If you have a critical or abnormal lab result, we will notify you by phone as soon as possible.  Submit refill requests through [employer] or call your pharmacy and they will forward the refill request to us. Please allow 3  "business days for your refill to be completed.          Additional Information About Your Visit        MyChart Information     [employer] gives you secure access to your electronic health record. If you see a primary care provider, you can also send messages to your care team and make appointments. If you have questions, please call your primary care clinic.  If you do not have a primary care provider, please call [phone] and they will assist you.        Care EveryWhere ID     This is your Care EveryWhere ID. This could be used by other organizations to access your [place] medical records  [id number]        Your Vitals Were     Height BMI (Body Mass Index)                1.651 m (5' 5\") 21.3 kg/m2           Blood Pressure from Last 3 Encounters:   11/20/18 144/87   11/15/18 129/73   08/13/18 128/72    Weight from Last 3 Encounters:   11/20/18 58.1 kg (128 lb)   11/15/18 59 kg (130 lb)   08/13/18 59.9 kg (132 lb)              Today, you had the following     No orders found for display       Primary Care Provider Office Phone # Fax #    Melonie Lucia [phone] [address]  [place] [address]        Equal Access to Services     CARMITA COATES : Hadii aad ku hadasho Soomaali, waaxda luqadaha, qaybta kaalmada adeegyada, waxay idiin hayelizan pj efrris lachristiane morrow. So [facility] [phone].    ATENCIÓN: Si habla español, tiene a ortega disposición servicios gratuitos de asistencia lingüística. Llame al [phone].    We comply with applicable federal civil rights laws and [place] laws. We do not discriminate on the basis of race, color, national origin, age, disability, sex, sexual orientation, or gender identity.            Thank you!     Thank you for choosing [facility]  for your care. Our goal is always to provide you with excellent care. Hearing back from our patients is one way we can continue to improve our services. Please take a few minutes to complete the " written survey that you may receive in the mail after your visit with us. Thank you!             Your Updated Medication List - Protect others around you: Learn how to safely use, store and throw away your medicines at www.disposemymeds.org.          This list is accurate as of 11/20/18  2:25 PM.  Always use your most recent med list.                   Brand Name Dispense Instructions for use Diagnosis    buPROPion 150 MG 12 hr tablet    ZYBAN     Take 150 mg by mouth every morning        CALCIUM + D PO      Take 600 mg by mouth    Routine general medical examination at a health care facility       FLUoxetine 20 MG capsule    PROzac     TAKE ONE CAPSULE BY MOUTH EVERY MORNING        MULTIVITAMINS PO      1 daily    Routine general medical examination at a health care facility       vitamin C CR 1000 MG Tbcr      1 TABLET DAILY

## 2018-11-20 NOTE — PATIENT INSTRUCTIONS
Rotator cuff repair is done in same day surgery.    Preop physical with primary physician is needed within 30 days of the surgery.  Nothing to eat or drink for 8 hours before surgery.  For same day surgery you need a ride.  Someone should stay with you for 12 hours afterward.  Stop blood thinners 5 days before surgery (aspirin, warfarin, anti-inflammatories).      General anesthesia is used.  They often perform a pain block at the neck to help with pain.  Sutures are in the wound.  Keep wound dry until clinic appointment at 1 1/2 weeks.     Physical Therapy will start after first clinic visit.  0-3 weeks.  Arm in sling or immobilizer.  No reaching with operative arm.   Okay to have arm out to dangle or bend elbow.    3-6 weeks.  Discontinue immobilizer.  Start reaching.  No lifting over 1 lb.  6-12 weeks  Work on strengthening.  1 year to full recovery.    Call [phone] to schedule your surgery.

## 2018-11-21 NOTE — PROGRESS NOTES
Visit Date:   11/20/2018      Ms. Jeffrey is a 73-year-old female seen in consultation at the request of Dr. Melonie Lucia for evaluation of left shoulder pain.  This started in late Summer 2018 without definite history of injury.  She has noted pain with reaching and lifting, has had sharp aching constant pain, rated 5-6 out of 10.  She has had pain mainly in the anterior shoulder.  Physical therapy has not helped with strengthening and massage.  She had MRI scan showing a complete tear of the supraspinatus tendon, a moderately frayed partial tear of the subscapularis.  There were degenerative changes of the AC joint and a type 2 acromion.  The muscle bulk of the rotator cuff appeared well preserved without fatty atrophy.  Long head of the biceps they felt was medialized, although on my review, I am not seeing this.  They did note a partial thickness tear of the biceps tendon.  They also noted some tearing of the superior labrum.      PHYSICAL EXAMINATION:  Shows full motion of the shoulders.  She does have pain with resisted internal rotation, a bit more with resisted external rotation and abduction.  She has no significant tenderness of the AC joint or subacromial space.  There is tenderness anteriorly at the shoulder near the subscapularis.  It is really medial to the biceps tendon.  There is no increased warmth and erythema.  Sensation and circulation are intact.      IMPRESSION:  Left rotator cuff tear involving the supraspinatus and subscapularis, acromioclavicular arthrosis with impingement and partial thickness biceps tendon tear.  We have discussed options.  She wants to be very active with activities and, after discussion of options, we elected to proceed with a rotator cuff repair, acromioplasty, clavicle excision and probable biceps tenodesis.  Risks and benefits were discussed today.         GARY CHAND MD             D: 11/20/2018   T: 11/21/2018   MT: LEXI      Name:     EMI JEFFREY   MRN:       [id number]        Account:      [id number]   :      1945           Visit Date:   2018      Document: [id number]

## 2018-11-21 NOTE — PROGRESS NOTES
Nicki Guerra is a 73 year old female who is seen in consultation at the request of Dr Melonie Lucia for left shoulder pain.    Past Medical History:   Diagnosis Date     Allergic rhinitis, cause unspecified      Depressive disorder, not elsewhere classified      Enthesopathy of hip region      Family history of psychiatric condition      Head injury, unspecified      Hypotension, unspecified      Lateral epicondylitis  of elbow      Premenopausal menorrhagia      Sciatica        Past Surgical History:   Procedure Laterality Date     HC DILATION/CURETTAGE DIAG/THER NON OB  1969       Family History   Problem Relation Age of Onset     Hypertension Father      Respiratory Father      EMPHYSEMA     Depression Father      Eye Disorder Father      GLAUCOMA, CATARACTS     GASTROINTESTINAL DISEASE Father      ULCER     Cardiovascular Father      HEART STENTS     C.A.D. Father      AGE 84, STENTS     Cancer Father      kidney     Breast Cancer Maternal Grandmother      Prostate Cancer Paternal Grandfather      Alzheimer Disease Mother      Arthritis Mother      Depression Mother      Psychotic Disorder Brother      SCHITZOPHRENIA     Breast Cancer Other      GASTROINTESTINAL DISEASE Brother      REFLUX     GASTROINTESTINAL DISEASE Brother      Bladder Cancer Brother      Alzheimer Disease Brother      early onset     Cancer - colorectal No family hx of      Thyroid Disease No family hx of        Social History     Social History     Marital status:      Spouse name: N/A     Number of children: N/A     Years of education: N/A     Occupational History     [employer]      [employer]     working in [employer]      Social History Main Topics     Smoking status: Former Smoker     Smokeless tobacco: Never Used      Comment: quit 28 yrs ago     Alcohol use No     Drug use: No     Sexual activity: Yes     Partners: Male     Other Topics Concern      Service No     Blood Transfusions No      "Caffeine Concern No     Occupational Exposure No     Hobby Hazards No     Sleep Concern Yes     Stress Concern Yes     Weight Concern No     Special Diet No     Back Care Yes     Exercise Yes     1-2 x per week     Bike Helmet No     Seat Belt Yes     Self-Exams No     Parent/Sibling W/ Cabg, Mi Or Angioplasty Before 65f 55m? No     Social History Narrative    Remarried in 2004.  Does do [employer] work       Current Outpatient Prescriptions   Medication Sig Dispense Refill     buPROPion (ZYBAN) 150 MG 12 hr tablet Take 150 mg by mouth every morning  0     CALCIUM + D OR Take 600 mg by mouth        FLUoxetine (PROZAC) 20 MG capsule TAKE ONE CAPSULE BY MOUTH EVERY MORNING  0     MULTIVITAMINS OR 1 daily       VITAMIN C CR 1000 MG OR TBCR 1 TABLET DAILY         Allergies   Allergen Reactions     Ibuprofen Swelling     Penicillins Hives       REVIEW OF SYSTEMS:  CONSTITUTIONAL:  NEGATIVE for fever, chills, change in weight, not feeling tired  SKIN:  NEGATIVE for worrisome rashes, no skin lumps, no skin ulcers and no non-healing wounds  EYES:  NEGATIVE for vision changes or irritation.  ENT/MOUTH:  NEGATIVE.  No hearing loss, no hoarseness, no difficulty swallowing.  RESP:  NEGATIVE. No cough or shortness of breath.  CV:  NEGATIVE for chest pain, palpitations or peripheral edema  GI:  NEGATIVE for nausea, abdominal pain, heartburn, or change in bowel habits  :  Negative. No dysuria, no hematuria  MUSCULOSKELETAL:  See HPI above  NEURO:  NEGATIVE . No headaches, no dizziness,  no numbness  ENDOCRINE:  NEGATIVE for temperature intolerance, skin/hair changes  HEME/ALLERGY/IMMUNE:  NEGATIVE for bleeding problems  PSYCHIATRIC:  NEGATIVE. no anxiety, no depression.     Exam:  Vitals: /87 (BP Location: Left arm)  Ht 1.651 m (5' 5\")  Wt 58.1 kg (128 lb)  BMI 21.3 kg/m2  BMI= Body mass index is 21.3 kg/(m^2).  Constitutional:  healthy, alert and no distress  Neuro: Alert and Oriented x 3, Sensation grossly " WNL.  Psych: Affect normal   Respiratory: Breathing not labored.  Cardiovascular: normal peripheral pulses  Lymph: no adenopathy  Skin: No rashes,worrisome lesions or skin problems

## 2018-12-10 ENCOUNTER — ANCILLARY PROCEDURE (OUTPATIENT)
Dept: GENERAL RADIOLOGY | Facility: CLINIC | Age: 73
End: 2018-12-10
Attending: FAMILY MEDICINE
Payer: COMMERCIAL

## 2018-12-10 ENCOUNTER — OFFICE VISIT (OUTPATIENT)
Dept: FAMILY MEDICINE | Facility: CLINIC | Age: 73
End: 2018-12-10
Payer: COMMERCIAL

## 2018-12-10 VITALS
HEART RATE: 60 BPM | BODY MASS INDEX: 21.66 KG/M2 | SYSTOLIC BLOOD PRESSURE: 102 MMHG | DIASTOLIC BLOOD PRESSURE: 70 MMHG | OXYGEN SATURATION: 98 % | TEMPERATURE: 98 F | HEIGHT: 65 IN | WEIGHT: 130 LBS | RESPIRATION RATE: 16 BRPM

## 2018-12-10 DIAGNOSIS — M79.672 LEFT FOOT PAIN: ICD-10-CM

## 2018-12-10 DIAGNOSIS — Z01.818 PREOP GENERAL PHYSICAL EXAM: Primary | ICD-10-CM

## 2018-12-10 DIAGNOSIS — F32.1 MODERATE MAJOR DEPRESSION (H): ICD-10-CM

## 2018-12-10 DIAGNOSIS — M75.122 COMPLETE ROTATOR CUFF TEAR OF LEFT SHOULDER: ICD-10-CM

## 2018-12-10 LAB
ALBUMIN SERPL-MCNC: 3.5 G/DL (ref 3.4–5)
ALP SERPL-CCNC: 70 U/L (ref 40–150)
ALT SERPL W P-5'-P-CCNC: 20 U/L (ref 0–50)
ANION GAP SERPL CALCULATED.3IONS-SCNC: 6 MMOL/L (ref 3–14)
AST SERPL W P-5'-P-CCNC: 19 U/L (ref 0–45)
BILIRUB SERPL-MCNC: 0.3 MG/DL (ref 0.2–1.3)
BUN SERPL-MCNC: 7 MG/DL (ref 7–30)
CALCIUM SERPL-MCNC: 8.4 MG/DL (ref 8.5–10.1)
CHLORIDE SERPL-SCNC: 103 MMOL/L (ref 94–109)
CO2 SERPL-SCNC: 28 MMOL/L (ref 20–32)
CREAT SERPL-MCNC: 0.68 MG/DL (ref 0.52–1.04)
ERYTHROCYTE [DISTWIDTH] IN BLOOD BY AUTOMATED COUNT: 13 % (ref 10–15)
GFR SERPL CREATININE-BSD FRML MDRD: 86 ML/MIN/1.7M2
GLUCOSE SERPL-MCNC: 105 MG/DL (ref 70–99)
HCT VFR BLD AUTO: 37.8 % (ref 35–47)
HGB BLD-MCNC: 12.7 G/DL (ref 11.7–15.7)
MCH RBC QN AUTO: 31.4 PG (ref 26.5–33)
MCHC RBC AUTO-ENTMCNC: 33.6 G/DL (ref 31.5–36.5)
MCV RBC AUTO: 94 FL (ref 78–100)
PLATELET # BLD AUTO: 217 10E9/L (ref 150–450)
POTASSIUM SERPL-SCNC: 4.2 MMOL/L (ref 3.4–5.3)
PROT SERPL-MCNC: 6.9 G/DL (ref 6.8–8.8)
RBC # BLD AUTO: 4.04 10E12/L (ref 3.8–5.2)
SODIUM SERPL-SCNC: 137 MMOL/L (ref 133–144)
WBC # BLD AUTO: 6.6 10E9/L (ref 4–11)

## 2018-12-10 PROCEDURE — 93000 ELECTROCARDIOGRAM COMPLETE: CPT | Performed by: FAMILY MEDICINE

## 2018-12-10 PROCEDURE — 36415 COLL VENOUS BLD VENIPUNCTURE: CPT | Performed by: FAMILY MEDICINE

## 2018-12-10 PROCEDURE — 99215 OFFICE O/P EST HI 40 MIN: CPT | Performed by: FAMILY MEDICINE

## 2018-12-10 PROCEDURE — 73630 X-RAY EXAM OF FOOT: CPT | Mod: LT

## 2018-12-10 PROCEDURE — 85027 COMPLETE CBC AUTOMATED: CPT | Performed by: FAMILY MEDICINE

## 2018-12-10 PROCEDURE — 80053 COMPREHEN METABOLIC PANEL: CPT | Performed by: FAMILY MEDICINE

## 2018-12-10 ASSESSMENT — MIFFLIN-ST. JEOR: SCORE: 1105.56

## 2018-12-10 NOTE — PATIENT INSTRUCTIONS
Proceed with surgery as planned.    Okay to take medications on the AM of surgery with a small sip of water.  You can use Tylenol up until the time of your surgery.     I have ordered labs for you. I will notify you with results.     Hard sole shoes will help with your pain.  Avoid rocking motion with foot.   Elevate your foot when seated and ice as well.     Before Your Surgery      Call your surgeon if there is any change in your health. This includes signs of a cold or flu (such as a sore throat, runny nose, cough, rash or fever).    Do not smoke, drink alcohol or take over the counter medicine (unless your surgeon or primary care doctor tells you to) for the 24 hours before and after surgery.    If you take prescribed drugs: Follow your doctor s orders about which medicines to take and which to stop until after surgery.    Eating and drinking prior to surgery: follow the instructions from your surgeon    Take a shower or bath the night before surgery. Use the soap your surgeon gave you to gently clean your skin. If you do not have soap from your surgeon, use your regular soap. Do not shave or scrub the surgery site.  Wear clean pajamas and have clean sheets on your bed.

## 2018-12-10 NOTE — PROGRESS NOTES
[address] [address]  [phone]  Dept: [phone]    PRE-OP EVALUATION:  Today's date: 12/10/2018    Nicki Guerra (: 1945) presents for pre-operative evaluation assessment as requested by Dinesh Tariq,.  She requires evaluation and anesthesia risk assessment prior to undergoing surgery/procedure for treatment of  Left rotator cuff tear .    Proposed Surgery/ Procedure: LEFT ROTATOR CUFF REPAIR WITH DISTAL CLAVICLE EXCISION, ACROMIOPLASTY AND POSSIBLE BICEPS TENODESIS   Date of Surgery/ Procedure: 18  Time of Surgery/ Procedure: 9:15AM  Hospital/Surgical Facility[facility]  Fax number for surgical facility: not needed  Primary Physician: Melonie Lucia  Type of Anesthesia Anticipated: to be determined    Patient has a Health Care Directive or Living Will:  YES     1. NO - Do you have a history of heart attack, stroke, stent, bypass or surgery on an artery in the head, neck, heart or legs?  2. NO - Do you ever have any pain or discomfort in your chest?  3. NO - Do you have a history of  Heart Failure?  4. NO - Are you troubled by shortness of breath when: walking on the level, up a slight hill or at night?  5. NO - Do you currently have a cold, bronchitis or other respiratory infection?  6. NO - Do you have a cough, shortness of breath or wheezing?  7. NO - Do you sometimes get pains in the calves of your legs when you walk?  8. NO - Do you or anyone in your family have previous history of blood clots?  9. NO - Do you or does anyone in your family have a serious bleeding problem such as prolonged bleeding following surgeries or cuts?  10. YES - HAVE YOU EVER HAD PROBLEMS WITH ANEMIA OR BEEN TOLD TO TAKE IRON PILLS? Distant past  11. NO - Have you had any abnormal blood loss such as black, tarry or bloody stools, or abnormal vaginal bleeding?  12. NO - Have you ever had a blood transfusion?  13. NO - Have you or any of  your relatives ever had problems with anesthesia?  14. NO - Do you have sleep apnea, excessive snoring or daytime drowsiness?  15. NO - Do you have any prosthetic heart valves?  16. NO - Do you have prosthetic joints?  17. NO - Is there any chance that you may be pregnant?      HPI:     HPI related to upcoming procedure:   73 year old with left rotator cuff injury that has failed conservative therapy with plans for surgical repair.  She is having daily pain with activity that limits the functional ability of left arm.       See problem list for active medical problems.  Problems all longstanding and stable, except as noted/documented.  See ROS for pertinent symptoms related to these conditions.                                                                                                                                                            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 8/30/2012   Received outside advance directive.  Previously signed by patient and witnessed with two signatures (cannot be the HCA).  scanned into EMR as Advance Directive/Living Will document. View document and details in Code Status History Report. Please see advance directive for  specifics.   Advance Directive received and scanned. Click on Code in the patient header to view. 9/13/2012   Myrna Lam MA               Anxiety 05/16/2012     Priority: Medium     CARDIOVASCULAR SCREENING; LDL GOAL LESS THAN 160 10/31/2010     Priority: Medium     Osteoporosis 01/06/2009     Priority: Medium     Moderate major depression (H) 01/06/2009     Priority: Medium     Sciatica      Priority: Medium     Lateral epicondylitis      Priority: Medium     Problem list name updated by automated process. Provider to review       Enthesopathy of hip region      Priority: Medium      Past Medical History:   Diagnosis Date     Allergic rhinitis, cause unspecified      Depressive disorder, not elsewhere classified      Enthesopathy of hip region      Family history of psychiatric condition      Head injury, unspecified      Hypotension, unspecified      Lateral epicondylitis  of elbow      Premenopausal menorrhagia      Sciatica      Past Surgical History:   Procedure Laterality Date     HC DILATION/CURETTAGE DIAG/THER NON OB  1969     Current Outpatient Medications   Medication Sig Dispense Refill     buPROPion (ZYBAN) 150 MG 12 hr tablet Take 150 mg by mouth every morning  0     CALCIUM + D OR Take 600 mg by mouth        FLUoxetine (PROZAC) 20 MG capsule TAKE ONE CAPSULE BY MOUTH EVERY MORNING  0     MULTIVITAMINS OR 1 daily       VITAMIN C CR 1000 MG OR TBCR 1 TABLET DAILY       OTC products: None, except as noted above    Allergies   Allergen Reactions     Ibuprofen Swelling     Penicillins Hives      Latex Allergy: NO    Social History     Tobacco Use     Smoking status: Former Smoker     Smokeless tobacco: Never Used     Tobacco comment: quit 28 yrs ago   Substance Use Topics     Alcohol use: No     History   Drug Use No     REVIEW OF SYSTEMS:   CONSTITUTIONAL: NEGATIVE for fever, chills, change in weight  INTEGUMENTARY/SKIN: NEGATIVE for worrisome rashes, moles or lesions  EYES: NEGATIVE for vision  "changes or irritation  ENT/MOUTH: NEGATIVE for ear, mouth and throat problems  RESP: NEGATIVE for significant cough or SOB  BREAST: NEGATIVE for masses, tenderness or discharge  CV: NEGATIVE for chest pain, palpitations or peripheral edema  GI: NEGATIVE for nausea, abdominal pain, heartburn, or change in bowel habits  : NEGATIVE for frequency, dysuria, or hematuria  MUSCULOSKELETAL: NEGATIVE for significant arthralgias or myalgia; POSITIVE for left foot pain  - The patient notes that she dropped a table on her left foot Saturday. She notes that she iced it, but it is still a little swollen and hurts. The patient notes that she is avoiding stepping on her foot, since the pressure will hurt her.  Left shoulder pain as above  She notes that her whole body is aching.   NEURO: NEGATIVE for weakness, dizziness or paresthesias  ENDOCRINE: NEGATIVE for temperature intolerance, skin/hair changes  HEME: NEGATIVE for bleeding problems  PSYCHIATRIC: NEGATIVE for changes in mood or affect    This document serves as a record of the services and decisions personally performed and made by Melonie Lucia MD. It was created on her behalf by Kiara Nathan, a trained medical scribe. The creation of this document is based the provider's statements to the medical scribe.    Kiara Nathan December 10, 2018 11:00 AM  EXAM:   /70   Pulse 60   Temp 98  F (36.7  C)   Resp 16   Ht 1.651 m (5' 5\")   Wt 59 kg (130 lb)   SpO2 98%   BMI 21.63 kg/m      GENERAL APPEARANCE: healthy, alert and no distress     EYES: EOMI, PERRL     HENT: ear canals and TM's normal and nose and mouth without ulcers or lesions     NECK: no adenopathy, no asymmetry, masses, or scars and thyroid normal to palpation     RESP: lungs clear to auscultation - no rales, rhonchi or wheezes     CV: regular rates and rhythm, normal S1 S2, no S3 or S4 and no murmur, click or rub     ABDOMEN:  soft, nontender, no HSM or masses and bowel sounds normal     MS: " extremities  LUE with pain on abduction and internal and external rotation at the shoulder.    Left foot with pain over the first metatarsal area, mild swelling no ecchymosis or erythema noted.     SKIN: no suspicious lesions or rashes     NEURO: Normal strength and tone, sensory exam grossly normal, mentation intact and speech normal     PSYCH: mentation appears normal. and affect normal/bright     LYMPHATICS: No cervical adenopathy    DIAGNOSTICS:     EKG: appears normal, NSR, normal axis, normal intervals, no acute ST/T changes c/w ischemia, no LVH by voltage criteria, unchanged from previous tracings  Labs Resulted Today:   Results for orders placed or performed in visit on 12/10/18   CBC with platelets   Result Value Ref Range    WBC 6.6 4.0 - 11.0 10e9/L    RBC Count 4.04 3.8 - 5.2 10e12/L    Hemoglobin 12.7 11.7 - 15.7 g/dL    Hematocrit 37.8 35.0 - 47.0 %    MCV 94 78 - 100 fl    MCH 31.4 26.5 - 33.0 pg    MCHC 33.6 31.5 - 36.5 g/dL    RDW 13.0 10.0 - 15.0 %    Platelet Count 217 150 - 450 10e9/L   Comprehensive metabolic panel   Result Value Ref Range    Sodium 137 133 - 144 mmol/L    Potassium 4.2 3.4 - 5.3 mmol/L    Chloride 103 94 - 109 mmol/L    Carbon Dioxide 28 20 - 32 mmol/L    Anion Gap 6 3 - 14 mmol/L    Glucose 105 (H) 70 - 99 mg/dL    Urea Nitrogen 7 7 - 30 mg/dL    Creatinine 0.68 0.52 - 1.04 mg/dL    GFR Estimate 86 >60 mL/min/1.7m2    GFR Estimate If Black >90 >60 mL/min/1.7m2    Calcium 8.4 (L) 8.5 - 10.1 mg/dL    Bilirubin Total 0.3 0.2 - 1.3 mg/dL    Albumin 3.5 3.4 - 5.0 g/dL    Protein Total 6.9 6.8 - 8.8 g/dL    Alkaline Phosphatase 70 40 - 150 U/L    ALT 20 0 - 50 U/L    AST 19 0 - 45 U/L       Recent Labs   Lab Test 03/23/17  0840 08/06/12  1044 01/26/11  1131   HGB 13.8 14.1 12.6   PLT  --  205 258     --   --    POTASSIUM 4.2  --   --    CR 0.71  --   --       IMPRESSION:   Reason for surgery/procedure: left rotator cuff tear // LEFT ROTATOR CUFF REPAIR WITH DISTAL CLAVICLE  EXCISION, ACROMIOPLASTY AND POSSIBLE BICEPS TENODESIS   Diagnosis/reason for consult:  Surgical clearance    The proposed surgical procedure is considered INTERMEDIATE risk.    REVISED CARDIAC RISK INDEX  The patient has the following serious cardiovascular risks for perioperative complications such as (MI, PE, VFib and 3  AV Block):  No serious cardiac risks  INTERPRETATION: 0 risks: Class I (very low risk - 0.4% complication rate)    The patient has the following additional risks for perioperative complications:  No identified additional risks      ICD-10-CM    1. Preop general physical exam Z01.818 EKG 12-lead complete w/read - Clinics     CBC with platelets     Comprehensive metabolic panel   2. Complete rotator cuff tear of left shoulder M75.122    3. Left foot pain M79.672 XR Foot Left G/E 3 Views   4. Moderate major depression (H) F32.1      RECOMMENDATIONS:       --Patient is to take all scheduled medications on the day of surgery EXCEPT for modifications listed below.    Okay to take medications on the AM of surgery with a small sip of water.  Can use Tylenol up until the time of your surgery.     APPROVAL GIVEN to proceed with proposed procedure, without further diagnostic evaluation    Signed Electronically by: Melonie Lucia MD    The information in this document, created by the medical scribe for me, accurately reflects the services I personally performed and the decisions made by me. I have reviewed and approved this document for accuracy prior to leaving the patient care area.    Copy of this evaluation report is provided to requesting physician.    [facility] Preop Guidelines    Revised Cardiac Risk Index

## 2018-12-11 PROBLEM — M25.812 SHOULDER IMPINGEMENT, LEFT: Status: ACTIVE | Noted: 2018-12-11

## 2018-12-11 PROBLEM — M19.012 ARTHROSIS OF LEFT ACROMIOCLAVICULAR JOINT: Status: ACTIVE | Noted: 2018-12-11

## 2018-12-18 ENCOUNTER — ANESTHESIA EVENT (OUTPATIENT)
Dept: SURGERY | Facility: AMBULATORY SURGERY CENTER | Age: 73
End: 2018-12-18

## 2018-12-20 NOTE — ANESTHESIA PREPROCEDURE EVALUATION
"Anesthesia Pre-Procedure Evaluation    Patient: Nicki Guerra   MRN:     [id number] Gender:   female   Age:    73 year old :      1945        Preoperative Diagnosis: LEFT ROTATOR CUFF TEAR; LEFT IMPINGEMENT SYNDROME; LEFT ACROMIOCLAVICULAR ARTHROSIS   Procedure(s):  LEFT ROTATOR CUFF REPAIR WITH DISTAL CLAVICLE EXCISION, ACROMIOPLASTY AND POSSIBLE BICEPS TENODESIS     Past Medical History:   Diagnosis Date     Allergic rhinitis, cause unspecified      Depressive disorder, not elsewhere classified      Enthesopathy of hip region      Family history of psychiatric condition      Head injury, unspecified      Hypotension, unspecified      Lateral epicondylitis  of elbow      Premenopausal menorrhagia      Sciatica       Past Surgical History:   Procedure Laterality Date     HC DILATION/CURETTAGE DIAG/THER NON OB  1969               [facility] AN PHYSICAL EXAM    Lab Results   Component Value Date    WBC 6.6 12/10/2018    HGB 12.7 12/10/2018    HCT 37.8 12/10/2018     12/10/2018     12/10/2018    POTASSIUM 4.2 12/10/2018    CHLORIDE 103 12/10/2018    CO2 28 12/10/2018    BUN 7 12/10/2018    CR 0.68 12/10/2018     (H) 12/10/2018    JESSICA 8.4 (L) 12/10/2018    ALBUMIN 3.5 12/10/2018    PROTTOTAL 6.9 12/10/2018    ALT 20 12/10/2018    AST 19 12/10/2018    ALKPHOS 70 12/10/2018    BILITOTAL 0.3 12/10/2018    TSH 1.45 2009       Preop Vitals  BP Readings from Last 3 Encounters:   12/10/18 102/70   18 144/87   11/15/18 129/73    Pulse Readings from Last 3 Encounters:   12/10/18 60   11/15/18 65   18 64      Resp Readings from Last 3 Encounters:   12/10/18 16   11/15/18 16   18 17    SpO2 Readings from Last 3 Encounters:   12/10/18 98%   11/15/18 98%   18 99%      Temp Readings from Last 1 Encounters:   12/10/18 98  F (36.7  C)    Ht Readings from Last 1 Encounters:   12/10/18 1.651 m (5' 5\")      Wt Readings from Last 1 Encounters:   12/10/18 59 kg (130 lb)    Estimated " "body mass index is 21.63 kg/m  as calculated from the following:    Height as of 12/10/18: 1.651 m (5' 5\").    Weight as of 12/10/18: 59 kg (130 lb).     LDA:            Assessment:   ASA SCORE: 2    NPO Status: > 2 hours since completed Clear Liquids; > 6 hours since completed Solid Foods   Documentation: H&P complete; Preop Testing complete; Consents complete   Proceeding: Proceed without further delay  Tobacco Use:  Active user of Tobacco     Plan:   Anes. Type:  General; Regional     RA Details:  Exparel; SS; FOR POSTOP PAIN CONTROL     RA-Location/Type: Nerve Block; Interscalene   Pre-Induction: Midazolam IV; Acetaminophen PO   Induction:  IV (Standard)   Airway: LMA   Access/Monitoring: PIV   Maintenance: Balanced   Emergence: Procedure Site   Logistics: Same Day Surgery     Postop Pain/Sedation Strategy:  Standard-Options: Opioids PRN     PONV Management:  Adult Risk Factors: Female, Postop Opioids  Prevention: Ondansetron; Dexamethasone     CONSENT: Direct conversation   Plan and risks discussed with: Patient                            Matti Rader MD  "

## 2018-12-21 ENCOUNTER — NURSE TRIAGE (OUTPATIENT)
Dept: NURSING | Facility: CLINIC | Age: 73
End: 2018-12-21

## 2018-12-21 ENCOUNTER — ANESTHESIA (OUTPATIENT)
Dept: SURGERY | Facility: AMBULATORY SURGERY CENTER | Age: 73
End: 2018-12-21
Payer: COMMERCIAL

## 2018-12-21 ENCOUNTER — HOSPITAL ENCOUNTER (OUTPATIENT)
Facility: AMBULATORY SURGERY CENTER | Age: 73
Discharge: HOME | End: 2018-12-21
Attending: ORTHOPAEDIC SURGERY | Admitting: ORTHOPAEDIC SURGERY
Payer: COMMERCIAL

## 2018-12-21 VITALS
TEMPERATURE: 97.6 F | OXYGEN SATURATION: 98 % | HEART RATE: 62 BPM | DIASTOLIC BLOOD PRESSURE: 85 MMHG | RESPIRATION RATE: 14 BRPM | SYSTOLIC BLOOD PRESSURE: 140 MMHG

## 2018-12-21 DIAGNOSIS — M19.012 ARTHROSIS OF LEFT ACROMIOCLAVICULAR JOINT: ICD-10-CM

## 2018-12-21 DIAGNOSIS — M75.22 BICEPS TENDONITIS, LEFT: Primary | ICD-10-CM

## 2018-12-21 DIAGNOSIS — M75.122 COMPLETE TEAR OF LEFT ROTATOR CUFF: ICD-10-CM

## 2018-12-21 DIAGNOSIS — M25.812 SHOULDER IMPINGEMENT, LEFT: ICD-10-CM

## 2018-12-21 PROCEDURE — 23412 REPAIR ROTATOR CUFF CHRONIC: CPT | Mod: LT

## 2018-12-21 PROCEDURE — 23412 REPAIR ROTATOR CUFF CHRONIC: CPT | Mod: LT | Performed by: ORTHOPAEDIC SURGERY

## 2018-12-21 PROCEDURE — 23130 ACROMP/ACROMIONECTOMY PRTL: CPT | Mod: 59,LT

## 2018-12-21 PROCEDURE — 23120 CLAVICULECTOMY PARTIAL: CPT | Mod: LT

## 2018-12-21 PROCEDURE — 23120 CLAVICULECTOMY PARTIAL: CPT | Mod: 59 | Performed by: ORTHOPAEDIC SURGERY

## 2018-12-21 PROCEDURE — 23430 REPAIR BICEPS TENDON: CPT | Mod: LT

## 2018-12-21 PROCEDURE — G8916 PT W IV AB GIVEN ON TIME: HCPCS

## 2018-12-21 PROCEDURE — G8907 PT DOC NO EVENTS ON DISCHARG: HCPCS

## 2018-12-21 PROCEDURE — 23430 REPAIR BICEPS TENDON: CPT | Mod: 59 | Performed by: ORTHOPAEDIC SURGERY

## 2018-12-21 RX ORDER — FENTANYL CITRATE 50 UG/ML
INJECTION, SOLUTION INTRAMUSCULAR; INTRAVENOUS PRN
Status: DISCONTINUED | OUTPATIENT
Start: 2018-12-21 | End: 2018-12-21

## 2018-12-21 RX ORDER — NALOXONE HYDROCHLORIDE 0.4 MG/ML
.1-.4 INJECTION, SOLUTION INTRAMUSCULAR; INTRAVENOUS; SUBCUTANEOUS
Status: DISCONTINUED | OUTPATIENT
Start: 2018-12-21 | End: 2018-12-22 | Stop reason: HOSPADM

## 2018-12-21 RX ORDER — FENTANYL CITRATE 50 UG/ML
25-50 INJECTION, SOLUTION INTRAMUSCULAR; INTRAVENOUS
Status: DISCONTINUED | OUTPATIENT
Start: 2018-12-21 | End: 2018-12-22 | Stop reason: HOSPADM

## 2018-12-21 RX ORDER — BUPIVACAINE HYDROCHLORIDE 5 MG/ML
INJECTION, SOLUTION EPIDURAL; INTRACAUDAL PRN
Status: DISCONTINUED | OUTPATIENT
Start: 2018-12-21 | End: 2018-12-21

## 2018-12-21 RX ORDER — SODIUM CHLORIDE, SODIUM LACTATE, POTASSIUM CHLORIDE, CALCIUM CHLORIDE 600; 310; 30; 20 MG/100ML; MG/100ML; MG/100ML; MG/100ML
INJECTION, SOLUTION INTRAVENOUS CONTINUOUS
Status: DISCONTINUED | OUTPATIENT
Start: 2018-12-21 | End: 2018-12-22 | Stop reason: HOSPADM

## 2018-12-21 RX ORDER — LIDOCAINE 40 MG/G
CREAM TOPICAL
Status: DISCONTINUED | OUTPATIENT
Start: 2018-12-21 | End: 2018-12-22 | Stop reason: HOSPADM

## 2018-12-21 RX ORDER — ONDANSETRON 2 MG/ML
4 INJECTION INTRAMUSCULAR; INTRAVENOUS EVERY 30 MIN PRN
Status: DISCONTINUED | OUTPATIENT
Start: 2018-12-21 | End: 2018-12-22 | Stop reason: HOSPADM

## 2018-12-21 RX ORDER — ACETAMINOPHEN 325 MG/1
975 TABLET ORAL ONCE
Status: COMPLETED | OUTPATIENT
Start: 2018-12-21 | End: 2018-12-21

## 2018-12-21 RX ORDER — OXYCODONE HYDROCHLORIDE 5 MG/1
5 TABLET ORAL EVERY 4 HOURS PRN
Status: DISCONTINUED | OUTPATIENT
Start: 2018-12-21 | End: 2018-12-22 | Stop reason: HOSPADM

## 2018-12-21 RX ORDER — HYDROMORPHONE HYDROCHLORIDE 1 MG/ML
.3-.5 INJECTION, SOLUTION INTRAMUSCULAR; INTRAVENOUS; SUBCUTANEOUS EVERY 10 MIN PRN
Status: DISCONTINUED | OUTPATIENT
Start: 2018-12-21 | End: 2018-12-22 | Stop reason: HOSPADM

## 2018-12-21 RX ORDER — CLINDAMYCIN PHOSPHATE 900 MG/50ML
900 INJECTION, SOLUTION INTRAVENOUS
Status: DISCONTINUED | OUTPATIENT
Start: 2018-12-21 | End: 2018-12-22 | Stop reason: HOSPADM

## 2018-12-21 RX ORDER — ONDANSETRON 4 MG/1
4 TABLET, ORALLY DISINTEGRATING ORAL EVERY 30 MIN PRN
Status: DISCONTINUED | OUTPATIENT
Start: 2018-12-21 | End: 2018-12-22 | Stop reason: HOSPADM

## 2018-12-21 RX ORDER — PROPOFOL 10 MG/ML
INJECTION, EMULSION INTRAVENOUS CONTINUOUS PRN
Status: DISCONTINUED | OUTPATIENT
Start: 2018-12-21 | End: 2018-12-21

## 2018-12-21 RX ORDER — FLUMAZENIL 0.1 MG/ML
0.2 INJECTION, SOLUTION INTRAVENOUS
Status: DISCONTINUED | OUTPATIENT
Start: 2018-12-21 | End: 2018-12-22 | Stop reason: HOSPADM

## 2018-12-21 RX ORDER — LIDOCAINE HYDROCHLORIDE 20 MG/ML
INJECTION, SOLUTION INFILTRATION; PERINEURAL PRN
Status: DISCONTINUED | OUTPATIENT
Start: 2018-12-21 | End: 2018-12-21

## 2018-12-21 RX ORDER — HYDROXYZINE PAMOATE 25 MG/1
25 CAPSULE ORAL 3 TIMES DAILY PRN
Qty: 30 CAPSULE | Refills: 1 | Status: SHIPPED | OUTPATIENT
Start: 2018-12-21 | End: 2019-03-04

## 2018-12-21 RX ORDER — DEXAMETHASONE SODIUM PHOSPHATE 4 MG/ML
INJECTION, SOLUTION INTRA-ARTICULAR; INTRALESIONAL; INTRAMUSCULAR; INTRAVENOUS; SOFT TISSUE PRN
Status: DISCONTINUED | OUTPATIENT
Start: 2018-12-21 | End: 2018-12-21

## 2018-12-21 RX ORDER — FENTANYL CITRATE 50 UG/ML
25-50 INJECTION, SOLUTION INTRAMUSCULAR; INTRAVENOUS EVERY 5 MIN PRN
Status: DISCONTINUED | OUTPATIENT
Start: 2018-12-21 | End: 2018-12-22 | Stop reason: HOSPADM

## 2018-12-21 RX ORDER — PROPOFOL 10 MG/ML
INJECTION, EMULSION INTRAVENOUS PRN
Status: DISCONTINUED | OUTPATIENT
Start: 2018-12-21 | End: 2018-12-21

## 2018-12-21 RX ORDER — CLINDAMYCIN PHOSPHATE 900 MG/50ML
900 INJECTION, SOLUTION INTRAVENOUS SEE ADMIN INSTRUCTIONS
Status: DISCONTINUED | OUTPATIENT
Start: 2018-12-21 | End: 2018-12-22 | Stop reason: HOSPADM

## 2018-12-21 RX ORDER — GABAPENTIN 300 MG/1
300 CAPSULE ORAL ONCE
Status: DISCONTINUED | OUTPATIENT
Start: 2018-12-21 | End: 2018-12-22 | Stop reason: HOSPADM

## 2018-12-21 RX ORDER — GLYCOPYRROLATE 0.2 MG/ML
INJECTION, SOLUTION INTRAMUSCULAR; INTRAVENOUS PRN
Status: DISCONTINUED | OUTPATIENT
Start: 2018-12-21 | End: 2018-12-21

## 2018-12-21 RX ORDER — OXYCODONE AND ACETAMINOPHEN 5; 325 MG/1; MG/1
1-2 TABLET ORAL EVERY 4 HOURS PRN
Qty: 50 TABLET | Refills: 0 | Status: SHIPPED | OUTPATIENT
Start: 2018-12-21 | End: 2019-03-04

## 2018-12-21 RX ORDER — MEPERIDINE HYDROCHLORIDE 25 MG/ML
12.5 INJECTION INTRAMUSCULAR; INTRAVENOUS; SUBCUTANEOUS
Status: DISCONTINUED | OUTPATIENT
Start: 2018-12-21 | End: 2018-12-22 | Stop reason: HOSPADM

## 2018-12-21 RX ORDER — ONDANSETRON 2 MG/ML
INJECTION INTRAMUSCULAR; INTRAVENOUS PRN
Status: DISCONTINUED | OUTPATIENT
Start: 2018-12-21 | End: 2018-12-21

## 2018-12-21 RX ORDER — ACETAMINOPHEN 325 MG/1
975 TABLET ORAL ONCE
Status: DISCONTINUED | OUTPATIENT
Start: 2018-12-21 | End: 2018-12-22 | Stop reason: HOSPADM

## 2018-12-21 RX ADMIN — SODIUM CHLORIDE, SODIUM LACTATE, POTASSIUM CHLORIDE, CALCIUM CHLORIDE: 600; 310; 30; 20 INJECTION, SOLUTION INTRAVENOUS at 08:37

## 2018-12-21 RX ADMIN — FENTANYL CITRATE 50 MCG: 50 INJECTION, SOLUTION INTRAMUSCULAR; INTRAVENOUS at 08:28

## 2018-12-21 RX ADMIN — FENTANYL CITRATE 25 MCG: 50 INJECTION, SOLUTION INTRAMUSCULAR; INTRAVENOUS at 10:10

## 2018-12-21 RX ADMIN — PROPOFOL 100 MG: 10 INJECTION, EMULSION INTRAVENOUS at 08:50

## 2018-12-21 RX ADMIN — FENTANYL CITRATE 25 MCG: 50 INJECTION, SOLUTION INTRAMUSCULAR; INTRAVENOUS at 11:51

## 2018-12-21 RX ADMIN — Medication 100 MCG: at 09:12

## 2018-12-21 RX ADMIN — CLINDAMYCIN PHOSPHATE 900 MG: 900 INJECTION, SOLUTION INTRAVENOUS at 08:39

## 2018-12-21 RX ADMIN — GLYCOPYRROLATE 0.2 MG: 0.2 INJECTION, SOLUTION INTRAMUSCULAR; INTRAVENOUS at 08:42

## 2018-12-21 RX ADMIN — LIDOCAINE HYDROCHLORIDE 60 MG: 20 INJECTION, SOLUTION INFILTRATION; PERINEURAL at 08:50

## 2018-12-21 RX ADMIN — ACETAMINOPHEN 650 MG: 325 TABLET ORAL at 08:12

## 2018-12-21 RX ADMIN — Medication 100 MCG: at 09:25

## 2018-12-21 RX ADMIN — PROPOFOL 100 MCG/KG/MIN: 10 INJECTION, EMULSION INTRAVENOUS at 08:49

## 2018-12-21 RX ADMIN — FENTANYL CITRATE 25 MCG: 50 INJECTION, SOLUTION INTRAMUSCULAR; INTRAVENOUS at 11:41

## 2018-12-21 RX ADMIN — SODIUM CHLORIDE, SODIUM LACTATE, POTASSIUM CHLORIDE, CALCIUM CHLORIDE: 600; 310; 30; 20 INJECTION, SOLUTION INTRAVENOUS at 10:10

## 2018-12-21 RX ADMIN — OXYCODONE HYDROCHLORIDE 5 MG: 5 TABLET ORAL at 11:31

## 2018-12-21 RX ADMIN — FENTANYL CITRATE 25 MCG: 50 INJECTION, SOLUTION INTRAMUSCULAR; INTRAVENOUS at 10:22

## 2018-12-21 RX ADMIN — BUPIVACAINE HYDROCHLORIDE 10 ML: 5 INJECTION, SOLUTION EPIDURAL; INTRACAUDAL at 08:35

## 2018-12-21 RX ADMIN — FENTANYL CITRATE 25 MCG: 50 INJECTION, SOLUTION INTRAMUSCULAR; INTRAVENOUS at 11:32

## 2018-12-21 RX ADMIN — DEXAMETHASONE SODIUM PHOSPHATE 4 MG: 4 INJECTION, SOLUTION INTRA-ARTICULAR; INTRALESIONAL; INTRAMUSCULAR; INTRAVENOUS; SOFT TISSUE at 10:46

## 2018-12-21 RX ADMIN — ONDANSETRON 4 MG: 2 INJECTION INTRAMUSCULAR; INTRAVENOUS at 09:13

## 2018-12-21 RX ADMIN — FENTANYL CITRATE 25 MCG: 50 INJECTION, SOLUTION INTRAMUSCULAR; INTRAVENOUS at 11:26

## 2018-12-21 RX ADMIN — FENTANYL CITRATE 25 MCG: 50 INJECTION, SOLUTION INTRAMUSCULAR; INTRAVENOUS at 10:08

## 2018-12-21 RX ADMIN — FENTANYL CITRATE 25 MCG: 50 INJECTION, SOLUTION INTRAMUSCULAR; INTRAVENOUS at 10:30

## 2018-12-21 NOTE — OP NOTE
SURGEON:  GARY CHAND MD    ASSISTANT  Miles Rodriguez PA-C     ANESTHESIA: General                             FINDINGS:  2.5 cm supraspinatus rotator cuff tear, Impingement and acromioclavicular spurring, biceps partial tearing, partial subscapularis tear.    ESTIMATED BLOOD LOSS: 50 cc       DRAINS: None    COMPLICATIONS:  None    SPECIMENS: Not sent clavicle and acromion.    DATE OF SURGERY:  12/21/2018     PREOPERATIVE AND DISCHARGE DIAGNOSIS  1. Left rotator cuff tear.  2. Left acromioclavicular arthritis.  3. Impingement.   4. Partial biceps tendon tear    PROCEDURE   1. Left rotator cuff repair.  2. Left distal clavicle excision.  3. Acromioplasty with coracoacromial ligament resection.  4. Biceps tenodesis    HISTORY   This is a 73 year old female who presents with left rotator cuff tear, acromioclavicular  Arthritis, partial biceps tendon tear and impingement. MRI shows 2 cm rotator cuff tear of supraspinatus and a partial subscapularis tear.  She has failed  conservative treatment and presents for left rotator cuff repair, clavicle excision, biceps tenodesis and  acromioplasty.    SURGERY   After smooth general endotracheal anesthesia and interscalene block, the patient was placed in a  beach-chair position. The left shoulder was prepped and draped in the usual  fashion.  Spider arm polo was used.  Pause was performed for patient verification.  A saber incision was  made from the mid acromion to the lateral coracoid, and carried down through  subcutaneous tissue.  The deltoid fascia was divided 5 cm, starting at the AC  joint.  It was also released from the anterior acromion and the distal  clavicle.  The coracoacromial ligament was identified and resected.  The  distal clavicle was subperiosteally exposed, and the distal 1 cm removed with  the oscillating saw and smoothed with a rasp.  There was a sharp overhang on  the anterior acromion that was aggressively debrided with osteotome  and  rongeur, then smoothed with a rasp.  We excised the thickened bursa at this  point.  The rotator cuff was examined showing a 2.5 cms tear in the supraspinatus away from the bicipital groove.  There was at least 1.0 cms of intact supraspinatus at bicipital groove..  The biceps was partially torn beneath this.  I opened the rotator interval to gain access to the biceps tendon.  The subscapularis had partial deep surface tearing, but still had well over 50% intact.  I elected not to repair subscapularis.  I cut the biceps away from the glenoid, decorticated the bicipital groove, and tenodesed the biceps with #2 Polydek.  The edges of the supraspinatus tear were excised to obtain vascular edge.  The tuberosity was decorticated.  The rotator cuff tear was then repaired directly to the tuberosity with 3 interrupted vertical mattress #2 Polydek sutures.  I sealed this with a 0-vicryl whip stitch.    We now had a water-tight closure of the rotator cuff.   The wound  was irrigated, and bleeding controlled with electrocautery. The deltoid  fascia was repaired to the trapezial fascia and the anterior acromion with  interrupted #1 Vicryl suture.  The side-to-side fiber division of the deltoid was closed with  running 0 Vicryl.  Subcutaneous tissue was closed with interrupted 2-0 Vicryl  suture, and the skin edges were closed with running 3-0 Prolene subcuticular  closure.  Steri-strips were applied.  Sterile dressings were applied. The  patient was taken to the recovery room in stable condition.          Dinesh Hermosillo M.D.  Department of Orthopaedic Surgery  [facility]        PATIENT: Nicki Guerra    MR#: [id number]   : 1945

## 2018-12-21 NOTE — PROGRESS NOTES
Patient took a 25 minute nap.  Feeling better. Shoulder still hurts.  More awake. Able to sit on edge of bed and walk to wheelchair without difficulty.  Pt able to move self into car with minimal assist.

## 2018-12-21 NOTE — PROGRESS NOTES
Pt states that her shoulder hurts. Rated it 8/10.  Medicated with 100 mcgs fentanyl and 5mg oxycodone. Very Sleepy. Arouses easily.

## 2018-12-21 NOTE — BRIEF OP NOTE
PROCEDURE NOTE:    Pre-operative diagnosis: LEFT ROTATOR CUFF TEAR; LEFT IMPINGEMENT SYNDROME; LEFT ACROMIOCLAVICULAR ARTHROSIS   Post-operative diagnosis: 1. Left rotator cuff tear   2. Left shoulder impingement   3. Left acromioclavicular arthrosis   4. Left biceps tendinitis   Procedure: Left rotator cuff repair with distal clavicle excision, acromioplasty and biceps tenodesis   Surgeon: Dinesh Hermosillo MD   Assistant(s): Miles Rodriguez PA-C   Estimated blood loss: 50 ml   Specimens: None   Findings: 1. Left rotator cuff tear   2. Left shoulder impingement   3. Left acromioclavicular arthrosis   4. Left biceps tendinitis   Anesthesia: General mask   Drains: None   Complications: None   Weight bearing status: Weight bearing as tolerated   Activity: Wear immobilizer for three weeks.  No reaching forward with surgical arm.  Okay to have immobilizer off for pendulum exercises.       Miles Rodriguez PA-C  Supervising physician: Dinesh Hermosillo MD  Dept. of Orthopedics  [facility]

## 2018-12-21 NOTE — PROGRESS NOTES
"Patient dressed with much assistance.  Has 2 friends here to get her home.  Pt very sleepy/unable to keep eyes open.  C/O feeling dizzy.  Difficulty sitting up on edge of cart. Dr Rader saw patient. Pt c/o \"deep ache\" in shoulder--Left arm still numb from block.    "

## 2018-12-21 NOTE — ANESTHESIA PROCEDURE NOTES
Peripheral nerve/Neuraxial procedure note : Interscalene  Pre-Procedure  Performed by Matti Rader MD  Location: pre-op    Procedure Times:12/21/2018 9:35 AM and 12/21/2018 9:40 AM  Pre-Anesthestic Checklist: patient identified, IV checked, site marked, risks and benefits discussed, informed consent, monitors and equipment checked, pre-op evaluation, at physician/surgeon's request and post-op pain management    Timeout  Correct Patient: Yes   Correct Procedure: Yes   Correct Site: Yes   Correct Laterality: Yes   Correct Position: Yes   Site Marked: Yes   .   Procedure Documentation    Diagnosis:LEFT SHOULDER SURGERY.    Procedure:  left  Interscalene.     Ultrasound used to identify targeted nerve, plexus, or vascular marker and placed a needle adjacent to it., Ultrasound was used to visualize the spread of the anesthetic in close proximity to the above stated nerve. A permanent image is entered into the patient's record.  Patient Prep;mask, sterile gloves, chlorhexidine gluconate and isopropyl alcohol.  .  Needle: short bevel Needle Length (millimeters) 50 Insertion Method: Single Shot.       Assessment/Narrative  Paresthesias: No.  Injection made incrementally with aspirations every 2 mL..  The placement was negative for: blood aspirated, painful injection and site bleeding.  Bolus given via needle..   Secured via.   Complications: none.

## 2018-12-21 NOTE — ANESTHESIA POSTPROCEDURE EVALUATION
Anesthesia POST Procedure Evaluation    Patient: Nicki Guerra   MRN:     [id number] Gender:   female   Age:    73 year old :      1945        Preoperative Diagnosis: LEFT ROTATOR CUFF TEAR; LEFT IMPINGEMENT SYNDROME; LEFT ACROMIOCLAVICULAR ARTHROSIS   Procedure(s):  LEFT ROTATOR CUFF REPAIR WITH DISTAL CLAVICLE EXCISION, ACROMIOPLASTY AND BICEPS TENODESIS   Postop Comments: No value filed.       Anesthesia Type:  General, Regional    Reportable Event: NO     PAIN: Uncomplicated   Sign Out status: Comfortable, Well controlled pain     PONV: No PONV   Sign Out status:  No Nausea or Vomiting     Neuro/Psych: Uneventful perioperative course   Sign Out Status: Preoperative baseline; Age appropriate mentation     Airway/Resp.: Uneventful perioperative course   Sign Out Status: Non labored breathing, age appropriate RR; Resp. Status within EXPECTED Parameters     CV: Uneventful perioperative course   Sign Out status: Appropriate BP and perfusion indices; Appropriate HR/Rhythm     Disposition:   Sign Out in:  PACU  Disposition:  Phase II; Home  Recovery Course: Uneventful  Follow-Up: Not required     Comments/Narrative:  Patient reporting deep shoulder soreness/pain. She is unable to move arm and it is numb.            Last Anesthesia Record Vitals:  CRNA VITALS  2018 1046 - 2018 1146      2018             SpO2:  86 %  (Abnormal)     Resp Rate (observed):  1  (Abnormal)           Last PACU/Preop Vitals:  Vitals:    18 1229 18 1230 18 1244   BP:  141/87    Pulse:      Resp: (P) 14 (P) 14 (P) 16   Temp:   (P) 97.6  F (36.4  C)   SpO2: 96%  93%         Electronically Signed By: Matti Rader MD, 2018, 1:31 PM

## 2018-12-21 NOTE — DISCHARGE INSTRUCTIONS
[facility]  Same-Day Surgery   Adult Discharge Orders & Instructions   For 24 hours after surgery  1. Get plenty of rest.  A responsible adult must stay with you for at least 24 hours after you leave the hospital.   2. Do not drive or use heavy equipment.  If you have weakness or tingling, don't drive or use heavy equipment until this feeling goes away.  3. Do not drink alcohol.  4. Avoid strenuous or risky activities.  Ask for help when climbing stairs.   5. You may feel lightheaded.  IF so, sit for a few minutes before standing.  Have someone help you get up.   6. If you have nausea (feel sick to your stomach): Drink only clear liquids such as apple juice, ginger ale, broth or 7-Up.  Rest may also help.  Be sure to drink enough fluids.  Move to a regular diet as you feel able.  7. You may have a slight fever. Call the doctor if your fever is over 100 F (37.7 C) (taken under the tongue) or lasts longer than 24 hours.  8. You may have a dry mouth, a sore throat, muscle aches or trouble sleeping.  These should go away after 24 hours.  9. Do not make important or legal decisions.   Call your doctor for any of the followin.  Signs of infection (fever, growing tenderness at the surgery site, a large amount of drainage or bleeding, severe pain, foul-smelling drainage, redness, swelling).    2. It has been over 8 to 10 hours since surgery and you are still not able to urinate (pass water).    3.  Headache for over 24 hours.      To contact a doctor, call ____[phone]_______________________     Tylenol was given at 8:00 AM    Managing Your Pain   Pain management is an important part of your care. When you are in pain or uncomfortable, it can affect the way you feel both physically and emotionally.   The longer pain goes untreated, the harder it is to relieve. Effective pain management can break the pain cycle.   When you take care of your pain before it becomes a problem you will:     Heal  faster     Be more comfortable when walking and doing breathing exercises     Regain your strength faster     Other Ways to Manage Pain   There are many ways besides medication to treat your pain. Ask your nurse or doctor for more information about:     Relaxation techniques     Guided imagery     Breathing exercises     Hot or cold packs     Massage     Changing position (elevation or support)     Using pillows or splints to protect incisions when coughing, laughing, etc.     Music     The goal is for you to be able to complete activities such as turning in bed, walking and doing deep breathing exercises with only mild to moderate pain.   Possible Side Effects of Pain Medications     Constipation     Sleepiness     Dry mouth     Nausea and/or vomiting   It is important for you to let your nurse or doctor know if you have any of these side effects.     What You Can Do to Help with the Side Effects     Drink as much fluid as possible     Eat foods high in fiber (beans, lentils, fruits)     Ask for medication if you continue to have problems with constipation     Suck on sugarless hard candy, or ice     Take pain medications with food     Peppermint can be helpful to decrease nausea     Managing Your Pain at Home   Your doctor may give you a prescription for pain medicine to take at home. Most pain medications to be taken at home are in pill form.   Your nurse will review the instructions for taking your pain medications. When taken by mouth, medication can take up to 30 minutes to be effective. Remember to take pain medication when your pain first begins      Remember, same day surgery does not mean same day recovery.  Healing is a gradual process.  It is normal to be impatient and feel discouraged while waiting for swelling, bruising, discomfort and numbness to diminish.  Allow yourself to be a patient!  Extra rest, a nutritious diet, and avoidance of stress are important aids to recovery.    Managing Your Pain    Pain management is an important part of your care. When you are in pain or uncomfortable, it can affect the way you feel both physically and emotionally.   The longer pain goes untreated, the harder it is to relieve. Effective pain management can break the pain cycle.   When you take care of your pain before it becomes a problem you will:     Heal faster     Be more comfortable when walking and doing breathing exercises     Regain your strength faster     Other Ways to Manage Pain   There are many ways besides medication to treat your pain. Ask your nurse or doctor for more information about:     Relaxation techniques     Guided imagery     Breathing exercises     Hot or cold packs     Massage     Changing position (elevation or support)     Using pillows or splints to protect incisions when coughing, laughing, etc.     Music     The goal is for you to be able to complete activities such as turning in bed, walking and doing deep breathing exercises with only mild to moderate pain.   Possible Side Effects of Pain Medications     Constipation     Sleepiness     Dry mouth     Nausea and/or vomiting   It is important for you to let your nurse or doctor know if you have any of these side effects.     What You Can Do to Help with the Side Effects     Drink as much fluid as possible     Eat foods high in fiber (beans, lentils, fruits)     Ask for medication if you continue to have problems with constipation     Suck on sugarless hard candy, or ice     Take pain medications with food     Peppermint can be helpful to decrease nausea     Managing Your Pain at Home   Your doctor may give you a prescription for pain medicine to take at home. Most pain medications to be taken at home are in pill form.   Your nurse will review the instructions for taking your pain medications. When taken by mouth, medication can take up to 30 minutes to be effective. Remember to take pain medication when your pain first begins      Remember,  "same day surgery does not mean same day recovery.  Healing is a gradual process.  It is normal to be impatient and feel discouraged while waiting for swelling, bruising, discomfort and numbness to diminish.  Allow yourself to be a patient!  Extra rest, a nutritious diet, and avoidance of stress are important aids to recovery.    Information about liposomal bupivacaine (Exparel)    What is Liposomal Bupivacaine?    Liposomal Bupivacaine is a numbing medication that can help you manage your pain after surgery.  This medication is similar to \"novacaine,\" which is often used by the dentist.  Liposomal bupivacaine is released slowly and can help control pain for up to 72 hours.    What is the purpose of Liposomal Bupivacaine?    To manage your pain after surgery    To help you sleep better, take deep breaths, walk more comfortable, and feel up to visiting with others    How is the procedure done?    Liposomal bupivacaine is a medication given by an injection.    It is usually given right before your surgery.  If this is the case, you will be awake or sedated, but you should experience minimal pain during the procedure.    For some people, the injection may be given at the very end of your surgery.  It all depends on the type of surgery and your situation.    The procedure usually takes about 5-15 minutes.  An ultrasound machine will help the anesthesiologist insert it in the right place or the surgeon will inject it under direct vision.     A needle is used to place the numbing medication under your skin.  It provides pain relief by numbing the tissue in the area where your surgeon will make the incision.    What can I expect?    You may experience numbness, tingling, or a feeling of heaviness around the area that was injected.    If you experience any of the follow symptoms IMMEDIATELY CALL THE REGIONAL ANESTHESIA PAIN SERVICE:    Numbness or tingling occurs in areas other than around the injection site    Blurry " vision    Ringing in your ears    A metallic taste in your mouth    PAGE: Dial [phone].  When prompted, enter the following 4-digit ID number:  [id number].  You will be prompted to enter your phone number; and then enter the # sign.  The clinician on call will call you back.    OR    CALL: Dial [phone].  Let the hospital  know that you are having a problem with a nerve block and that you would like to speak to the regional anesthesia pain service right away.    You should not receive any other type of numbing medication within 4 days after receiving liposomal bupivacaine unless your anesthesiologist approves.      Post Operative Instructions: Regional Anesthetic for Upper Extremity with Liposomal Bupivacaine  General Information:   Regional anesthesia is when local anesthetic or  numbing  medication is injected around the nerves to anesthetize or  numb  the area supplied by that set of nerves. It is a type of analgesia used to control pain and decreases the need for narcotics following surgery.    Types of Regional Blocks:  Interscalene: A block injected into the neck on the operative shoulder/arm of a patient having shoulder surgery  Supraclavicular: A block injected near the clavicle on the operative shoulder of a patient having elbow, forearm, or hand surgery    Procedure:  The type of anesthesia your doctor used to numb your shoulder or arm will usually not start to wear off for 24-48 hours, but may last as long as 72 hours. You should be careful during that period, since it is possible to injure your arm without being aware of the injury. While your arm is numb, you should:    Avoid striking or bumping your arm    Avoid extreme hot or cold    Diet:  There are no restrictions on your diet. You should drink plenty of fluids.     Discomfort:  You will have a tingling and prickly sensation in your arm as the feeling begins to return. You can also expect some discomfort. The amount of discomfort is  unpredictable, but if you have more pain than can be controlled with pain medication you should notify your physician.     Pain Medications:  Begin taking your oral pain pills before bedtime and during the night to avoid a sudden onset of pain as part of the block wears off.  Do not engage in drinking, driving, or hazardous occupations while taking pain medication.     Stitches:   You may have stitches or special skin closures. You doctor will inform you when to return to the office to have them removed.     Activity:  On the day of surgery you should try to stay in bed with your hand elevated on pillows. You may resume your normal activity after that, wearing a sling for comfort. Contact your physician if you have any of the problems:     Continued numbness or tingling in the arm or hand after 72 hours    Swelling of the fingers or fingers that are cold to the touch    Excessive bleeding or drainage    Severe pain

## 2018-12-21 NOTE — ANESTHESIA CARE TRANSFER NOTE
Patient: Nicki Guerra    Procedure(s):  LEFT ROTATOR CUFF REPAIR WITH DISTAL CLAVICLE EXCISION, ACROMIOPLASTY AND BICEPS TENODESIS    Diagnosis: LEFT ROTATOR CUFF TEAR; LEFT IMPINGEMENT SYNDROME; LEFT ACROMIOCLAVICULAR ARTHROSIS  Diagnosis Additional Information: No value filed.    Anesthesia Type:   General, Peripheral Nerve Block for post-op pain at surgeon's request     Note:  Airway :Nasal Cannula  Patient transferred to:PACU  Handoff Report: Identifed the Patient, Identified the Reponsible Provider, Reviewed the pertinent medical history, Discussed the surgical course, Reviewed Intra-OP anesthesia mangement and issues during anesthesia, Set expectations for post-procedure period and Allowed opportunity for questions and acknowledgement of understanding      Vitals: (Last set prior to Anesthesia Care Transfer)    CRNA VITALS  12/21/2018 1046 - 12/21/2018 1119      12/21/2018             SpO2:  86 %  (Abnormal)     Resp Rate (observed):  1  (Abnormal)                 Electronically Signed By: JARED Hart CRNA  December 21, 2018  11:19 AM

## 2018-12-22 NOTE — TELEPHONE ENCOUNTER
Vomited after taking pain medication.  Will try just plain Tylenol for a while and sip on ginger ale.  Home advice given.  Nadine Burdick RN  [facility]      Additional Information    Negative: Sounds like a life-threatening emergency to the triager    Negative: [1] Widespread rash AND [2] bright red, sunburn-like    Negative: [1] SEVERE headache AND [2] after spinal (epidural) anesthesia    Negative: [1] Vomiting AND [2] persists > 4 hours    Negative: [1] Vomiting AND [2] abdomen looks much more swollen than usual    Negative: [1] Drinking very little AND [2] dehydration suspected (e.g., no urine > 12 hours, very dry mouth, very lightheaded)    Negative: Patient sounds very sick or weak to the triager    Negative: Sounds like a serious complication to the triager    Negative: Fever > 100.5 F (38.1 C)    Negative: [1] SEVERE post-op pain (e.g., excruciating, pain scale 8-10) AND [2] not controlled with pain medications    Negative: Caller has URGENT question and triager unable to answer question    Negative: [1] Headache AND [2] after spinal (epidural) anesthesia AND [3] not severe    Negative: [1] Daily fever > 99.5 F (37.5 C) AND [2] persists > 3 days    Negative: [1] MILD-MODERATE post-op pain (e.g., pain scale 1-7) AND [2] not controlled with pain medications    Negative: Caller has NON-URGENT question and triager unable to answer question    [1] Vomiting AND [2] present < 4 hours    Negative: General activity, questions about    Negative: Resuming driving, questions about    Negative: Resuming sexual relations, questions about    Negative: Getting the incision wet, questions about    Protocols used: POST-OP SYMPTOMS AND QUESTIONS-[facility]

## 2018-12-31 ENCOUNTER — OFFICE VISIT (OUTPATIENT)
Dept: ORTHOPEDICS | Facility: CLINIC | Age: 73
End: 2018-12-31
Payer: COMMERCIAL

## 2018-12-31 VITALS
OXYGEN SATURATION: 97 % | WEIGHT: 130 LBS | DIASTOLIC BLOOD PRESSURE: 88 MMHG | SYSTOLIC BLOOD PRESSURE: 161 MMHG | HEIGHT: 65 IN | BODY MASS INDEX: 21.66 KG/M2 | HEART RATE: 62 BPM | RESPIRATION RATE: 13 BRPM

## 2018-12-31 DIAGNOSIS — M19.012 ARTHROSIS OF LEFT ACROMIOCLAVICULAR JOINT: ICD-10-CM

## 2018-12-31 DIAGNOSIS — M75.122 COMPLETE TEAR OF LEFT ROTATOR CUFF: Primary | ICD-10-CM

## 2018-12-31 DIAGNOSIS — M75.22 BICEPS TENDONITIS, LEFT: ICD-10-CM

## 2018-12-31 DIAGNOSIS — Z98.890 S/P COMPLETE REPAIR OF ROTATOR CUFF: ICD-10-CM

## 2018-12-31 PROCEDURE — 99024 POSTOP FOLLOW-UP VISIT: CPT | Performed by: ORTHOPAEDIC SURGERY

## 2018-12-31 ASSESSMENT — MIFFLIN-ST. JEOR: SCORE: 1105.56

## 2018-12-31 NOTE — PATIENT INSTRUCTIONS
Will start physical therapy for gentle passive range of motion, advance to active range of motion in 10 days.  Start scar massage with vitamin-E cream.   May stop using the sling or immobilizer in 10 days.  Medication renewal: None # .  Return to work; 1/18/19 with 1 pound lifting.  Return to clinic 4 weeks to start strengthening.

## 2018-12-31 NOTE — LETTER
WORKABILITY    [facility], Dr. Dinesh Hermosillo M.D.  [facility]        12/31/2018      RE: Nicki Guerra    [address]  [place] [address]        To whom it may concern:     Nicki Guerra is under my care for   1. S/P complete repair of rotator cuff       Date of surgery: 12/21/18.     Return to work date: 1/18/19   ** WITH RESTRICTIONS? Yes, with work restrictions: * Restricted lifting - Maximum lift in pounds: 1lb.  DURATION OF LIMITATIONS: unknown    [employer] (Date): unknown    Next appointment: 1 month          Dinesh Hermosillo M.D.

## 2018-12-31 NOTE — PROGRESS NOTES
Follow up left rotator cuff repair, Kezia Villalpando, biceps tenodesis  on 12/21/18.  She had a 2.5 cms tear of supraspinatus.  The partial subscapularis tear did not have a repairable edge.  Pain is mild.   Wound is healing well.  Suture removed.    Assessment:   left rotator cuff repair, Kwasi Kezia, biceps tenodesis doing well.  Surgical findings discussed.   Plan:  Will start physical therapy for gentle passive range of motion, advance to active range of motion in 10 days.  Start scar massage with vitamin-E cream.   May stop using the sling or immobilizer in 10 days.  Medication renewal: None # .  Return to work; 1/18/19 with 1 pound lifting.  Return to clinic 4 weeks to start strengthening.

## 2018-12-31 NOTE — LETTER
12/31/2018         RE: Nicki Guerra  [address]  [place] [address]        Dear Colleague,    Thank you for referring your patient, Nicki Guerra, to the [facility]. Please see a copy of my visit note below.    Follow up left rotator cuff repair, Kezia Villalpando, biceps tenodesis  on 12/21/18.  She had a 2.5 cms tear of supraspinatus.  The partial subscapularis tear did not have a repairable edge.  Pain is mild.   Wound is healing well.  Suture removed.    Assessment:   left rotator cuff repair, Kezia Villalpando, biceps tenodesis doing well.  Surgical findings discussed.   Plan:  Will start physical therapy for gentle passive range of motion, advance to active range of motion in 10 days.  Start scar massage with vitamin-E cream.   May stop using the sling or immobilizer in 10 days.  Medication renewal: None # .  Return to work; 1/18/19 with 1 pound lifting.  Return to clinic 4 weeks to start strengthening.        Again, thank you for allowing me to participate in the care of your patient.        Sincerely,        Dinesh Hermosillo MD

## 2019-01-02 ENCOUNTER — THERAPY VISIT (OUTPATIENT)
Dept: PHYSICAL THERAPY | Facility: CLINIC | Age: 74
End: 2019-01-02
Payer: COMMERCIAL

## 2019-01-02 DIAGNOSIS — Z98.890 S/P COMPLETE REPAIR OF ROTATOR CUFF: ICD-10-CM

## 2019-01-02 PROBLEM — M25.512 ACUTE PAIN OF LEFT SHOULDER: Status: RESOLVED | Noted: 2018-10-02 | Resolved: 2019-01-02

## 2019-01-02 PROCEDURE — 97161 PT EVAL LOW COMPLEX 20 MIN: CPT | Mod: GP | Performed by: PHYSICAL THERAPIST

## 2019-01-02 PROCEDURE — 97110 THERAPEUTIC EXERCISES: CPT | Mod: GP | Performed by: PHYSICAL THERAPIST

## 2019-01-02 NOTE — PROGRESS NOTES
[facility] Initial Evaluation  Subjective:  The history is provided by the patient. No  was used.   Nicki Guerra is a 73 year old female with a left shoulder condition.    Condition occurred: for unknown reasons.  This is a new condition  I had left shoulder pain and stiffness.  The pain gradually got worse.  MRI showed torn RC. Surgery on 12/21/2018, for RC repair.  I am wearing the splint.  I am trying not to over do..    Patient reports pain:  Anterior, lateral and medial (pec muscles).  Radiates to:  Upper arm, shoulder, elbow and cervical.  Pain is described as sharp and aching and is constant and reported as 5/10 and 6/10.  Associated with: not using the arm  Pain is the same all the time.  Exacerbated by: not using arm, wearing sling. and relieved by rest (tylenol).  Since onset symptoms are gradually improving.  Special testing: before surgery, RC tear.  Previous treatment includes physical therapy (before surgery).  There was moderate improvement following previous treatment.  General health as reported by patient is good.  Pertinent medical history includes:  Depression and concussions/dizziness.  Medical allergies: yes (penicillin, IB).  Other surgeries include:  Other (D and C proceedure).  Medication history: anti-anxiety.  Current occupation is [employer], PCA work.  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  Primary job tasks include:  Prolonged sitting, prolonged standing and repetitive tasks.    Barriers include:  Requires assistance with ADL's (condo, due to arm).    Red flags:  None as reported by the patient.                        Objective:  Standing Alignment:    Cervical/Thoracic:  Forward head (wearing sling)  Shoulder/UE:  Scapular abduction L and scapular abduction R                  Flexibility/Screens:   Positive screens:  Shoulder  Upper Extremity:    Decreased left upper extremity flexibility at:  Pectoralis Major and  Pectoralis Minor      Spine:  Decreased left spine flexibility:  Scalenes; Upper Trap and Levator                         Shoulder Evaluation:  ROM:    PROM:    Flexion:  Left:  0-5 degrees          Abduction:  Left:  0-5 degrees        Internal Rotation:  Left:  15      External Rotation:  Left:  Neutral           Elbow Flexion:  Left:  Approx 100      Elbow Extension:  Left:  -80                Strength:  not assessed                      Stability Testing:  not assessed      Special Tests:  not assessed      Palpation:  Palpation assessed shoulder: pectoralis.  Left shoulder tenderness present at:  Biceps; Deltoid; Levator; Upper Trap and Bicipital Groove    Mobility Tests:  not assessed                                                 General     ROS    Assessment/Plan:    Patient is a 73 year old female with left side shoulder complaints.    Patient has the following significant findings with corresponding treatment plan.                Diagnosis 1:  Left RC repair 12/21/2018  Pain -  manual therapy, self management, education and home program  Decreased ROM/flexibility - manual therapy, therapeutic exercise and home program  Impaired posture - neuro re-education and home program  Evaluation ongoing as permitted by MD orders.    Therapy Evaluation Codes:   1) History comprised of:   Personal factors that impact the plan of care:      Living environment.    Comorbidity factors that impact the plan of care are:      Depression.     Medications impacting care: antianxiety.  2) Examination of Body Systems comprised of:   Body structures and functions that impact the plan of care:      Cervical spine, Shoulder and Thoracic Spine.   Activity limitations that impact the plan of care are:      Bathing, Cooking, Driving, Dressing, Grasping, Lifting, Reading/Computer work, Walking, Working, Sleeping, Laying down and reaching.  3) Clinical presentation characteristics are:   Stable/Uncomplicated.  4) Decision-Making    Low  complexity using standardized patient assessment instrument and/or measureable assessment of functional outcome.  Cumulative Therapy Evaluation is: Low complexity.    Previous and current functional limitations:  (See Goal Flow Sheet for this information)    Short term and Long term goals: (See Goal Flow Sheet for this information)     Communication ability:  Patient appears to be able to clearly communicate and understand verbal and written communication and follow directions correctly.  Treatment Explanation - The following has been discussed with the patient:   RX ordered/plan of care  Possible risks and side effects  This patient would benefit from PT intervention to resume normal activities.   Rehab potential is good.    Frequency:  2 X week, once daily  Duration:  for 8 weeks then progressing to 1x/ week for 6 weeks  Discharge Plan:  Achieve all LTG.  Independent in home treatment program.    Please refer to the daily flowsheet for treatment today, total treatment time and time spent performing 1:1 timed codes.

## 2019-01-02 NOTE — LETTER
Please call to advise patient, will need to make an appt to est. Care with a new provider for further refills. In the meantime, I have provided a short supply of medication as it appears he has been taking this regularly for some time. Thank you. LINDA Oneil   [facility]  [address]  [place] [address]  [phone]    2019    Re: Nicki Guerra   :   1945  MRN:  [id number]   REFERRING PHYSICIAN:   Miles Rodriguez    [facility]    Date of Initial Evaluation:  2019  Visits:     Reason for Referral:  S/P complete repair of rotator cuff    EVALUATION SUMMARY    [facility] Initial Evaluation  Subjective:  The history is provided by the patient.  No  was used.   Nicki Guerra is a 73 year old female with a left shoulder condition.  Condition occurred: for unknown reasons.  This is a new condition.  I had left shoulder pain and stiffness.  The pain gradually got worse.  MRI showed torn RC.  Surgery on 2018, for RC repair.  I am wearing the splint.  I am trying not to over do.    Patient reports pain: Anterior, lateral and medial (pec muscles).  Radiates to: Upper arm, shoulder, elbow and cervical.  Pain is described as sharp and aching and is constant and reported as 5/10 and 6/10.  Associated with: not using the arm.  Pain is the same all the time.  Exacerbated by: not using arm, wearing sling, and relieved by rest (tylenol).  Since onset symptoms are gradually improving.  Special testing: before surgery, RC tear.  Previous treatment includes physical therapy (before surgery).  There was moderate improvement following previous treatment.  General health as reported by patient is good.  Pertinent medical history includes: Depression and concussions/dizziness.  Medical allergies: yes (penicillin, IB).  Other surgeries include:  Other (D and C proceedure).  Medication history: anti-anxiety.  Current occupation is [place], PCA work.  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  Primary job tasks include: Prolonged sitting, prolonged standing and  repetitive tasks.  Barriers include: Requires assistance with ADL's (condo, due to arm).  Red flags: None as reported by the patient.    Objective:  Standing Alignment:    Cervical/Thoracic: Forward head (wearing sling)  Shoulder/UE: Scapular abduction L and scapular abduction R    Flexibility/Screens:   Positive screens: Shoulder  Upper Extremity:    Decreased left upper extremity flexibility at: Pectoralis Major and Pectoralis Minor  Spine:  Decreased left spine flexibility: Scalenes; Upper Trap and Levator  Re: Nicki Guerra   :   1945    Shoulder Evaluation:  ROM:  PROM:    Flexion:    Left: 0-5 degrees        Abduction:    Left: 0-5 degrees      Internal Rotation:   Left: 15      External Rotation:   Left: Neutral       Elbow Flexion:   Left: Approx 100      Elbow Extension:   Left: -80        Strength: not assessed    Stability Testing: not assessed    Special Tests: not assessed    Palpation: Palpation assessed shoulder: pectoralis.  Left shoulder tenderness present at: Biceps; Deltoid; Levator; Upper Trap and Bicipital Groove    Mobility Tests: not assessed    Assessment/Plan:    Patient is a 73 year old female with left side shoulder complaints.    Patient has the following significant findings with corresponding treatment plan.                Diagnosis 1: Left RC repair 2018    Pain - manual therapy, self management, education and home program  Decreased ROM/flexibility - manual therapy, therapeutic exercise and home program  Impaired posture - neuro re-education and home program  Evaluation ongoing as permitted by MD orders.    Therapy Evaluation Codes:   1) History comprised of:   Personal factors that impact the plan of care:      Living environment.    Comorbidity factors that impact the plan of care are:      Depression.     Medications impacting care: antianxiety.  2) Examination of Body Systems comprised of:   Body structures and functions that impact the plan of care:      Cervical  spine, Shoulder and Thoracic Spine.   Activity limitations that impact the plan of care are:      Bathing, Cooking, Driving, Dressing, Grasping, Lifting, Reading/Computer work,   Walking, Working, Sleeping, Laying down and reaching.  3) Clinical presentation characteristics are:   Stable/Uncomplicated.  4) Decision-Making    Low complexity using standardized patient assessment instrument and/or   measureable assessment of functional outcome.  Cumulative Therapy Evaluation is: Low complexity.      Re: Nicki Guerra   :   1945    Previous and current functional limitations: (See Goal Flow Sheet for this information)    Short term and Long term goals: (See Goal Flow Sheet for this information)     Communication ability: Patient appears to be able to clearly communicate and understand verbal and written communication and follow directions correctly.  Treatment Explanation - The following has been discussed with the patient: RX ordered/plan of care  Possible risks and side effects  This patient would benefit from PT intervention to resume normal activities.   Rehab potential is good.    Frequency: 2 X week, once daily  Duration: for 8 weeks then progressing to 1x/ week for 6 weeks  Discharge Plan: Achieve all LTG.  Independent in home treatment program.        Thank you for your referral.    INQUIRIES  Therapist: Flor Ferro, PT  [facility]  [address] [address]  Phone: [phone]  Fax: [phone]

## 2019-01-04 ENCOUNTER — THERAPY VISIT (OUTPATIENT)
Dept: PHYSICAL THERAPY | Facility: CLINIC | Age: 74
End: 2019-01-04
Payer: COMMERCIAL

## 2019-01-04 DIAGNOSIS — M75.122 COMPLETE TEAR OF LEFT ROTATOR CUFF: Primary | ICD-10-CM

## 2019-01-04 DIAGNOSIS — Z98.890 STATUS POST SHOULDER SURGERY: ICD-10-CM

## 2019-01-04 PROCEDURE — 97110 THERAPEUTIC EXERCISES: CPT | Mod: GP | Performed by: PHYSICAL THERAPIST

## 2019-01-04 PROCEDURE — 97140 MANUAL THERAPY 1/> REGIONS: CPT | Mod: GP | Performed by: PHYSICAL THERAPIST

## 2019-01-07 ENCOUNTER — THERAPY VISIT (OUTPATIENT)
Dept: PHYSICAL THERAPY | Facility: CLINIC | Age: 74
End: 2019-01-07
Payer: COMMERCIAL

## 2019-01-07 DIAGNOSIS — Z98.890 S/P COMPLETE REPAIR OF ROTATOR CUFF: Primary | ICD-10-CM

## 2019-01-07 DIAGNOSIS — M75.122 COMPLETE TEAR OF LEFT ROTATOR CUFF: ICD-10-CM

## 2019-01-07 PROCEDURE — 97140 MANUAL THERAPY 1/> REGIONS: CPT | Mod: GP | Performed by: PHYSICAL THERAPIST

## 2019-01-07 PROCEDURE — 97110 THERAPEUTIC EXERCISES: CPT | Mod: GP | Performed by: PHYSICAL THERAPIST

## 2019-01-11 ENCOUNTER — THERAPY VISIT (OUTPATIENT)
Dept: PHYSICAL THERAPY | Facility: CLINIC | Age: 74
End: 2019-01-11
Payer: COMMERCIAL

## 2019-01-11 DIAGNOSIS — Z98.890 S/P COMPLETE REPAIR OF ROTATOR CUFF: Primary | ICD-10-CM

## 2019-01-11 DIAGNOSIS — M75.22 BICEPS TENDONITIS, LEFT: ICD-10-CM

## 2019-01-11 DIAGNOSIS — M19.012 ARTHROSIS OF LEFT ACROMIOCLAVICULAR JOINT: ICD-10-CM

## 2019-01-11 PROCEDURE — 97140 MANUAL THERAPY 1/> REGIONS: CPT | Mod: GP | Performed by: PHYSICAL THERAPY ASSISTANT

## 2019-01-11 PROCEDURE — 97110 THERAPEUTIC EXERCISES: CPT | Mod: GP | Performed by: PHYSICAL THERAPY ASSISTANT

## 2019-01-14 ENCOUNTER — THERAPY VISIT (OUTPATIENT)
Dept: PHYSICAL THERAPY | Facility: CLINIC | Age: 74
End: 2019-01-14
Payer: COMMERCIAL

## 2019-01-14 DIAGNOSIS — M19.012 ARTHROSIS OF LEFT ACROMIOCLAVICULAR JOINT: ICD-10-CM

## 2019-01-14 DIAGNOSIS — Z98.890 S/P COMPLETE REPAIR OF ROTATOR CUFF: Primary | ICD-10-CM

## 2019-01-14 PROCEDURE — 97110 THERAPEUTIC EXERCISES: CPT | Mod: GP | Performed by: PHYSICAL THERAPY ASSISTANT

## 2019-01-14 PROCEDURE — 97140 MANUAL THERAPY 1/> REGIONS: CPT | Mod: GP | Performed by: PHYSICAL THERAPY ASSISTANT

## 2019-01-16 ENCOUNTER — THERAPY VISIT (OUTPATIENT)
Dept: PHYSICAL THERAPY | Facility: CLINIC | Age: 74
End: 2019-01-16
Payer: COMMERCIAL

## 2019-01-16 DIAGNOSIS — Z98.890 S/P COMPLETE REPAIR OF ROTATOR CUFF: ICD-10-CM

## 2019-01-16 PROCEDURE — 97110 THERAPEUTIC EXERCISES: CPT | Mod: GP | Performed by: PHYSICAL THERAPIST

## 2019-01-16 PROCEDURE — 97140 MANUAL THERAPY 1/> REGIONS: CPT | Mod: GP | Performed by: PHYSICAL THERAPIST

## 2019-01-25 ENCOUNTER — THERAPY VISIT (OUTPATIENT)
Dept: PHYSICAL THERAPY | Facility: CLINIC | Age: 74
End: 2019-01-25
Payer: COMMERCIAL

## 2019-01-25 DIAGNOSIS — Z98.890 S/P COMPLETE REPAIR OF ROTATOR CUFF: ICD-10-CM

## 2019-01-25 PROCEDURE — 97110 THERAPEUTIC EXERCISES: CPT | Mod: GP | Performed by: PHYSICAL THERAPIST

## 2019-01-25 PROCEDURE — 97140 MANUAL THERAPY 1/> REGIONS: CPT | Mod: GP | Performed by: PHYSICAL THERAPIST

## 2019-01-25 NOTE — LETTER
[facility]THLET[facility]  [address]  [place] [facility] [address]  [phone]    2019    Re: Nicki Guerra   :   1945  MRN:  [id number]   REFERRING PHYSICIAN:   Miles Rodriguez    [facility]    Date of Initial Evaluation:  2019  Visits:     Reason for Referral:  S/P complete repair of rotator cuff    PROGRESS  REPORT  Progress reporting period is from 2019 to 2018.       SUBJECTIVE  Subjective changes noted by patient:  I have been sick for a about a week with a lot of coughing, but feeling a little better.  I still have soreness in the front of the shoulder and arm.  At times the there feels like a pinch in front or it is not in the right place.  I have difficulty relaxing.      Current Pain level: 5/10.     Previous pain level was  6/10  .   Changes in function:  Patient initially showed improved, but in the last week with being sick patient has shown a decrease in motion.  Adverse reaction to treatment or activity: activity - getting sick and coughing and possible overdoing, difficulty relaxing,    OBJECTIVE  Changes noted in objective findings:  Patient has show improvement with ROM but still remains limited  PROM flexion 70, abduction 50, ER 15, IR 50. Tightness and tenderness in upper trapezius, biceps and pectoralis major.  Poor scapular position with rounded shoulders.  Patient has difficulty relaxing.      ASSESSMENT/PLAN  Updated problem list and treatment plan: Diagnosis 1:  Left rotator cuff repair  Pain -  manual therapy, self management, education and home program  Decreased ROM/flexibility - manual therapy, therapeutic exercise, therapeutic activity and home program  Decreased joint mobility - manual therapy, therapeutic exercise, therapeutic activity and home program  Decreased function - therapeutic activities and home program  Impaired posture  - neuro re-education, therapeutic activities and home program  Evaluation ongoing  STG/LTGs have been met or progress has been made towards goals:  Yes (See Goal flow sheet completed today.)  Assessment of Progress: The patient's condition is improving.  The patient's condition has potential to improve.  Re: Nicki Guerra   :   1945    The patient has had set backs in their progress.  Self Management Plans:  Patient has been instructed in a home treatment program.  I have re-evaluated this patient and find that the nature, scope, duration and intensity of the therapy is appropriate for the medical condition of the patient.  Nicki continues to require the following intervention to meet STG and LTG's:  PT    Recommendations:  This patient would benefit from continued therapy.     Frequency:  2 X week, once daily  Duration:  for 6 weeks  Patient initially showed steady improvement with PROM,  Patient has shown increase in tightness and soreness over the last week with patient getting sick, the cold weather and possible from over doing.   Was trying to progress with AAROM but patient reports increase in tenderness in anterior shoulder and anterior arm.      Thank you for your referral.    INQUIRIES  Therapist: Flor Ferro, PT  [facility]  [address] [address]  Phone: [phone]  Fax: [phone]

## 2019-01-26 NOTE — PROGRESS NOTES
Subjective:  HPI                    Objective:  System    Physical Exam    General     ROS    Assessment/Plan:    PROGRESS  REPORT    Progress reporting period is from 1/4/2019 to 1/25/2018.       SUBJECTIVE  Subjective changes noted by patient:  I have been sick for a about a week with a lot of coughing, but feeling a little better.  I still have soreness in the front of the shoulder and arm.  At times the there feels like a pinch in front or it is not in the right place.  I have difficulty relaxing.       Current Pain level: 5/10.     Previous pain level was  6/10  .   Changes in function:  Patient initially showed improved, but in the last week with being sick patient has shown a decrease in motion.  Adverse reaction to treatment or activity: activity - getting sick and coughing and possible overdoing, difficulty relaxing,    OBJECTIVE  Changes noted in objective findings:  Patient has show improvement with ROM but still remains limited  PROM flexion 70, abduction 50, ER 15, IR 50. Tightness and tenderness in upper trapezius, biceps and pectoralis major.  Poor scapular position with rounded shoulders.  Patient has difficulty relaxing.        ASSESSMENT/PLAN  Updated problem list and treatment plan: Diagnosis 1:  Left rotator cuff repair  Pain -  manual therapy, self management, education and home program  Decreased ROM/flexibility - manual therapy, therapeutic exercise, therapeutic activity and home program  Decreased joint mobility - manual therapy, therapeutic exercise, therapeutic activity and home program  Decreased function - therapeutic activities and home program  Impaired posture - neuro re-education, therapeutic activities and home program  Evaluation ongoing  STG/LTGs have been met or progress has been made towards goals:  Yes (See Goal flow sheet completed today.)  Assessment of Progress: The patient's condition is improving.  The patient's condition has potential to improve.  The patient has had set  backs in their progress.  Self Management Plans:  Patient has been instructed in a home treatment program.  I have re-evaluated this patient and find that the nature, scope, duration and intensity of the therapy is appropriate for the medical condition of the patient.  Nicki continues to require the following intervention to meet STG and LTG's:  PT    Recommendations:  This patient would benefit from continued therapy.     Frequency:  2 X week, once daily  Duration:  for 6 weeks  Patient initially showed steady improvement with PROM,  Patient has shown increase in tightness and soreness over the last week with patient getting sick, the cold weather and possible from over doing.   Was trying to progress with AAROM but patient reports increase in tenderness in anterior shoulder and anterior arm.      Please refer to the daily flowsheet for treatment today, total treatment time and time spent performing 1:1 timed codes.

## 2019-01-28 ENCOUNTER — OFFICE VISIT (OUTPATIENT)
Dept: ORTHOPEDICS | Facility: CLINIC | Age: 74
End: 2019-01-28
Payer: COMMERCIAL

## 2019-01-28 ENCOUNTER — THERAPY VISIT (OUTPATIENT)
Dept: PHYSICAL THERAPY | Facility: CLINIC | Age: 74
End: 2019-01-28
Payer: COMMERCIAL

## 2019-01-28 VITALS
SYSTOLIC BLOOD PRESSURE: 116 MMHG | BODY MASS INDEX: 21.66 KG/M2 | HEIGHT: 65 IN | OXYGEN SATURATION: 95 % | RESPIRATION RATE: 13 BRPM | WEIGHT: 130 LBS | HEART RATE: 76 BPM | DIASTOLIC BLOOD PRESSURE: 75 MMHG

## 2019-01-28 DIAGNOSIS — Z98.890 S/P COMPLETE REPAIR OF ROTATOR CUFF: ICD-10-CM

## 2019-01-28 DIAGNOSIS — M75.122 COMPLETE TEAR OF LEFT ROTATOR CUFF: Primary | ICD-10-CM

## 2019-01-28 DIAGNOSIS — M19.012 ARTHROSIS OF LEFT ACROMIOCLAVICULAR JOINT: ICD-10-CM

## 2019-01-28 DIAGNOSIS — M25.812 SHOULDER IMPINGEMENT, LEFT: ICD-10-CM

## 2019-01-28 PROCEDURE — 97110 THERAPEUTIC EXERCISES: CPT | Mod: GP | Performed by: PHYSICAL THERAPY ASSISTANT

## 2019-01-28 PROCEDURE — 99024 POSTOP FOLLOW-UP VISIT: CPT | Performed by: ORTHOPAEDIC SURGERY

## 2019-01-28 ASSESSMENT — MIFFLIN-ST. JEOR: SCORE: 1105.56

## 2019-01-28 NOTE — LETTER
[address]  [place] [address]  Phone: [phone]    January 28, 2019        Nicki Guerra  [address]   [place] [address]          To whom it may concern:    RE: Nicki Caceres was seen today and treated in our clinic for follow-up of a left rotator cuff repair from 12/21/18. She may return to work on 2/10/19 with the following restrictions: no heavy lifting, no tugging. She will return to see us in clinic in 6 weeks. Her workability will be reassessed at that time.    Please contact me for questions or concerns.      Sincerely,        Dinesh Hermosillo MD

## 2019-01-28 NOTE — PROGRESS NOTES
Follow up left rotator cuff repair, Kezia Villalpando, biceps tenodesis on 12/21/18  She has still been using her sling.  She has not gone back to work..    She complains of stiffness.  She had a 2.5 cms tear  Pain is moderate.   Wound: healing well  Range of Motion in Physical Therapy:  Patient has shown improvement with ROM but still remains limited  PROM flexion 70, abduction 50, ER 15, IR 50. Tightness and tenderness in upper trapezius, biceps and pectoralis major.  Poor scapular position with rounded shoulders.  Patient has difficulty relaxing.      Medication renewal: None    Assessment/Plan:  left rotator cuff repair, Kezia Villalpando, biceps tenodesis with significant stiffness.  Discontinue sling.  Will continue range of motion and start rotator cuff strengthening and scapular stabilization exercises with tubing.  Return to work:  2/10/19.  Return to clinic 6 weeks.

## 2019-01-28 NOTE — LETTER
1/28/2019         RE: Nicki Guerra  [address]  [place] [address]        Dear Colleague,    Thank you for referring your patient, Nicki Guerra, to the [facility]. Please see a copy of my visit note below.    Follow up left rotator cuff repair, Kezia Villalpando, biceps tenodesis on 12/21/18  She has still been using her sling.  She has not gone back to work..    She complains of stiffness.  She had a 2.5 cms tear  Pain is moderate.   Wound: healing well  Range of Motion in Physical Therapy:  Patient has shown improvement with ROM but still remains limited  PROM flexion 70, abduction 50, ER 15, IR 50. Tightness and tenderness in upper trapezius, biceps and pectoralis major.  Poor scapular position with rounded shoulders.  Patient has difficulty relaxing.      Medication renewal: None    Assessment/Plan:  left rotator cuff repair, Kezia Villalpando, biceps tenodesis with significant stiffness.  Discontinue sling.  Will continue range of motion and start rotator cuff strengthening and scapular stabilization exercises with tubing.  Return to work:  2/10/19.  Return to clinic 6 weeks.              Again, thank you for allowing me to participate in the care of your patient.        Sincerely,        Dinesh Hermosillo MD

## 2019-01-30 ENCOUNTER — THERAPY VISIT (OUTPATIENT)
Dept: PHYSICAL THERAPY | Facility: CLINIC | Age: 74
End: 2019-01-30
Payer: COMMERCIAL

## 2019-01-30 DIAGNOSIS — Z98.890 S/P COMPLETE REPAIR OF ROTATOR CUFF: ICD-10-CM

## 2019-01-30 PROCEDURE — 97110 THERAPEUTIC EXERCISES: CPT | Mod: GP | Performed by: PHYSICAL THERAPIST

## 2019-01-30 PROCEDURE — 97140 MANUAL THERAPY 1/> REGIONS: CPT | Mod: GP | Performed by: PHYSICAL THERAPIST

## 2019-02-08 ENCOUNTER — THERAPY VISIT (OUTPATIENT)
Dept: PHYSICAL THERAPY | Facility: CLINIC | Age: 74
End: 2019-02-08
Payer: COMMERCIAL

## 2019-02-08 DIAGNOSIS — Z98.890 S/P COMPLETE REPAIR OF ROTATOR CUFF: ICD-10-CM

## 2019-02-08 PROCEDURE — 97140 MANUAL THERAPY 1/> REGIONS: CPT | Mod: GP | Performed by: PHYSICAL THERAPY ASSISTANT

## 2019-02-08 PROCEDURE — 97110 THERAPEUTIC EXERCISES: CPT | Mod: GP | Performed by: PHYSICAL THERAPY ASSISTANT

## 2019-02-11 ENCOUNTER — THERAPY VISIT (OUTPATIENT)
Dept: PHYSICAL THERAPY | Facility: CLINIC | Age: 74
End: 2019-02-11
Payer: COMMERCIAL

## 2019-02-11 DIAGNOSIS — Z98.890 S/P COMPLETE REPAIR OF ROTATOR CUFF: ICD-10-CM

## 2019-02-11 PROCEDURE — 97110 THERAPEUTIC EXERCISES: CPT | Mod: GP | Performed by: PHYSICAL THERAPIST

## 2019-02-11 PROCEDURE — 97140 MANUAL THERAPY 1/> REGIONS: CPT | Mod: GP | Performed by: PHYSICAL THERAPIST

## 2019-02-14 ENCOUNTER — THERAPY VISIT (OUTPATIENT)
Dept: PHYSICAL THERAPY | Facility: CLINIC | Age: 74
End: 2019-02-14
Payer: COMMERCIAL

## 2019-02-14 DIAGNOSIS — Z98.890 S/P COMPLETE REPAIR OF ROTATOR CUFF: ICD-10-CM

## 2019-02-14 PROCEDURE — 97140 MANUAL THERAPY 1/> REGIONS: CPT | Mod: GP | Performed by: PHYSICAL THERAPIST

## 2019-02-14 PROCEDURE — 97110 THERAPEUTIC EXERCISES: CPT | Mod: GP | Performed by: PHYSICAL THERAPIST

## 2019-02-21 ENCOUNTER — THERAPY VISIT (OUTPATIENT)
Dept: PHYSICAL THERAPY | Facility: CLINIC | Age: 74
End: 2019-02-21
Payer: COMMERCIAL

## 2019-02-21 DIAGNOSIS — Z98.890 S/P COMPLETE REPAIR OF ROTATOR CUFF: ICD-10-CM

## 2019-02-21 PROCEDURE — 97110 THERAPEUTIC EXERCISES: CPT | Mod: GP | Performed by: PHYSICAL THERAPIST

## 2019-02-21 PROCEDURE — 97140 MANUAL THERAPY 1/> REGIONS: CPT | Mod: GP | Performed by: PHYSICAL THERAPIST

## 2019-02-21 PROCEDURE — 97112 NEUROMUSCULAR REEDUCATION: CPT | Mod: GP | Performed by: PHYSICAL THERAPIST

## 2019-02-25 ENCOUNTER — THERAPY VISIT (OUTPATIENT)
Dept: PHYSICAL THERAPY | Facility: CLINIC | Age: 74
End: 2019-02-25
Payer: COMMERCIAL

## 2019-02-25 DIAGNOSIS — Z98.890 S/P COMPLETE REPAIR OF ROTATOR CUFF: ICD-10-CM

## 2019-02-25 PROCEDURE — 97110 THERAPEUTIC EXERCISES: CPT | Mod: GP | Performed by: PHYSICAL THERAPY ASSISTANT

## 2019-02-25 PROCEDURE — 97112 NEUROMUSCULAR REEDUCATION: CPT | Mod: GP | Performed by: PHYSICAL THERAPY ASSISTANT

## 2019-03-01 ENCOUNTER — THERAPY VISIT (OUTPATIENT)
Dept: PHYSICAL THERAPY | Facility: CLINIC | Age: 74
End: 2019-03-01
Payer: COMMERCIAL

## 2019-03-01 DIAGNOSIS — Z98.890 S/P COMPLETE REPAIR OF ROTATOR CUFF: ICD-10-CM

## 2019-03-01 PROCEDURE — 97112 NEUROMUSCULAR REEDUCATION: CPT | Mod: GP | Performed by: PHYSICAL THERAPY ASSISTANT

## 2019-03-01 PROCEDURE — 97110 THERAPEUTIC EXERCISES: CPT | Mod: GP | Performed by: PHYSICAL THERAPY ASSISTANT

## 2019-03-03 NOTE — PROGRESS NOTES
Subjective:  HPI                    Objective:  System    Physical Exam    General     ROS    Assessment/Plan:    PROGRESS  REPORT    Progress reporting period is from 1/25/2019 to 3/4/2019.       SUBJECTIVE  Subjective changes noted by patient:  Patient has reported significant improvement with increase in motion and function, decrease in pain.  Patient has returned to work. Pt has increased function with ADL's, ie: reaching to bottom       Current pain level is 0/10 can to intermittent up to 3-4/10 .     Previous pain level was  7/10  .   Changes in function:  Yes (See Goal flowsheet attached for changes in current functional level)  Adverse reaction to treatment or activity: None    OBJECTIVE  Changes noted in objective findings:  Yes, AROM flexion 105, abduction 115, with less shoulder hiking.  Reach across shoulder WNL,               Behind head WNL, behind back thumb reach to top of sacrum.  PROM flexion 150 end range tightness, abduction 140 end range tightness, ER  55 with end range tightness,  IR WFL.  Cueing need not to use neck and to incorporate more scapular control. Decrease in tightness in anterior chest and shoulder.   Patient has progressed to strengthening exercises able to add ounces to the supine.  Neck pain and tightness has also decreased        ASSESSMENT/PLAN  Updated problem list and treatment plan: Diagnosis 1:  Left RCR  Pain -  manual therapy, self management, education and home program  Decreased ROM/flexibility - manual therapy, therapeutic exercise, therapeutic activity and home program  Decreased joint mobility - manual therapy, therapeutic exercise, therapeutic activity and home program  Decreased strength - therapeutic exercise, therapeutic activities and home program  Impaired muscle performance - neuro re-education and home program  Decreased function - therapeutic activities and home program  STG/LTGs have been met or progress has been made towards goals:  Yes (See Goal flow  sheet completed today.)  Assessment of Progress: The patient's condition is improving.  The patient's condition has potential to improve.  Self Management Plans:  Patient has been instructed in a home treatment program.  Patient  has been instructed in self management of symptoms.  I have re-evaluated this patient and find that the nature, scope, duration and intensity of the therapy is appropriate for the medical condition of the patient.  Nicki continues to require the following intervention to meet STG and LTG's:  PT    Recommendations:  This patient would benefit from continued therapy.     Frequency:  1 X week, once daily  Duration:  for 4 weeks tapering to 1 X a week every 2 weeks for 1 month.  6 additional visits.  Patient has made significant improvement with ROM and strength.  Patient still needs guidance not to compensate with neck muscles and progression of exercises.  Thank you for the opportunity of working with this positive and motivated individual.    Please refer to the daily flowsheet for treatment today, total treatment time and time spent performing 1:1 timed codes.

## 2019-03-04 ENCOUNTER — OFFICE VISIT (OUTPATIENT)
Dept: ORTHOPEDICS | Facility: CLINIC | Age: 74
End: 2019-03-04
Payer: COMMERCIAL

## 2019-03-04 ENCOUNTER — THERAPY VISIT (OUTPATIENT)
Dept: PHYSICAL THERAPY | Facility: CLINIC | Age: 74
End: 2019-03-04
Payer: COMMERCIAL

## 2019-03-04 VITALS
DIASTOLIC BLOOD PRESSURE: 73 MMHG | OXYGEN SATURATION: 99 % | WEIGHT: 130 LBS | HEIGHT: 65 IN | HEART RATE: 102 BPM | SYSTOLIC BLOOD PRESSURE: 127 MMHG | BODY MASS INDEX: 21.66 KG/M2

## 2019-03-04 DIAGNOSIS — Z98.890 S/P COMPLETE REPAIR OF ROTATOR CUFF: ICD-10-CM

## 2019-03-04 DIAGNOSIS — Z98.890 S/P COMPLETE REPAIR OF ROTATOR CUFF: Primary | ICD-10-CM

## 2019-03-04 PROCEDURE — 97110 THERAPEUTIC EXERCISES: CPT | Mod: GP | Performed by: PHYSICAL THERAPY ASSISTANT

## 2019-03-04 PROCEDURE — 97112 NEUROMUSCULAR REEDUCATION: CPT | Mod: GP | Performed by: PHYSICAL THERAPY ASSISTANT

## 2019-03-04 PROCEDURE — 99024 POSTOP FOLLOW-UP VISIT: CPT | Performed by: ORTHOPAEDIC SURGERY

## 2019-03-04 ASSESSMENT — MIFFLIN-ST. JEOR: SCORE: 1105.56

## 2019-03-04 NOTE — LETTER
3/4/2019         RE: Nicki Guerra  [address]  [place] [address]        Dear Colleague,    Thank you for referring your patient, Nicki Guerra, to the [facility]. Please see a copy of my visit note below.    Follow up left rotator cuff repair, Kezia Villalpando, biceps tenodesis on 12/21/18.    She had no complaints.    Physical Therapy report:   Changes noted in objective findings:  Yes, AROM flexion 105, abduction 115, with less shoulder hiking.  Reach across shoulder WNL,               Behind head WNL, behind back thumb reach to top of sacrum.  PROM flexion 150 end range tightness, abduction 140 end range tightness, ER  55 with end range tightness,  IR WFL.  Cueing need not to use neck and to incorporate more scapular control. Decrease in tightness in anterior chest and shoulder.   Patient has progressed to strengthening exercises able to add ounces to the supine.  Neck pain and tightness has also decreased    Assessment/Plan:   Rotator cuff repair doing well.  Physical therapy does want her to continue with them weekly for a month, then every other week..  Will continue range of motion and strengthening with tubing.  Avoid tugging with the arm.  Return to clinic 2 months.    Again, thank you for allowing me to participate in the care of your patient.        Sincerely,        Dinesh Hermosillo MD

## 2019-03-04 NOTE — LETTER
[facility] PHYSICAL THERAPY  [address]  [place] [address]  [phone]    2019    Re: Nicki Guerra   :   1945  MRN:  [id number]   REFERRING PHYSICIAN:   Miles Rodriguez    [facility]    Date of Initial Evaluation:  2019  Visits:     Reason for Referral:  S/P complete repair of rotator cuff     PROGRESS  REPORT  Progress reporting period is from 2019 to 3/4/2019.       SUBJECTIVE  Subjective changes noted by patient: Patient has reported significant improvement with increase in motion and function, decrease in pain.  Patient has returned to work.  Pt has increased function with ADL's, ie: reaching to bottom       Current pain level is 0/10 can to intermittent up to 3-4/10 .     Previous pain level was 7/10.   Changes in function: Yes (See Goal flowsheet attached for changes in current functional level)  Adverse reaction to treatment or activity: None    OBJECTIVE  Changes noted in objective findings: Yes, AROM flexion 105, abduction 115, with less shoulder hiking.  Reach across shoulder WNL.  Behind head WNL, behind back thumb reach to top of sacrum.  PROM flexion 150 end range tightness, abduction 140 end range tightness, ER 55 with end range tightness, IR WFL.  Cueing need not to use neck and to incorporate more scapular control.  Decrease in tightness in anterior chest and shoulder.  Patient has progressed to strengthening exercises able to add ounces to the supine.  Neck pain and tightness has also decreased      ASSESSMENT/PLAN  Updated problem list and treatment plan:   Diagnosis 1: Left RCR    Pain - manual therapy, self management, education and home program  Decreased ROM/flexibility - manual therapy, therapeutic exercise, therapeutic activity and home program  Decreased joint mobility - manual therapy, therapeutic exercise, therapeutic activity and  home program  Decreased strength - therapeutic exercise, therapeutic activities and home program  Impaired muscle performance - neuro re-education and home program  Decreased function - therapeutic activities and home program  STG/LTGs have been met or progress has been made towards goals: Yes (See Goal flow sheet completed today.)  Re: Nicki Guerra   :   1945    Assessment of Progress: The patient's condition is improving.  The patient's condition has potential to improve.  Self Management Plans: Patient has been instructed in a home treatment program.  Patient has been instructed in self management of symptoms.  I have re-evaluated this patient and find that the nature, scope, duration and intensity of the therapy is appropriate for the medical condition of the patient.  Nicki continues to require the following intervention to meet STG and LTG's: PT    Recommendations:  This patient would benefit from continued therapy.     Frequency: 1 X week, once daily  Duration: for 4 weeks tapering to 1 X a week every 2 weeks for 1 month.  6 additional visits.  Patient has made significant improvement with ROM and strength.  Patient still needs guidance not to compensate with neck muscles and progression of exercises.  Thank you for the opportunity of working with this positive and motivated individual.      I have provided on-site observation of the PTA and confirm the treatment is appropriate and the plan of care is being followed.      Thank you for your referral.    INQUIRIES  Therapist: Flor Ferro, PT   [facility]  [address] [address]  Phone: [phone]  Fax: [phone]

## 2019-03-04 NOTE — PROGRESS NOTES
I have provided on-site observation of the PTA and confirm the treatment is appropriate and the plan of care is being followed.

## 2019-03-04 NOTE — PROGRESS NOTES
Follow up left rotator cuff repair, Kezia Villalpando, biceps tenodesis on 12/21/18.    She had no complaints.    Physical Therapy report:   Changes noted in objective findings:  Yes, AROM flexion 105, abduction 115, with less shoulder hiking.  Reach across shoulder WNL,               Behind head WNL, behind back thumb reach to top of sacrum.  PROM flexion 150 end range tightness, abduction 140 end range tightness, ER  55 with end range tightness,  IR WFL.  Cueing need not to use neck and to incorporate more scapular control. Decrease in tightness in anterior chest and shoulder.   Patient has progressed to strengthening exercises able to add ounces to the supine.  Neck pain and tightness has also decreased    Assessment/Plan:   Rotator cuff repair doing well.  Physical therapy does want her to continue with them weekly for a month, then every other week..  Will continue range of motion and strengthening with tubing.  Avoid tugging with the arm.  Return to clinic 2 months.

## 2019-03-11 ENCOUNTER — THERAPY VISIT (OUTPATIENT)
Dept: PHYSICAL THERAPY | Facility: CLINIC | Age: 74
End: 2019-03-11
Payer: COMMERCIAL

## 2019-03-11 DIAGNOSIS — Z98.890 S/P COMPLETE REPAIR OF ROTATOR CUFF: ICD-10-CM

## 2019-03-11 PROCEDURE — 97112 NEUROMUSCULAR REEDUCATION: CPT | Mod: GP | Performed by: PHYSICAL THERAPIST

## 2019-03-11 PROCEDURE — 97140 MANUAL THERAPY 1/> REGIONS: CPT | Mod: GP | Performed by: PHYSICAL THERAPIST

## 2019-03-11 PROCEDURE — 97110 THERAPEUTIC EXERCISES: CPT | Mod: GP | Performed by: PHYSICAL THERAPIST

## 2019-03-18 ENCOUNTER — THERAPY VISIT (OUTPATIENT)
Dept: PHYSICAL THERAPY | Facility: CLINIC | Age: 74
End: 2019-03-18
Payer: COMMERCIAL

## 2019-03-18 DIAGNOSIS — Z98.890 S/P COMPLETE REPAIR OF ROTATOR CUFF: ICD-10-CM

## 2019-03-18 PROCEDURE — 97112 NEUROMUSCULAR REEDUCATION: CPT | Mod: GP | Performed by: PHYSICAL THERAPY ASSISTANT

## 2019-03-18 PROCEDURE — 97140 MANUAL THERAPY 1/> REGIONS: CPT | Mod: GP | Performed by: PHYSICAL THERAPY ASSISTANT

## 2019-03-18 PROCEDURE — 97110 THERAPEUTIC EXERCISES: CPT | Mod: GP | Performed by: PHYSICAL THERAPY ASSISTANT

## 2019-03-25 ENCOUNTER — THERAPY VISIT (OUTPATIENT)
Dept: PHYSICAL THERAPY | Facility: CLINIC | Age: 74
End: 2019-03-25
Payer: COMMERCIAL

## 2019-03-25 DIAGNOSIS — Z98.890 S/P COMPLETE REPAIR OF ROTATOR CUFF: ICD-10-CM

## 2019-03-25 PROCEDURE — 97110 THERAPEUTIC EXERCISES: CPT | Mod: GP | Performed by: PHYSICAL THERAPY ASSISTANT

## 2019-03-25 PROCEDURE — 97112 NEUROMUSCULAR REEDUCATION: CPT | Mod: GP | Performed by: PHYSICAL THERAPY ASSISTANT

## 2019-04-08 ENCOUNTER — THERAPY VISIT (OUTPATIENT)
Dept: PHYSICAL THERAPY | Facility: CLINIC | Age: 74
End: 2019-04-08
Payer: COMMERCIAL

## 2019-04-08 DIAGNOSIS — Z98.890 S/P COMPLETE REPAIR OF ROTATOR CUFF: ICD-10-CM

## 2019-04-08 PROCEDURE — 97140 MANUAL THERAPY 1/> REGIONS: CPT | Mod: GP | Performed by: PHYSICAL THERAPIST

## 2019-04-08 PROCEDURE — 97110 THERAPEUTIC EXERCISES: CPT | Mod: GP | Performed by: PHYSICAL THERAPIST

## 2019-04-08 PROCEDURE — 97112 NEUROMUSCULAR REEDUCATION: CPT | Mod: GP | Performed by: PHYSICAL THERAPIST

## 2019-04-22 ENCOUNTER — THERAPY VISIT (OUTPATIENT)
Dept: PHYSICAL THERAPY | Facility: CLINIC | Age: 74
End: 2019-04-22
Payer: COMMERCIAL

## 2019-04-22 DIAGNOSIS — Z98.890 S/P COMPLETE REPAIR OF ROTATOR CUFF: ICD-10-CM

## 2019-04-22 PROCEDURE — 97140 MANUAL THERAPY 1/> REGIONS: CPT | Mod: GP | Performed by: PHYSICAL THERAPY ASSISTANT

## 2019-04-22 PROCEDURE — 97110 THERAPEUTIC EXERCISES: CPT | Mod: GP | Performed by: PHYSICAL THERAPY ASSISTANT

## 2019-04-22 PROCEDURE — 97112 NEUROMUSCULAR REEDUCATION: CPT | Mod: GP | Performed by: PHYSICAL THERAPY ASSISTANT

## 2019-04-29 ENCOUNTER — THERAPY VISIT (OUTPATIENT)
Dept: PHYSICAL THERAPY | Facility: CLINIC | Age: 74
End: 2019-04-29
Payer: COMMERCIAL

## 2019-04-29 DIAGNOSIS — Z98.890 S/P COMPLETE REPAIR OF ROTATOR CUFF: ICD-10-CM

## 2019-04-29 PROCEDURE — 97112 NEUROMUSCULAR REEDUCATION: CPT | Mod: GP | Performed by: PHYSICAL THERAPY ASSISTANT

## 2019-04-29 PROCEDURE — 97110 THERAPEUTIC EXERCISES: CPT | Mod: GP | Performed by: PHYSICAL THERAPY ASSISTANT

## 2019-04-29 NOTE — LETTER
[facility] PHYSICAL THERAPY  [address]  [place] [address]  [phone]    May 6, 2019    Re: Nicki Guerra   :   1945  MRN:  [id number]   REFERRING PHYSICIAN:   Dinesh Hermosillo    [facility] PHYSICAL THERAP[facility]    Date of Initial Evaluation: 2019  Visits:     Reason for Referral:  S/P complete repair of rotator cuff    DISCHARGE REPORT  Progress reporting period is from 19 to 19.      SUBJECTIVE  Subjective changes noted by patient:    Pt has been seen for 21 PT sessions, pt reporting 100% improved with ADL's. No pain compaints, will have intermittent soreness in AM, performance of stretching exercises abolishes symptoms. Pt using L UE more often with tasks/lifting objects.    Current Pain level: 0/10    Previous pain level was:  6/10     Changes in function:  Yes (See Goal flowsheet attached for changes in current functional level)     Adverse reaction to treatment or activity: None     OBJECTIVE  Changes noted in objective findings:  Yes, increased ROM, strength and function.  L shld AROM: flex 140, abd 140, ext+IR reach to T5, able to reach behind head and across body.   MMT strength L UE: flex 4+/5, ext 5/5, IR 5/5, ER 4+/5, abd 4+/5.   Improved quality with scapular control and posture.   Spadi has improved from 92-8%.      ASSESSMENT/PLAN  Updated problem list and treatment plan: Diagnosis 1:  L RC repair  Pain -  self management  Decreased ROM/flexibility - home program  Decreased strength - home program  Decreased function - home program  STG/LTGs have been met:  Yes (See Goal flow sheet completed today.)  Progress toward STG/LTGs have been made:  Yes (See Goal flow sheet completed today.)  Assessment of Progress: The patient's condition is improving.  The patient has met all of their long term goals.  Self Management Plans:  Patient is independent in a home treatment  program.  Patient is independent in self management of symptoms.  Re: Nicki Guerra   :   1945    I have re-evaluated this patient and find that the nature, scope, duration and intensity of the therapy is appropriate for the medical condition of the patient.  Nicki continues to require the following intervention to meet STG and LT's:  PT intervention is no longer required to meet STG/LTG.    Recommendations:  This patient is ready to be discharged from therapy and continue their home treatment program.  The progress note/discharge summary was written in collaboration with and reviewed by the physical therapist.    Thank you for your referral.    INQUIRIES  Therapist: Eliana Stanford PT  [facility]  [address] [address]  Phone: [phone]  Fax: [phone]

## 2019-04-29 NOTE — PROGRESS NOTES
DISCHARGE REPORT    Progress reporting period is from 1/2/19 to 4/29/19.      SUBJECTIVE  Subjective changes noted by patient:    Pt has been seen for 21 PT sessions, pt reporting 100% improved with ADL's. No pain compaints, will have intermittent soreness in AM, performance of stretching exercises abolishes symptoms. Pt using L UE more often with tasks/lifting objects.    Current Pain level: 0/10    Previous pain level was:  6/10     Changes in function:  Yes (See Goal flowsheet attached for changes in current functional level)     Adverse reaction to treatment or activity: None     OBJECTIVE  Changes noted in objective findings:  Yes, increased ROM, strength and function.  L shld AROM: flex 140, abd 140, ext+IR reach to T5, able to reach behind head and across body.   MMT strength L UE: flex 4+/5, ext 5/5, IR 5/5, ER 4+/5, abd 4+/5.   Improved quality with scapular control and posture.   Spadi has improved from 92-8%.

## 2019-05-03 PROBLEM — Z98.890 S/P COMPLETE REPAIR OF ROTATOR CUFF: Status: RESOLVED | Noted: 2018-12-31 | Resolved: 2019-04-29

## 2019-05-03 NOTE — PROGRESS NOTES
ASSESSMENT/PLAN  Updated problem list and treatment plan: Diagnosis 1:  L RC repair  Pain -  self management  Decreased ROM/flexibility - home program  Decreased strength - home program  Decreased function - home program  STG/LTGs have been met:  Yes (See Goal flow sheet completed today.)  Progress toward STG/LTGs have been made:  Yes (See Goal flow sheet completed today.)  Assessment of Progress: The patient's condition is improving.  The patient has met all of their long term goals.  Self Management Plans:  Patient is independent in a home treatment program.  Patient is independent in self management of symptoms.  I have re-evaluated this patient and find that the nature, scope, duration and intensity of the therapy is appropriate for the medical condition of the patient.  Nicki continues to require the following intervention to meet STG and LT's:  PT intervention is no longer required to meet STG/LTG.    Recommendations:  This patient is ready to be discharged from therapy and continue their home treatment program.  The progress note/discharge summary was written in collaboration with and reviewed by the physical therapist.    Please refer to the daily flowsheet for treatment today, total treatment time and time spent performing 1:1 timed codes.

## 2019-05-20 ENCOUNTER — OFFICE VISIT (OUTPATIENT)
Dept: ORTHOPEDICS | Facility: CLINIC | Age: 74
End: 2019-05-20
Payer: COMMERCIAL

## 2019-05-20 VITALS
HEART RATE: 62 BPM | WEIGHT: 130 LBS | DIASTOLIC BLOOD PRESSURE: 77 MMHG | HEIGHT: 65 IN | OXYGEN SATURATION: 99 % | SYSTOLIC BLOOD PRESSURE: 133 MMHG | RESPIRATION RATE: 13 BRPM | BODY MASS INDEX: 21.66 KG/M2

## 2019-05-20 DIAGNOSIS — Z98.890 S/P COMPLETE REPAIR OF ROTATOR CUFF: ICD-10-CM

## 2019-05-20 DIAGNOSIS — M25.812 SHOULDER IMPINGEMENT, LEFT: ICD-10-CM

## 2019-05-20 DIAGNOSIS — M75.122 COMPLETE TEAR OF LEFT ROTATOR CUFF: Primary | ICD-10-CM

## 2019-05-20 PROCEDURE — 99212 OFFICE O/P EST SF 10 MIN: CPT | Performed by: ORTHOPAEDIC SURGERY

## 2019-05-20 ASSESSMENT — MIFFLIN-ST. JEOR: SCORE: 1100.56

## 2019-05-20 NOTE — PROGRESS NOTES
Follow up left rotator cuff repair, Kezia Villalpando, biceps tenodesis on 12/21/18.    She had no complaints.      She has nearly full range of motion.  She has equal internal rotation, although she previously was better on left.  She has equal external rotation.  Lacks 15 degrees of flexion on left compared to right.  No pain with resisted internal rotation, external rotation, abduction.  Lifting jug of fabric softener (3-4 pounds) without difficulty.    Assessment/Plan:   Rotator cuff repair doing well.  Will continue range of motion and strengthening with tubing with home exercise program .  Avoid tugging with the arm.  Return to clinic as needed.  Resume activity as tolerated.

## 2019-05-20 NOTE — LETTER
5/20/2019         RE: Nicki Guerra  [address]  [place] [address]        Dear Colleague,    Thank you for referring your patient, Nicki Guerra, to the [facility]. Please see a copy of my visit note below.    Follow up left rotator cuff repair, Kezia Villalpando, biceps tenodesis on 12/21/18.    She had no complaints.      She has nearly full range of motion.  She has equal internal rotation, although she previously was better on left.  She has equal external rotation.  Lacks 15 degrees of flexion on left compared to right.  No pain with resisted internal rotation, external rotation, abduction.  Lifting jug of fabric softener (3-4 pounds) without difficulty.    Assessment/Plan:   Rotator cuff repair doing well.  Will continue range of motion and strengthening with tubing with home exercise program .  Avoid tugging with the arm.  Return to clinic as needed.  Resume activity as tolerated.    Again, thank you for allowing me to participate in the care of your patient.        Sincerely,        Dinesh Hermosillo MD

## 2019-08-14 ENCOUNTER — ANCILLARY PROCEDURE (OUTPATIENT)
Dept: GENERAL RADIOLOGY | Facility: CLINIC | Age: 74
End: 2019-08-14
Attending: NURSE PRACTITIONER
Payer: COMMERCIAL

## 2019-08-14 ENCOUNTER — TELEPHONE (OUTPATIENT)
Dept: FAMILY MEDICINE | Facility: CLINIC | Age: 74
End: 2019-08-14

## 2019-08-14 ENCOUNTER — OFFICE VISIT (OUTPATIENT)
Dept: FAMILY MEDICINE | Facility: CLINIC | Age: 74
End: 2019-08-14
Payer: COMMERCIAL

## 2019-08-14 VITALS
BODY MASS INDEX: 22.82 KG/M2 | HEART RATE: 62 BPM | TEMPERATURE: 98.2 F | OXYGEN SATURATION: 97 % | SYSTOLIC BLOOD PRESSURE: 131 MMHG | HEIGHT: 65 IN | WEIGHT: 137 LBS | DIASTOLIC BLOOD PRESSURE: 69 MMHG | RESPIRATION RATE: 20 BRPM

## 2019-08-14 DIAGNOSIS — M79.645 PAIN IN FINGER OF LEFT HAND: Primary | ICD-10-CM

## 2019-08-14 DIAGNOSIS — F32.1 MODERATE MAJOR DEPRESSION (H): ICD-10-CM

## 2019-08-14 DIAGNOSIS — S62.610A CLOSED DISPLACED FRACTURE OF PROXIMAL PHALANX OF RIGHT INDEX FINGER, INITIAL ENCOUNTER: Primary | ICD-10-CM

## 2019-08-14 PROCEDURE — 99214 OFFICE O/P EST MOD 30 MIN: CPT | Performed by: NURSE PRACTITIONER

## 2019-08-14 PROCEDURE — 73130 X-RAY EXAM OF HAND: CPT | Mod: LT

## 2019-08-14 ASSESSMENT — MIFFLIN-ST. JEOR: SCORE: 1132.31

## 2019-08-14 ASSESSMENT — ANXIETY QUESTIONNAIRES
3. WORRYING TOO MUCH ABOUT DIFFERENT THINGS: SEVERAL DAYS
6. BECOMING EASILY ANNOYED OR IRRITABLE: SEVERAL DAYS
1. FEELING NERVOUS, ANXIOUS, OR ON EDGE: SEVERAL DAYS
IF YOU CHECKED OFF ANY PROBLEMS ON THIS QUESTIONNAIRE, HOW DIFFICULT HAVE THESE PROBLEMS MADE IT FOR YOU TO DO YOUR WORK, TAKE CARE OF THINGS AT HOME, OR GET ALONG WITH OTHER PEOPLE: NOT DIFFICULT AT ALL
7. FEELING AFRAID AS IF SOMETHING AWFUL MIGHT HAPPEN: SEVERAL DAYS
2. NOT BEING ABLE TO STOP OR CONTROL WORRYING: SEVERAL DAYS
GAD7 TOTAL SCORE: 6
5. BEING SO RESTLESS THAT IT IS HARD TO SIT STILL: NOT AT ALL

## 2019-08-14 ASSESSMENT — PAIN SCALES - GENERAL: PAINLEVEL: MODERATE PAIN (5)

## 2019-08-14 ASSESSMENT — PATIENT HEALTH QUESTIONNAIRE - PHQ9
SUM OF ALL RESPONSES TO PHQ QUESTIONS 1-9: 6
5. POOR APPETITE OR OVEREATING: SEVERAL DAYS

## 2019-08-14 NOTE — RESULT ENCOUNTER NOTE
Please call and advise that finger xray read by radiologist shows a fracture- follow up with orthopedics as soon as possible  They have a walk in clinic at  or let us know where he would like to follow up- should be seen by ortho tomorrow

## 2019-08-14 NOTE — PROGRESS NOTES
Subjective     Nicki Guerra is a 74 year old female who presents to clinic today for the following health issues:    HPI   Joint Pain    Onset: 8-3-19    Description:   Location: LT hand index finger  Character: Dull ache    Intensity: mild    Progression of Symptoms: same    Accompanying Signs & Symptoms:  Other symptoms: swelling    History:   Previous similar pain: no       Precipitating factors:   Trauma or overuse: YES- fell and put body weight on that hand    Alleviating factors:  Improved by: ice    Therapies Tried and outcome: ice helped a little    Both shoulders were sore, didn't hit her head. Left hand sore but index finger very swollen and sore an hour after falling. Still sore and swollen. She decreased activity after fall due to left shoulder surgery in December.     Mood: fluoxetine at a good level, feeling alone. Does see psychiatrist, he is retiring. Saw him since 2011, she will transfer her script to PCP.         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     Port wine stain on L posterior upper arm     Biceps tendonitis, left     Complete tear of left rotator cuff     Arthrosis of left acromioclavicular joint     Shoulder impingement, left     S/P complete repair of rotator cuff     Past Surgical History:   Procedure Laterality Date     ARTHROTOMY SHOULDER, ROTATOR CUFF REPAIR, COMBINED Left 12/21/2018    Procedure: LEFT ROTATOR CUFF REPAIR WITH DISTAL CLAVICLE EXCISION, ACROMIOPLASTY AND BICEPS TENODESIS;  Surgeon: Dinesh Hermosillo MD;  Location: [facility] OR      DILATION/CURETTAGE DIAG/THER NON OB  1969     rotator cuff repair Left 12/21/2018    Dinesh Hermosillo MD at [place] [facility]       Social History     Tobacco Use     Smoking status: Former Smoker     Smokeless tobacco: Never Used     Tobacco comment: quit 28 yrs ago   Substance Use Topics     Alcohol use: No     Family History   Problem Relation Age of Onset     Hypertension Father      Respiratory Father         EMPHYSEMA     Depression Father      Eye Disorder Father         GLAUCOMA, CATARACTS     Gastrointestinal Disease Father         ULCER     Cardiovascular Father         HEART STENTS     C.A.D. Father         AGE 84, STENTS     Cancer Father         kidney     Breast Cancer Maternal Grandmother      Prostate Cancer Paternal Grandfather      Alzheimer Disease Mother      Arthritis Mother      Depression Mother      Psychotic Disorder Brother         SCHITZOPHRENIA     Breast Cancer Other      Gastrointestinal Disease Brother         REFLUX     Gastrointestinal Disease Brother      Bladder Cancer Brother      Alzheimer Disease Brother         early onset     Cancer - colorectal No family hx of      Thyroid Disease No family hx of      Anesthesia Reaction No family hx of          Current Outpatient Medications   Medication Sig Dispense Refill     CALCIUM + D OR Take 600 mg by mouth        FLUoxetine (PROZAC) 20 MG capsule TAKE ONE CAPSULE BY MOUTH EVERY MORNING  0     MULTIVITAMINS OR 1 daily       VITAMIN C CR 1000 MG OR TBCR 1 TABLET DAILY       Allergies   Allergen Reactions     Ibuprofen Swelling     Penicillins Hives       Reviewed and updated as needed this visit by Provider  Tobacco  Allergies  Meds  Problems  Med Hx  Surg Hx  Fam Hx         Review of Systems   ROS COMP: MS as above, CV, Resp, Psych      Objective    /69 (BP Location: Right arm, Patient Position: Sitting, Cuff Size: Adult Regular)   Pulse 62   Temp 98.2  F (36.8  C) (Oral)   Resp 20   Ht 1.651 m (5' 5\")   Wt 62.1 kg (137 lb)   SpO2 97%   BMI 22.80 kg/m    Body mass index is 22.8 kg/m .  Physical Exam   GENERAL: healthy, alert and " no distress  MS: no gross musculoskeletal defects noted, no edema  CV: normal S1S2, no murmur or gallops  RESP: CLS, no wheezing or crackles  PSYCH normal affect, normal mood, no SI    Diagnostic Test Results:  Labs reviewed in Epic  No results found for this or any previous visit (from the past 24 hour(s)).        Assessment & Plan     1. Pain in finger of left hand  X-ray appears negative, will send [facility] message with final report.  Continue to ice, use Tylenol as needed for pain and swelling  - XR Hand Left G/E 3 Views    2. Moderate major depression (H)  States mood is stable on fluoxetine 20 mg daily.  Her psychiatrist is retiring and she will transfer her care to her PCP for refilling this.  She will have that report sent over when she gets a chance        Return in about 3 months (around 11/14/2019) for Routine Visit.    JARED Barnhart, NP-C  [facility]    This chart was documented by provider using a voice activated software called Dragon in addition to manual typing. There may be vocabulary errors or other grammatical errors due to this.

## 2019-08-14 NOTE — TELEPHONE ENCOUNTER
Informed pt of message below and gave her the fridley # to schedule an appt just in case they do not answer

## 2019-08-14 NOTE — TELEPHONE ENCOUNTER
Spoke with patient on the phone and gave her results.    She would like to see Dr. Hermosillo which is a [place] orthopedic provider.    She would like the referral put in with that provider for a [place] location.    Patient would like to be called after referral is placed.    Catie Barrientos RN

## 2019-08-15 ENCOUNTER — OFFICE VISIT (OUTPATIENT)
Dept: ORTHOPEDICS | Facility: CLINIC | Age: 74
End: 2019-08-15
Payer: COMMERCIAL

## 2019-08-15 VITALS
DIASTOLIC BLOOD PRESSURE: 80 MMHG | SYSTOLIC BLOOD PRESSURE: 128 MMHG | OXYGEN SATURATION: 98 % | HEART RATE: 55 BPM | BODY MASS INDEX: 22.8 KG/M2 | WEIGHT: 137 LBS

## 2019-08-15 DIAGNOSIS — S62.611A CLOSED DISPLACED FRACTURE OF PROXIMAL PHALANX OF LEFT INDEX FINGER, INITIAL ENCOUNTER: Primary | ICD-10-CM

## 2019-08-15 PROCEDURE — 99213 OFFICE O/P EST LOW 20 MIN: CPT | Performed by: ORTHOPAEDIC SURGERY

## 2019-08-15 ASSESSMENT — PAIN SCALES - GENERAL: PAINLEVEL: SEVERE PAIN (6)

## 2019-08-15 ASSESSMENT — ANXIETY QUESTIONNAIRES: GAD7 TOTAL SCORE: 6

## 2019-08-15 NOTE — LETTER
8/15/2019         RE: Nicki Guerra  [address]  [facility] [address]        Dear Colleague,    Thank you for referring your patient, Nicki Guerra, to the [facility] [place]. Please see a copy of my visit note below.    Chief Complaint:   Chief Complaint   Patient presents with     Left Index Finger - Fracture     DOI 08/03/19.  Pt fell and fractured left index finger.  Pt was taking Tylenol for pain and ice.      Nicki Guerra is seen today in the [facility] for evaluation of left index finger injury at the request of Janet Dietrich PA-C      HPI: Nicki Guerra is a 74 year old female , right -hand dominant, who presents for evaluation and management of a left  index finger injury. She injured her hand 8/3/2019 while walking in [facility] and tripped over the edge of a raised sidewalk. Fell and injured left hand, scuffed her knees. Didn't seek treatment right away but then noticed pain, bruising. Was seen in primary care clinic 8/14/2019 with xrays noted for a fracture.  It has been 12 days since the initial injury. Takes tylenol for pain. Has been icing. Has been using her hand. No splint.      She reports having moderate pain/discomfort around the injury site. She denies numbness or tingling. She denies any other injuries to her upper extremity.     Symptoms: pain, swelling.  Location: left index finger.  Pain severity: 4-6/10  Pain quality: aching  Frequency of symptoms: are constant.  Aggravating factors: activities.  Relieving factors: rest.    Previous treatment: tylenol, ice.    Past medical history:  has a past medical history of Allergic rhinitis, cause unspecified, Depressive disorder, not elsewhere classified, Enthesopathy of hip region, Family history of psychiatric condition, Head injury, unspecified, Hypotension, unspecified, Lateral epicondylitis  of elbow, Premenopausal menorrhagia, and Sciatica.   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     Medications:   Current Outpatient Medications:      CALCIUM + D OR, Take 600 mg by mouth , Disp: , Rfl:      FLUoxetine (PROZAC) 20 MG capsule, TAKE ONE CAPSULE BY MOUTH EVERY MORNING, Disp: , Rfl: 0     LUTEIN PO, , Disp: , Rfl:      MULTIVITAMINS OR, 1 daily, Disp: , Rfl:      Omega-3 Fatty Acids (OMEGA 3 PO), , Disp: , Rfl:      VITAMIN C CR 1000 MG OR TBCR, 1 TABLET DAILY, Disp: , Rfl:       Allergies:     Allergies   Allergen Reactions     Ibuprofen Swelling     Penicillins Hives        Family History: family history includes Alzheimer Disease in her brother and mother; Arthritis in her mother; Bladder Cancer in her brother; Breast Cancer in her maternal grandmother and another family member; C.A.D. in her father; Cancer in her father; Cardiovascular in her father; Depression in her father and mother; Eye Disorder in her father; Gastrointestinal Disease in her brother, brother, and father; Hypertension in her father; Prostate Cancer in her paternal grandfather; Psychotic Disorder in her brother; Respiratory in her father.     Social History:  reports that she has quit smoking. She has never used smokeless tobacco. She  reports that she does not drink alcohol or use drugs.    Review of Systems:  ROS: 10 point ROS neg other than the symptoms noted above in the HPI and past medical history.    Physical Exam  GENERAL APPEARANCE: healthy, alert, no distress.   SKIN: no suspicious lesions or rashes  NEURO: Normal strength and tone, mentation intact and speech normal  PSYCH:  mentation appears normal and affect normal. Not anxious.  RESPIRATORY: No increased work of breathing.    /80 (BP Location: Left arm, Patient Position: Sitting, Cuff Size: Adult Regular)   Pulse 55   Wt 62.1 kg (137 lb)   SpO2 98%   BMI 22.80 kg/m        left HAND EXAM:      Skin intact. No open wounds.  There is mild swelling in the index finger, metacarpal phalangeal joint.  There is mild tenderness in the proximal phalanx, metacarpal phalangeal joint of the index finger.  There is no ecchymosis.  There is no erythema of the surrounding skin.  There is no maceration of the skin.  There is no gross deformity in the area.  Range of motion: near full fist, and (any)movements are mildly painful.  Intact sensation median, radial, ulnar nerves of the hand, and intact sensation to light touch radial and ulnar digital nerves to fingers.  Brisk capillary refill to all fingers.   Palpable radial pulse, 2+  Intact epl fpl fdp fds edc wrist flexion/extenion biceps triceps deltoid.    X-rays:  3 views left index finger from 8/14/2019 were reviewed personally in clinic today. On my review of the Xrays, there is an impacted proximal phalangeal proximal metaphyseal transverse fracture with mild dorsal ulnar impaction of the index finger. No intra-articular extension is visible. Osteopenia      Impression:  73yo RHD female with left index finger proximal phalanx fracture.    Plan:  * images reviewed showing fracture, this can be treated nonsurgically. Will take 4-6 weeks to heal.  * jordy tape for protection  * light activities with hand ok  * rest  * ice  * activity  modification  * work on range of motion  * reassess 3 weeks      Sukhjinder Sommers M.D., M.S.  Dept. of Orthopaedic Surgery  [facility]    Again, thank you for allowing me to participate in the care of your patient.        Sincerely,        Sukhjinder Sommers MD

## 2019-08-19 NOTE — PROGRESS NOTES
Chief Complaint:   Chief Complaint   Patient presents with     Left Index Finger - Fracture     DOI 08/03/19.  Pt fell and fractured left index finger.  Pt was taking Tylenol for pain and ice.      Nicki Guerra is seen today in the [facility] for evaluation of left index finger injury at the request of Janet Dietrich PA-C      HPI: Nicki Guerra is a 74 year old female , right -hand dominant, who presents for evaluation and management of a left  index finger injury. She injured her hand 8/3/2019 while walking in [facility] and tripped over the edge of a raised sidewalk. Fell and injured left hand, scuffed her knees. Didn't seek treatment right away but then noticed pain, bruising. Was seen in primary care clinic 8/14/2019 with xrays noted for a fracture.  It has been 12 days since the initial injury. Takes tylenol for pain. Has been icing. Has been using her hand. No splint.      She reports having moderate pain/discomfort around the injury site. She denies numbness or tingling. She denies any other injuries to her upper extremity.     Symptoms: pain, swelling.  Location: left index finger.  Pain severity: 4-6/10  Pain quality: aching  Frequency of symptoms: are constant.  Aggravating factors: activities.  Relieving factors: rest.    Previous treatment: tylenol, ice.    Past medical history:  has a past medical history of Allergic rhinitis, cause unspecified, Depressive disorder, not elsewhere classified, Enthesopathy of hip region, Family history of psychiatric condition, Head injury, unspecified, Hypotension, unspecified, Lateral epicondylitis  of elbow, Premenopausal menorrhagia, and Sciatica.   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     Medications:   Current Outpatient Medications:      CALCIUM + D OR, Take 600 mg by mouth , Disp: , Rfl:      FLUoxetine (PROZAC) 20 MG capsule, TAKE ONE CAPSULE BY MOUTH EVERY MORNING, Disp: , Rfl: 0     LUTEIN PO, , Disp: , Rfl:      MULTIVITAMINS OR, 1 daily, Disp: , Rfl:      Omega-3 Fatty Acids (OMEGA 3 PO), , Disp: , Rfl:      VITAMIN C CR 1000 MG OR TBCR, 1 TABLET DAILY, Disp: , Rfl:       Allergies:     Allergies   Allergen Reactions     Ibuprofen Swelling     Penicillins Hives        Family History: family history includes Alzheimer Disease in her brother and mother; Arthritis in her mother; Bladder Cancer in her brother; Breast Cancer in her maternal grandmother and another family member; C.A.D. in her father; Cancer in her father; Cardiovascular in her father; Depression in her father and mother; Eye Disorder in her father; Gastrointestinal Disease in her brother, brother, and father; Hypertension in her father; Prostate Cancer in her paternal grandfather; Psychotic Disorder in her brother; Respiratory in her father.     Social History:  reports that she has quit smoking. She has never used smokeless tobacco. She reports that she does not drink alcohol or use drugs.    Review of Systems:  ROS: 10 point ROS neg other than the symptoms noted above in the HPI and past medical history.    Physical Exam  GENERAL APPEARANCE: healthy, alert, no distress.   SKIN: no suspicious lesions or  rashes  NEURO: Normal strength and tone, mentation intact and speech normal  PSYCH:  mentation appears normal and affect normal. Not anxious.  RESPIRATORY: No increased work of breathing.    /80 (BP Location: Left arm, Patient Position: Sitting, Cuff Size: Adult Regular)   Pulse 55   Wt 62.1 kg (137 lb)   SpO2 98%   BMI 22.80 kg/m       left HAND EXAM:      Skin intact. No open wounds.  There is mild swelling in the index finger, metacarpal phalangeal joint.  There is mild tenderness in the proximal phalanx, metacarpal phalangeal joint of the index finger.  There is no ecchymosis.  There is no erythema of the surrounding skin.  There is no maceration of the skin.  There is no gross deformity in the area.  Range of motion: near full fist, and (any)movements are mildly painful.  Intact sensation median, radial, ulnar nerves of the hand, and intact sensation to light touch radial and ulnar digital nerves to fingers.  Brisk capillary refill to all fingers.   Palpable radial pulse, 2+  Intact epl fpl fdp fds edc wrist flexion/extenion biceps triceps deltoid.    X-rays:  3 views left index finger from 8/14/2019 were reviewed personally in clinic today. On my review of the Xrays, there is an impacted proximal phalangeal proximal metaphyseal transverse fracture with mild dorsal ulnar impaction of the index finger. No intra-articular extension is visible. Osteopenia      Impression:  75yo RHD female with left index finger proximal phalanx fracture.    Plan:  * images reviewed showing fracture, this can be treated nonsurgically. Will take 4-6 weeks to heal.  * jordy tape for protection  * light activities with hand ok  * rest  * ice  * activity modification  * work on range of motion  * reassess 3 weeks      Sukhjinder Sommers M.D., M.S.  Dept. of Orthopaedic Surgery  [facility]

## 2019-09-09 ENCOUNTER — OFFICE VISIT (OUTPATIENT)
Dept: ORTHOPEDICS | Facility: CLINIC | Age: 74
End: 2019-09-09
Payer: COMMERCIAL

## 2019-09-09 VITALS
BODY MASS INDEX: 22.82 KG/M2 | HEIGHT: 65 IN | RESPIRATION RATE: 16 BRPM | SYSTOLIC BLOOD PRESSURE: 153 MMHG | HEART RATE: 90 BPM | DIASTOLIC BLOOD PRESSURE: 92 MMHG | WEIGHT: 137 LBS

## 2019-09-09 DIAGNOSIS — S46.092D OTHER INJURY OF MUSCLE(S) AND TENDON(S) OF THE ROTATOR CUFF OF LEFT SHOULDER, SUBSEQUENT ENCOUNTER: ICD-10-CM

## 2019-09-09 DIAGNOSIS — S62.611A CLOSED DISPLACED FRACTURE OF PROXIMAL PHALANX OF LEFT INDEX FINGER, INITIAL ENCOUNTER: Primary | ICD-10-CM

## 2019-09-09 DIAGNOSIS — Z98.890 S/P COMPLETE REPAIR OF ROTATOR CUFF: ICD-10-CM

## 2019-09-09 PROCEDURE — 99213 OFFICE O/P EST LOW 20 MIN: CPT | Performed by: ORTHOPAEDIC SURGERY

## 2019-09-09 ASSESSMENT — MIFFLIN-ST. JEOR: SCORE: 1132.31

## 2019-09-09 NOTE — PROGRESS NOTES
"HISTORY OF PRESENT ILLNESS:    Nicki Guerra is a 74 year old female who is seen in follow up for   Chief Complaint   Patient presents with     RECHECK     Left RCR NM biceps tenodesis on 12/21/18. Fell on 8/3/19.   Present symptoms: Patient reports a fall on 8/3/19 in which she fell forward on outstretched hands and hit one knee.  She is concerned about damage to her rotator cuff on the left while still recovering.  She did fracture her left index finger and has been treated with jordy tape technique.  She had no treatment first 2 weeks after injury.  Patient evaluation done with Dr. Hermosillo    Physical Exam:  Vitals: BP (!) 153/92   Pulse 90   Resp 16   Ht 1.651 m (5' 5\")   Wt 62.1 kg (137 lb)   BMI 22.80 kg/m    BMI= Body mass index is 22.8 kg/m .  Constitutional: healthy, alert and no acute distress   Psychiatric: mentation appears normal and affect normal/bright  NEURO: no focal deficits  SKIN: no excoriation or erythema. No signs of infection.  JOINT/EXTREMITIES:  Patient does have jordy tape on left index and middle.  Left index with visible swelling PIP.  She is able to mobilize the digits though at approximately 80% right now.   Left shoulder no pain with direct palpation  Patient is able to draw her shoulder up to 180 degrees readily.  She has no discomfort with internal rotation, external rotation or abduction  Strength: Patient demonstrates good strength in abduction, internal rotation, internal rotation as well as biceps.  He does state a little bit of discomfort in the biceps region.    IMAGING INTERPRETATION:  Left hand xrays reviewed.  PIP of left index with transverse fracture and shift volar.    Independent visualization of the images was performed.     ASSESSMENT:    Chief Complaint   Patient presents with     RECHECK     Left RCR NM biceps tenodesis on 12/21/18. Fell on 8/3/19.       ICD-10-CM    1. Closed displaced fracture of proximal phalanx of left index finger, initial encounter " S62.611A    2. S/P complete repair of rotator cuff Z98.890    3. Other injury of muscle(s) and tendon(s) of the rotator cuff of left shoulder, subsequent encounter S46.756K      Patient clinically appears to have rotator cuff irritation today secondary to a fall.  The repair appears to be holding well as she demonstrates good clinical strength.     Plan:   Patient to continue buddy taping couple more weeks per recommendations.  Patient is advised that range of motion may be limited slightly even after healing.  Swelling may take several weeks to months to completely go away.  Return to clinic as needed    BP Readings from Last 1 Encounters:   09/09/19 (!) 153/92     BP noted to be elevated today in office.  Patient to follow up with Primary Care provider regarding elevated blood pressure.    Scribed by  Dimple Nielsen PA-C   9/9/2019  11:26 AM      I attest I have seen and evaluated the patient.  I agree with above impression and plan.    Dinesh Hermosillo MD

## 2019-09-09 NOTE — LETTER
"    9/9/2019         RE: Nicki Guerra  [address]  [facility] [address]        Dear Colleague,    Thank you for referring your patient, Nicki Guerra, to the [facility]. Please see a copy of my visit note below.    HISTORY OF PRESENT ILLNESS:    Nicki Guerra is a 74 year old female who is seen in follow up for   Chief Complaint   Patient presents with     RECHECK     Left RCR NM biceps tenodesis on 12/21/18. Fell on 8/3/19.   Present symptoms: Patient reports a fall on 8/3/19 in which she fell forward on outstretched hands and hit one knee.  She is concerned about damage to her rotator cuff on the left while still recovering.  She did fracture her left index finger and has been treated with jordy tape technique.  She had no treatment first 2 weeks after injury.  Patient evaluation done with Dr. Hermosillo    Physical Exam:  Vitals: BP (!) 153/92   Pulse 90   Resp 16   Ht 1.651 m (5' 5\")   Wt 62.1 kg (137 lb)   BMI 22.80 kg/m     BMI= Body mass index is 22.8 kg/m .  Constitutional: healthy, alert and no acute distress   Psychiatric: mentation appears normal and affect normal/bright  NEURO: no focal deficits  SKIN: no excoriation or erythema. No signs of infection.  JOINT/EXTREMITIES:  Patient does have jordy tape on left index and middle.  Left index with visible swelling PIP.  She is able to mobilize the digits though at approximately 80% right now.   Left shoulder no pain with direct palpation  Patient is able to draw her shoulder up to 180 degrees readily.  She has no discomfort with internal rotation, external rotation or abduction  Strength: Patient demonstrates good strength in abduction, internal rotation, internal rotation as well as biceps.  He does state a little bit of discomfort in the biceps region.    IMAGING INTERPRETATION:  Left hand xrays reviewed.  PIP of left index with transverse fracture and shift volar.    Independent visualization of the images " was performed.     ASSESSMENT:    Chief Complaint   Patient presents with     RECHECK     Left RCR NM biceps tenodesis on 12/21/18. Fell on 8/3/19.       ICD-10-CM    1. Closed displaced fracture of proximal phalanx of left index finger, initial encounter S62.611A    2. S/P complete repair of rotator cuff Z98.890    3. Other injury of muscle(s) and tendon(s) of the rotator cuff of left shoulder, subsequent encounter S46.092D      Patient clinically appears to have rotator cuff irritation today secondary to a fall.  The repair appears to be holding well as she demonstrates good clinical strength.     Plan:   Patient to continue buddy taping couple more weeks per recommendations.  Patient is advised that range of motion may be limited slightly even after healing.  Swelling may take several weeks to months to completely go away.  Return to clinic as needed    BP Readings from Last 1 Encounters:   09/09/19 (!) 153/92     BP noted to be elevated today in office.  Patient to follow up with Primary Care provider regarding elevated blood pressure.    Scribed by  Dimple Nielsen PA-C   9/9/2019  11:26 AM      I attest I have seen and evaluated the patient.  I agree with above impression and plan.    Dinesh Hermosillo MD    Again, thank you for allowing me to participate in the care of your patient.        Sincerely,        Dinesh Hermosillo MD

## 2019-10-28 ENCOUNTER — ANCILLARY PROCEDURE (OUTPATIENT)
Dept: MAMMOGRAPHY | Facility: CLINIC | Age: 74
End: 2019-10-28
Attending: FAMILY MEDICINE
Payer: COMMERCIAL

## 2019-10-28 DIAGNOSIS — Z12.31 VISIT FOR SCREENING MAMMOGRAM: ICD-10-CM

## 2019-10-28 PROCEDURE — 77067 SCR MAMMO BI INCL CAD: CPT | Performed by: RADIOLOGY

## 2019-10-28 PROCEDURE — 77063 BREAST TOMOSYNTHESIS BI: CPT | Performed by: RADIOLOGY

## 2019-11-08 ENCOUNTER — HEALTH MAINTENANCE LETTER (OUTPATIENT)
Age: 74
End: 2019-11-08

## 2019-11-12 ASSESSMENT — ENCOUNTER SYMPTOMS
ABDOMINAL PAIN: 0
PARESTHESIAS: 0
NERVOUS/ANXIOUS: 0
JOINT SWELLING: 1
HEARTBURN: 0
SORE THROAT: 0
CHILLS: 0
CONSTIPATION: 0
FEVER: 0
BREAST MASS: 0
HEMATURIA: 0
NAUSEA: 1
FREQUENCY: 0
SHORTNESS OF BREATH: 0
EYE PAIN: 0
COUGH: 0
DYSURIA: 0
HEMATOCHEZIA: 0
DIARRHEA: 0
MYALGIAS: 1
ARTHRALGIAS: 1
HEADACHES: 0
WEAKNESS: 1
PALPITATIONS: 0
DIZZINESS: 0

## 2019-11-12 ASSESSMENT — ACTIVITIES OF DAILY LIVING (ADL): CURRENT_FUNCTION: NO ASSISTANCE NEEDED

## 2019-11-14 ENCOUNTER — ANCILLARY PROCEDURE (OUTPATIENT)
Dept: GENERAL RADIOLOGY | Facility: CLINIC | Age: 74
End: 2019-11-14
Attending: FAMILY MEDICINE
Payer: COMMERCIAL

## 2019-11-14 ENCOUNTER — OFFICE VISIT (OUTPATIENT)
Dept: FAMILY MEDICINE | Facility: CLINIC | Age: 74
End: 2019-11-14
Payer: COMMERCIAL

## 2019-11-14 VITALS
OXYGEN SATURATION: 99 % | SYSTOLIC BLOOD PRESSURE: 131 MMHG | HEIGHT: 65 IN | DIASTOLIC BLOOD PRESSURE: 75 MMHG | WEIGHT: 137.6 LBS | TEMPERATURE: 98.2 F | HEART RATE: 58 BPM | BODY MASS INDEX: 22.92 KG/M2 | RESPIRATION RATE: 20 BRPM

## 2019-11-14 DIAGNOSIS — G89.29 CHRONIC MIDLINE LOW BACK PAIN WITH LEFT-SIDED SCIATICA: ICD-10-CM

## 2019-11-14 DIAGNOSIS — Z00.00 ROUTINE GENERAL MEDICAL EXAMINATION AT A HEALTH CARE FACILITY: Primary | ICD-10-CM

## 2019-11-14 DIAGNOSIS — F41.9 ANXIETY: ICD-10-CM

## 2019-11-14 DIAGNOSIS — F32.1 MODERATE MAJOR DEPRESSION (H): ICD-10-CM

## 2019-11-14 DIAGNOSIS — Z13.220 LIPID SCREENING: ICD-10-CM

## 2019-11-14 DIAGNOSIS — K21.9 GASTROESOPHAGEAL REFLUX DISEASE WITHOUT ESOPHAGITIS: ICD-10-CM

## 2019-11-14 DIAGNOSIS — Z13.1 SCREENING FOR DIABETES MELLITUS: ICD-10-CM

## 2019-11-14 DIAGNOSIS — M54.42 CHRONIC MIDLINE LOW BACK PAIN WITH LEFT-SIDED SCIATICA: ICD-10-CM

## 2019-11-14 DIAGNOSIS — Z12.11 SPECIAL SCREENING FOR MALIGNANT NEOPLASMS, COLON: ICD-10-CM

## 2019-11-14 LAB
ALBUMIN SERPL-MCNC: 3.5 G/DL (ref 3.4–5)
ALP SERPL-CCNC: 82 U/L (ref 40–150)
ALT SERPL W P-5'-P-CCNC: 19 U/L (ref 0–50)
ANION GAP SERPL CALCULATED.3IONS-SCNC: 3 MMOL/L (ref 3–14)
AST SERPL W P-5'-P-CCNC: 19 U/L (ref 0–45)
BILIRUB SERPL-MCNC: 0.4 MG/DL (ref 0.2–1.3)
BUN SERPL-MCNC: 11 MG/DL (ref 7–30)
CALCIUM SERPL-MCNC: 9.1 MG/DL (ref 8.5–10.1)
CHLORIDE SERPL-SCNC: 104 MMOL/L (ref 94–109)
CHOLEST SERPL-MCNC: 201 MG/DL
CO2 SERPL-SCNC: 29 MMOL/L (ref 20–32)
CREAT SERPL-MCNC: 0.66 MG/DL (ref 0.52–1.04)
ERYTHROCYTE [DISTWIDTH] IN BLOOD BY AUTOMATED COUNT: 13.3 % (ref 10–15)
GFR SERPL CREATININE-BSD FRML MDRD: 88 ML/MIN/{1.73_M2}
GLUCOSE SERPL-MCNC: 88 MG/DL (ref 70–99)
HCT VFR BLD AUTO: 38.5 % (ref 35–47)
HDLC SERPL-MCNC: 53 MG/DL
HGB BLD-MCNC: 12.8 G/DL (ref 11.7–15.7)
LDLC SERPL CALC-MCNC: 132 MG/DL
MCH RBC QN AUTO: 30.5 PG (ref 26.5–33)
MCHC RBC AUTO-ENTMCNC: 33.2 G/DL (ref 31.5–36.5)
MCV RBC AUTO: 92 FL (ref 78–100)
NONHDLC SERPL-MCNC: 148 MG/DL
PLATELET # BLD AUTO: 232 10E9/L (ref 150–450)
POTASSIUM SERPL-SCNC: 4.4 MMOL/L (ref 3.4–5.3)
PROT SERPL-MCNC: 7.3 G/DL (ref 6.8–8.8)
RBC # BLD AUTO: 4.2 10E12/L (ref 3.8–5.2)
SODIUM SERPL-SCNC: 136 MMOL/L (ref 133–144)
TRIGL SERPL-MCNC: 82 MG/DL
WBC # BLD AUTO: 4.6 10E9/L (ref 4–11)

## 2019-11-14 PROCEDURE — 80061 LIPID PANEL: CPT | Performed by: FAMILY MEDICINE

## 2019-11-14 PROCEDURE — G0439 PPPS, SUBSEQ VISIT: HCPCS | Performed by: FAMILY MEDICINE

## 2019-11-14 PROCEDURE — 85027 COMPLETE CBC AUTOMATED: CPT | Performed by: FAMILY MEDICINE

## 2019-11-14 PROCEDURE — 36415 COLL VENOUS BLD VENIPUNCTURE: CPT | Performed by: FAMILY MEDICINE

## 2019-11-14 PROCEDURE — 99214 OFFICE O/P EST MOD 30 MIN: CPT | Mod: 25 | Performed by: FAMILY MEDICINE

## 2019-11-14 PROCEDURE — 72100 X-RAY EXAM L-S SPINE 2/3 VWS: CPT | Mod: FY

## 2019-11-14 PROCEDURE — 80053 COMPREHEN METABOLIC PANEL: CPT | Performed by: FAMILY MEDICINE

## 2019-11-14 ASSESSMENT — ANXIETY QUESTIONNAIRES
GAD7 TOTAL SCORE: 6
3. WORRYING TOO MUCH ABOUT DIFFERENT THINGS: SEVERAL DAYS
5. BEING SO RESTLESS THAT IT IS HARD TO SIT STILL: NOT AT ALL
6. BECOMING EASILY ANNOYED OR IRRITABLE: SEVERAL DAYS
2. NOT BEING ABLE TO STOP OR CONTROL WORRYING: SEVERAL DAYS
IF YOU CHECKED OFF ANY PROBLEMS ON THIS QUESTIONNAIRE, HOW DIFFICULT HAVE THESE PROBLEMS MADE IT FOR YOU TO DO YOUR WORK, TAKE CARE OF THINGS AT HOME, OR GET ALONG WITH OTHER PEOPLE: SOMEWHAT DIFFICULT
1. FEELING NERVOUS, ANXIOUS, OR ON EDGE: SEVERAL DAYS
7. FEELING AFRAID AS IF SOMETHING AWFUL MIGHT HAPPEN: SEVERAL DAYS

## 2019-11-14 ASSESSMENT — ENCOUNTER SYMPTOMS
NAUSEA: 1
DYSURIA: 0
SORE THROAT: 0
MYALGIAS: 1
HEMATOCHEZIA: 0
PARESTHESIAS: 0
FEVER: 0
DIZZINESS: 0
PALPITATIONS: 0
JOINT SWELLING: 1
CHILLS: 0
HEADACHES: 0
EYE PAIN: 0
COUGH: 0
FREQUENCY: 0
HEARTBURN: 0
SHORTNESS OF BREATH: 0
BREAST MASS: 0
DIARRHEA: 0
WEAKNESS: 1
ARTHRALGIAS: 1
CONSTIPATION: 0
ABDOMINAL PAIN: 0
NERVOUS/ANXIOUS: 0
HEMATURIA: 0

## 2019-11-14 ASSESSMENT — PAIN SCALES - GENERAL: PAINLEVEL: NO PAIN (0)

## 2019-11-14 ASSESSMENT — ACTIVITIES OF DAILY LIVING (ADL): CURRENT_FUNCTION: NO ASSISTANCE NEEDED

## 2019-11-14 ASSESSMENT — PATIENT HEALTH QUESTIONNAIRE - PHQ9
5. POOR APPETITE OR OVEREATING: SEVERAL DAYS
SUM OF ALL RESPONSES TO PHQ QUESTIONS 1-9: 9

## 2019-11-14 ASSESSMENT — MIFFLIN-ST. JEOR: SCORE: 1135.03

## 2019-11-14 NOTE — PROGRESS NOTES
"  SUBJECTIVE:   Nicki Guerra is a 74 year old female who presents for Preventive Visit.  {PVP to remind patient that this is not necessarily a physical exam; physical exam may or may not be done:572563::\"click delete button to remove this line now\"}  {PVP to inform patient that additional E&M charge may apply, if additional problems addressed:685005::\"click delete button to remove this line now\"}  Are you in the first 12 months of your Medicare Part B coverage?  { :311439::\"No\"}    Physical Health:    In general, how would you rate your overall physical health? { :727324}    Outside of work, how many days during the week do you exercise? { :036775}    Outside of work, approximately how many minutes a day do you exercise?{ :103249}    If you drink alcohol do you typically have >3 drinks per day or >7 drinks per week? { :138851}    Do you usually eat at least 4 servings of fruit and vegetables a day, include whole grains & fiber and avoid regularly eating high fat or \"junk\" foods? { :495119::\"Yes\"}    Do you have any problems taking medications regularly?  { :749596::\"No\"}    Do you have any side effects from medications? { :780243}    Needs assistance for the following daily activities: { :943264}    Which of the following safety concerns are present in your home?  { :846452::\"none identified\"}     Hearing impairment: { :821867}    In the past 6 months, have you been bothered by leaking of urine? { :145833}    Mental Health:    In general, how would you rate your overall mental or emotional health? { :513575}  PHQ-2 Score: (P) 2    Do you feel safe in your environment? { :126299}    Have you ever done Advance Care Planning? (For example, a Health Directive, POLST, or a discussion with a medical provider or your loved ones about your wishes): { :189187}    Additional concerns to address?  {If YES, notify patient they may be responsible for a copay, coinsurance or deductible if the visit today includes services " "such as checking on a sore throat, having an x-ray or lab test, or treating and evaluating a new or existing condition :504119::\"No\"}    Fall risk:  { :083601}  {If any of the above assessments are answered yes, consider ordering appropriate referrals (Optional):008316::\"click delete button to remove this line now\"}  Cognitive Screening: { :126722}    {Do you have sleep apnea, excessive snoring or daytime drowsiness? (Optional):514095}    {Outside tests to abstract? :499310}    {additional problems to add (Optional):814208}    Reviewed and updated as needed this visit by clinical staff         Reviewed and updated as needed this visit by Provider        Social History     Tobacco Use     Smoking status: Former Smoker     Smokeless tobacco: Never Used     Tobacco comment: quit 28 yrs ago   Substance Use Topics     Alcohol use: No                           Current providers sharing in care for this patient include: {Rooming staff:  Please update Care Team in Rooming Activity, refresh this note and then delete this statement}  Patient Care Team:  Melonie Lucia MD as PCP - General  Melonie Lucia MD as Assigned PCP    The following health maintenance items are reviewed in Epic and correct as of today:  Health Maintenance   Topic Date Due     DEPRESSION ACTION PLAN  1945     MEDICARE ANNUAL WELLNESS VISIT  04/07/2012     ZOSTER IMMUNIZATION (2 of 3) 10/24/2012     ADVANCE CARE PLANNING  08/30/2017     FALL RISK ASSESSMENT  08/13/2019     INFLUENZA VACCINE (1) 09/01/2019     COLONOSCOPY  12/11/2019     DTAP/TDAP/TD IMMUNIZATION (3 - Td) 01/19/2020     PHQ-9  02/14/2020     MAMMO SCREENING  10/28/2021     LIPID  03/23/2022     DEXA  Completed     HEPATITIS C SCREENING  Completed     PNEUMOCOCCAL IMMUNIZATION 65+ LOW/MEDIUM RISK  Completed     IPV IMMUNIZATION  Aged Out     MENINGITIS IMMUNIZATION  Aged Out     {Chronicprobdata (Optional):820435}  {Decision Support (Optional):267201}    ROS:  {ROS " "COMP:122045}    OBJECTIVE:   There were no vitals taken for this visit. Estimated body mass index is 22.8 kg/m  as calculated from the following:    Height as of 19: 1.651 m (5' 5\").    Weight as of 19: 62.1 kg (137 lb).  EXAM:   {Exam :331183}    {Diagnostic Test Results (Optional):169204::\"Diagnostic Test Results:\",\"Labs reviewed in Epic\"}    ASSESSMENT / PLAN:   {Dia Picklist:116152}    COUNSELING:  {Medicare Counselin}    Estimated body mass index is 22.8 kg/m  as calculated from the following:    Height as of 19: 1.651 m (5' 5\").    Weight as of 19: 62.1 kg (137 lb).    {Weight Management Plan (ACO) Complete if BMI is abnormal-  Ages 18-64  BMI >24.9.  Age 65+ with BMI <23 or >30 (Optional):841487}     reports that she has quit smoking. She has never used smokeless tobacco.  {Tobacco Cessation -- Complete if patient is a smoker (Optional):676667}    Appropriate preventive services were discussed with this patient, including applicable screening as appropriate for cardiovascular disease, diabetes, osteopenia/osteoporosis, and glaucoma.  As appropriate for age/gender, discussed screening for colorectal cancer, prostate cancer, breast cancer, and cervical cancer. Checklist reviewing preventive services available has been given to the patient.    Reviewed patients plan of care and provided an AVS. The {CarePlan:580459} for Nicki meets the Care Plan requirement. This Care Plan has been established and reviewed with the {PATIENT, FAMILY MEMBER, CAREGIVER:953109}.    Counseling Resources:  ATP IV Guidelines  Pooled Cohorts Equation Calculator  Breast Cancer Risk Calculator  FRAX Risk Assessment  ICSI Preventive Guidelines  Dietary Guidelines for Americans, 2010  USDA's MyPlate  ASA Prophylaxis  Lung CA Screening    Melonie Lucia MD  [facility]  "

## 2019-11-14 NOTE — PROGRESS NOTES
"SUBJECTIVE:   Nicki Guerra is a 74 year old female who presents for Preventive Visit.    Are you in the first 12 months of your Medicare coverage?  No    Healthy Habits:     In general, how would you rate your overall health?  Good    Frequency of exercise:  6-7 days/week    Duration of exercise:  15-30 minutes    Do you usually eat at least 4 servings of fruit and vegetables a day, include whole grains    & fiber and avoid regularly eating high fat or \"junk\" foods?  Yes    Taking medications regularly:  Yes    Medication side effects:  None    Ability to successfully perform activities of daily living:  No assistance needed    Home Safety:  No safety concerns identified    Hearing Impairment:  No hearing concerns    In the past 6 months, have you been bothered by leaking of urine?  No    In general, how would you rate your overall mental or emotional health?  Good      PHQ-2 Total Score: 2    Additional concerns today:  Yes    Exercise:  Indoor PHYSICAL THERAPY type exercises.    Do you feel safe in your environment? Yes    Have you ever done Advance Care Planning? (For example, a Health Directive, POLST, or a discussion with a medical provider or your loved ones about your wishes): Yes, patient states has an Advance Care Planning document and will bring a copy to the clinic.      Fall risk  Fallen 2 or more times in the past year?: No  Any fall with injury in the past year?: No    Cognitive Screening   1) Repeat 3 items (Leader, Season, Table)    2) Clock draw: NORMAL  3) 3 item recall: Recalls 3 objects  Results: 3 items recalled: COGNITIVE IMPAIRMENT LESS LIKELY    Mini-CogTM Copyright AMADEO Ferreira. Licensed by the author for use in [facility]; reprinted with permission (lorena@.[facility]). All rights reserved.      Do you have sleep apnea, excessive snoring or daytime drowsiness?: no    Reviewed and updated as needed this visit by clinical staff  Tobacco  Allergies  Meds  Med Hx  Surg Hx  Fam Hx  " Soc Hx        Reviewed and updated as needed this visit by Provider  Tobacco  Allergies  Meds  Med Hx  Surg Hx  Fam Hx  Soc Hx       Social History     Tobacco Use     Smoking status: Former Smoker     Smokeless tobacco: Never Used     Tobacco comment: quit 28 yrs ago   Substance Use Topics     Alcohol use: No         Alcohol Use 11/12/2019   Prescreen: >3 drinks/day or >7 drinks/week? No   Prescreen: >3 drinks/day or >7 drinks/week? -           Anxiety Follow-Up    How are you doing with your anxiety since your last visit? No change    Are you having other symptoms that might be associated with anxiety? No    Have you had a significant life event? No     Are you feeling depressed? No    Do you have any concerns with your use of alcohol or other drugs? No    Social History     Tobacco Use     Smoking status: Former Smoker     Smokeless tobacco: Never Used     Tobacco comment: quit 28 yrs ago   Substance Use Topics     Alcohol use: No     Drug use: No     ALEN-7 SCORE 8/13/2018 8/14/2019 11/14/2019   Total Score - - -   Total Score 4 6 6     PHQ 8/13/2018 8/14/2019 11/14/2019   PHQ-9 Total Score 11 6 9   Q9: Thoughts of better off dead/self-harm past 2 weeks Several days Not at all Several days           Current providers sharing in care for this patient include:   Patient Care Team:  Melonie Lucia MD as PCP - General  Melonie Lucia MD as Assigned PCP    The following health maintenance items are reviewed in Epic and correct as of today:  Health Maintenance   Topic Date Due     DEPRESSION ACTION PLAN  1945     MEDICARE ANNUAL WELLNESS VISIT  04/07/2012     ZOSTER IMMUNIZATION (2 of 3) 10/24/2012     ADVANCE CARE PLANNING  08/30/2017     FALL RISK ASSESSMENT  08/13/2019     INFLUENZA VACCINE (1) 09/01/2019     COLONOSCOPY  12/11/2019     DTAP/TDAP/TD IMMUNIZATION (3 - Td) 01/19/2020     PHQ-9  02/14/2020     MAMMO SCREENING  10/28/2021     LIPID  03/23/2022     DEXA  Completed     HEPATITIS C  "SCREENING  Completed     PNEUMOCOCCAL IMMUNIZATION 65+ LOW/MEDIUM RISK  Completed     IPV IMMUNIZATION  Aged Out     MENINGITIS IMMUNIZATION  Aged Out     BP Readings from Last 3 Encounters:   11/14/19 131/75   09/09/19 (!) 153/92   08/15/19 128/80    Wt Readings from Last 3 Encounters:   11/14/19 62.4 kg (137 lb 9.6 oz)   09/09/19 62.1 kg (137 lb)   08/15/19 62.1 kg (137 lb)                  Pneumonia Vaccine:series completed.  Mammogram Screening: Mammogram Screening: Patient over age 50, mutual decision to screen reflected in health maintenance.    Review of Systems   Constitutional: Negative for chills and fever.   HENT: Negative for congestion, ear pain, hearing loss and sore throat.    Eyes: Positive for visual disturbance. Negative for pain.   Respiratory: Negative for cough and shortness of breath.    Cardiovascular: Negative for chest pain, palpitations and peripheral edema.   Gastrointestinal: Positive for nausea. Negative for abdominal pain, constipation, diarrhea, heartburn and hematochezia.   Breasts:  Negative for tenderness, breast mass and discharge.   Genitourinary: Negative for dysuria, frequency, genital sores, hematuria, pelvic pain, urgency, vaginal bleeding and vaginal discharge.   Musculoskeletal: Positive for arthralgias, joint swelling and myalgias.   Skin: Negative for rash.   Neurological: Positive for weakness. Negative for dizziness, headaches and paresthesias.   Psychiatric/Behavioral: Positive for mood changes. The patient is not nervous/anxious.      Watching for Macular degeneration with eye doctor.    Intermittent nausea - uncertain correlating factors.  No pain.  Epigastric area.  Oyster crackers can be used.  TUMS.  Very sporadic.    MSK: knee pain, low back - sciatica intermittently.  Pain down left leg laterally to bottom of foot.  No weakness.  No recent injury.  Weakness -  \"Not as strong as I used to be\"  Mood - uses fluoxetine with good effect.  Psychiatrist retiring, " "desires to follow here for anxiety treatment.  Has used buspar in past with side effects.     OBJECTIVE:   /75 (BP Location: Right arm, Patient Position: Chair, Cuff Size: Adult Regular)   Pulse 58   Temp 98.2  F (36.8  C) (Oral)   Resp 20   Ht 1.651 m (5' 5\")   Wt 62.4 kg (137 lb 9.6 oz)   LMP  (Exact Date)   SpO2 99%   Breastfeeding No   BMI 22.90 kg/m   Estimated body mass index is 22.9 kg/m  as calculated from the following:    Height as of this encounter: 1.651 m (5' 5\").    Weight as of this encounter: 62.4 kg (137 lb 9.6 oz).  Physical Exam  GENERAL APPEARANCE: healthy, alert and no distress  EYES: Eyes grossly normal to inspection, PERRL and conjunctivae and sclerae normal  HENT: ear canals and TM's normal, nose and mouth without ulcers or lesions, oropharynx clear and oral mucous membranes moist  NECK: no adenopathy, no asymmetry, masses, or scars and thyroid normal to palpation  RESP: lungs clear to auscultation - no rales, rhonchi or wheezes  BREAST: normal without masses, tenderness or nipple discharge and no palpable axillary masses or adenopathy  CV: regular rate and rhythm, normal S1 S2, no S3 or S4, no murmur, click or rub, no peripheral edema and peripheral pulses strong  ABDOMEN: soft, nontender, no hepatosplenomegaly, no masses and bowel sounds normal   (female): normal female external genitalia, normal urethral meatus, vaginal mucosal atrophy noted, normal cervix, adnexae, and uterus without masses or abnormal discharge  MS: no musculoskeletal defects are noted, gait is age appropriate without ataxia, mild tenderness paravertebral muscles lumbar area.  SLR negative.  Reflexes equal.    SKIN: no suspicious lesions or rashes  NEURO: Normal strength and tone, sensory exam grossly normal, mentation intact and speech normal  PSYCH: mentation appears normal and affect normal/bright    Diagnostic Test Results:  Labs reviewed in Epic  Results for orders placed or performed in visit on " 11/14/19   Comprehensive metabolic panel     Status: None   Result Value Ref Range    Sodium 136 133 - 144 mmol/L    Potassium 4.4 3.4 - 5.3 mmol/L    Chloride 104 94 - 109 mmol/L    Carbon Dioxide 29 20 - 32 mmol/L    Anion Gap 3 3 - 14 mmol/L    Glucose 88 70 - 99 mg/dL    Urea Nitrogen 11 7 - 30 mg/dL    Creatinine 0.66 0.52 - 1.04 mg/dL    GFR Estimate 88 >60 mL/min/[1.73_m2]    GFR Estimate If Black >90 >60 mL/min/[1.73_m2]    Calcium 9.1 8.5 - 10.1 mg/dL    Bilirubin Total 0.4 0.2 - 1.3 mg/dL    Albumin 3.5 3.4 - 5.0 g/dL    Protein Total 7.3 6.8 - 8.8 g/dL    Alkaline Phosphatase 82 40 - 150 U/L    ALT 19 0 - 50 U/L    AST 19 0 - 45 U/L   CBC with platelets     Status: None   Result Value Ref Range    WBC 4.6 4.0 - 11.0 10e9/L    RBC Count 4.20 3.8 - 5.2 10e12/L    Hemoglobin 12.8 11.7 - 15.7 g/dL    Hematocrit 38.5 35.0 - 47.0 %    MCV 92 78 - 100 fl    MCH 30.5 26.5 - 33.0 pg    MCHC 33.2 31.5 - 36.5 g/dL    RDW 13.3 10.0 - 15.0 %    Platelet Count 232 150 - 450 10e9/L   Lipid panel reflex to direct LDL Fasting     Status: Abnormal   Result Value Ref Range    Cholesterol 201 (H) <200 mg/dL    Triglycerides 82 <150 mg/dL    HDL Cholesterol 53 >49 mg/dL    LDL Cholesterol Calculated 132 (H) <100 mg/dL    Non HDL Cholesterol 148 (H) <130 mg/dL     Xray - lumbar xray - I independently visualized the xray:  Disc space narrowing L4-5.      ASSESSMENT / PLAN:   1. Routine general medical examination at a health care facility  Screenings and preventative care discussed.  Fasting labs.   - Comprehensive metabolic panel  - CBC with platelets    2. Moderate major depression (H)  3. Anxiety  Continue on fluoxetine.  Notify me when refills from psych are gone - refill if within next 6 months.    Follow up appointment in 6 months.      4. Chronic midline low back pain with left-sided sciatica  DDD L4-5 - await final xray results.  Probably MRI for additional evaluation.    Has PHYSICAL THERAPY exercises at home, continue  "these.  - XR Lumbar Spine 2/3 Views; Future    5. Gastroesophageal reflux disease without esophagitis  Crackers or TUMS  - follow up if symptoms not controlled.     6. Screening for diabetes mellitus  - Comprehensive metabolic panel    7. Lipid screening  - Lipid panel reflex to direct LDL Fasting    8. Special screening for malignant neoplasms, colon  - GASTROENTEROLOGY ADULT REF PROCEDURE ONLY [facility] [phone]; No Provider Preference          COUNSELING:  Reviewed preventive health counseling, as reflected in patient instructions       Regular exercise       Healthy diet/nutrition       Vision screening       Hearing screening       Dental care       Bladder control       Fall risk prevention       Osteoporosis Prevention/Bone Health       Colon cancer screening    Estimated body mass index is 22.9 kg/m  as calculated from the following:    Height as of this encounter: 1.651 m (5' 5\").    Weight as of this encounter: 62.4 kg (137 lb 9.6 oz).    Weight management plan noted, stable and monitoring  Wt Readings from Last 5 Encounters:   11/14/19 62.4 kg (137 lb 9.6 oz)   09/09/19 62.1 kg (137 lb)   08/15/19 62.1 kg (137 lb)   08/14/19 62.1 kg (137 lb)   05/20/19 59 kg (130 lb)        reports that she has quit smoking. She has never used smokeless tobacco.      Appropriate preventive services were discussed with this patient, including applicable screening as appropriate for cardiovascular disease, diabetes, osteopenia/osteoporosis, and glaucoma.  As appropriate for age/gender, discussed screening for colorectal cancer, prostate cancer, breast cancer, and cervical cancer. Checklist reviewing preventive services available has been given to the patient.    Reviewed patients plan of care and provided an AVS. The Basic Care Plan (routine screening as documented in Health Maintenance) for Nicki meets the Care Plan requirement. This Care Plan has been established and reviewed with the Patient.    Counseling " Resources:  ATP IV Guidelines  Pooled Cohorts Equation Calculator  Breast Cancer Risk Calculator  FRAX Risk Assessment  ICSI Preventive Guidelines  Dietary Guidelines for Americans, 2010  USDA's MyPlate  ASA Prophylaxis  Lung CA Screening    Melonie Lucia MD  [facility]    Patient Instructions     Fasting labs today  For the fluoxetine prescription, let me know when your refill is due next.   Monitor the nausea symptoms and make a food diary to see if there are any preceding similarities with episodes.    Eating something or TUMS recommended for symptoms of nausea - if not effective, follow up is recommended.      I will send the final xray report when available with recommendations for additional evaluation or treatment.

## 2019-11-14 NOTE — PATIENT INSTRUCTIONS
Fasting labs today  For the fluoxetine prescription, let me know when your refill is due next.   Monitor the nausea symptoms and make a food diary to see if there are any preceding similarities with episodes.    Eating something or TUMS recommended for symptoms of nausea - if not effective, follow up is recommended.      I will send the final xray report when available with recommendations for additional evaluation or treatment.      Preventive Health Recommendations    See your health care provider every year to    Review health changes.     Discuss preventive care.      Review your medicines if your doctor has prescribed any.      You no longer need a yearly Pap test unless you've had an abnormal Pap test in the past 10 years. If you have vaginal symptoms, such as bleeding or discharge, be sure to talk with your provider about a Pap test.      Every 1 to 2 years, have a mammogram.  If you are over 69, talk with your health care provider about whether or not you want to continue having screening mammograms.      Every 10 years, have a colonoscopy. Or, have a yearly FIT test (stool test). These exams will check for colon cancer.       Have a cholesterol test every 5 years, or more often if your doctor advises it.       Have a diabetes test (fasting glucose) every three years. If you are at risk for diabetes, you should have this test more often.       At age 65, have a bone density scan (DEXA) to check for osteoporosis (brittle bone disease).    Shots:    Get a flu shot each year.    Get a tetanus shot every 10 years.    Talk to your doctor about your pneumonia vaccines. There are now two you should receive - Pneumovax (PPSV 23) and Prevnar (PCV 13).    Talk to your pharmacist about the shingles vaccine.    Talk to your doctor about the hepatitis B vaccine.    Nutrition:     Eat at least 5 servings of fruits and vegetables each day.      Eat whole-grain bread, whole-wheat pasta and brown rice instead of white grains  and rice.      Get adequate about Calcium and Vitamin D.     Lifestyle    Exercise at least 150 minutes a week (30 minutes a day, 5 days a week). This will help you control your weight and prevent disease.      Limit alcohol to one drink per day.      No smoking.       Wear sunscreen to prevent skin cancer.       See your dentist twice a year for an exam and cleaning.      See your eye doctor every 1 to 2 years to screen for conditions such as glaucoma, macular degeneration, cataracts, etc.    Personalized Prevention Plan  You are due for the preventive services outlined below.  Your care team is available to assist you in scheduling these services.  If you have already completed any of these items, please share that information with your care team to update in your medical record.    Health Maintenance Due   Topic Date Due     Depression Action Plan  1945     Annual Wellness Visit  04/07/2012     Zoster (Shingles) Vaccine (2 of 3) 10/24/2012     Discuss Advance Care Planning  08/30/2017     FALL RISK ASSESSMENT  08/13/2019     Flu Vaccine (1) 09/01/2019     Colonoscopy  12/11/2019

## 2019-11-15 ASSESSMENT — ANXIETY QUESTIONNAIRES: GAD7 TOTAL SCORE: 6

## 2019-11-18 ENCOUNTER — TELEPHONE (OUTPATIENT)
Dept: FAMILY MEDICINE | Facility: CLINIC | Age: 74
End: 2019-11-18

## 2019-11-18 DIAGNOSIS — Z12.11 SPECIAL SCREENING FOR MALIGNANT NEOPLASMS, COLON: Primary | ICD-10-CM

## 2019-11-18 NOTE — TELEPHONE ENCOUNTER
I contacted patient to schedule her referral for her colonoscopy and she was really hoping to be able to do the FIT test. Please advise pt if this is feasible for her. Ok to respond via Mygistic[employer].

## 2019-11-19 NOTE — TELEPHONE ENCOUNTER
Please call patient re:  I have an order in for the FIT testing for patient. Due to the cold weather, we are unable to mail the kit to her.  Please place kit at  and she can come by to pick it up at her convenience.    LOGANK

## 2019-11-22 ENCOUNTER — ANCILLARY PROCEDURE (OUTPATIENT)
Dept: MRI IMAGING | Facility: CLINIC | Age: 74
End: 2019-11-22
Attending: FAMILY MEDICINE
Payer: COMMERCIAL

## 2019-11-22 DIAGNOSIS — G89.29 CHRONIC MIDLINE LOW BACK PAIN WITH LEFT-SIDED SCIATICA: ICD-10-CM

## 2019-11-22 DIAGNOSIS — M54.42 CHRONIC MIDLINE LOW BACK PAIN WITH LEFT-SIDED SCIATICA: ICD-10-CM

## 2019-11-22 PROCEDURE — 72148 MRI LUMBAR SPINE W/O DYE: CPT | Performed by: RADIOLOGY

## 2019-12-05 ENCOUNTER — MYC MEDICAL ADVICE (OUTPATIENT)
Dept: FAMILY MEDICINE | Facility: CLINIC | Age: 74
End: 2019-12-05

## 2019-12-11 DIAGNOSIS — G89.29 CHRONIC MIDLINE LOW BACK PAIN WITH LEFT-SIDED SCIATICA: ICD-10-CM

## 2019-12-11 DIAGNOSIS — M54.42 CHRONIC MIDLINE LOW BACK PAIN WITH LEFT-SIDED SCIATICA: ICD-10-CM

## 2019-12-11 LAB
BASOPHILS # BLD AUTO: 0 10E9/L (ref 0–0.2)
BASOPHILS NFR BLD AUTO: 0.6 %
CRP SERPL-MCNC: <2.9 MG/L (ref 0–8)
DIFFERENTIAL METHOD BLD: NORMAL
EOSINOPHIL # BLD AUTO: 0.1 10E9/L (ref 0–0.7)
EOSINOPHIL NFR BLD AUTO: 2.8 %
ERYTHROCYTE [DISTWIDTH] IN BLOOD BY AUTOMATED COUNT: 13.2 % (ref 10–15)
ERYTHROCYTE [SEDIMENTATION RATE] IN BLOOD BY WESTERGREN METHOD: 10 MM/H (ref 0–30)
HCT VFR BLD AUTO: 40.6 % (ref 35–47)
HGB BLD-MCNC: 13.5 G/DL (ref 11.7–15.7)
LYMPHOCYTES # BLD AUTO: 1.6 10E9/L (ref 0.8–5.3)
LYMPHOCYTES NFR BLD AUTO: 34.3 %
MCH RBC QN AUTO: 31.2 PG (ref 26.5–33)
MCHC RBC AUTO-ENTMCNC: 33.3 G/DL (ref 31.5–36.5)
MCV RBC AUTO: 94 FL (ref 78–100)
MONOCYTES # BLD AUTO: 0.5 10E9/L (ref 0–1.3)
MONOCYTES NFR BLD AUTO: 10.1 %
NEUTROPHILS # BLD AUTO: 2.4 10E9/L (ref 1.6–8.3)
NEUTROPHILS NFR BLD AUTO: 52.2 %
PLATELET # BLD AUTO: 219 10E9/L (ref 150–450)
RBC # BLD AUTO: 4.33 10E12/L (ref 3.8–5.2)
WBC # BLD AUTO: 4.7 10E9/L (ref 4–11)

## 2019-12-11 PROCEDURE — 85025 COMPLETE CBC W/AUTO DIFF WBC: CPT | Performed by: FAMILY MEDICINE

## 2019-12-11 PROCEDURE — 86140 C-REACTIVE PROTEIN: CPT | Performed by: FAMILY MEDICINE

## 2019-12-11 PROCEDURE — 87040 BLOOD CULTURE FOR BACTERIA: CPT | Performed by: FAMILY MEDICINE

## 2019-12-11 PROCEDURE — 36415 COLL VENOUS BLD VENIPUNCTURE: CPT | Performed by: FAMILY MEDICINE

## 2019-12-11 PROCEDURE — 85652 RBC SED RATE AUTOMATED: CPT | Performed by: FAMILY MEDICINE

## 2019-12-13 ENCOUNTER — DOCUMENTATION ONLY (OUTPATIENT)
Dept: OTHER | Facility: CLINIC | Age: 74
End: 2019-12-13

## 2019-12-17 LAB
BACTERIA SPEC CULT: NO GROWTH
Lab: NORMAL
SPECIMEN SOURCE: NORMAL

## 2019-12-20 ENCOUNTER — OFFICE VISIT (OUTPATIENT)
Dept: NEUROLOGY | Facility: CLINIC | Age: 74
End: 2019-12-20
Payer: COMMERCIAL

## 2019-12-20 VITALS
SYSTOLIC BLOOD PRESSURE: 122 MMHG | WEIGHT: 136.9 LBS | HEIGHT: 65 IN | HEART RATE: 66 BPM | DIASTOLIC BLOOD PRESSURE: 75 MMHG | BODY MASS INDEX: 22.81 KG/M2 | OXYGEN SATURATION: 100 %

## 2019-12-20 DIAGNOSIS — M54.17 LUMBOSACRAL RADICULOPATHY AT S1: Primary | ICD-10-CM

## 2019-12-20 PROCEDURE — 99203 OFFICE O/P NEW LOW 30 MIN: CPT | Performed by: PHYSICIAN ASSISTANT

## 2019-12-20 ASSESSMENT — PAIN SCALES - GENERAL: PAINLEVEL: MODERATE PAIN (4)

## 2019-12-20 ASSESSMENT — MIFFLIN-ST. JEOR: SCORE: 1131.85

## 2019-12-20 NOTE — PROGRESS NOTES
Neurosurgery Clinic Consult    HPI    Ms. Guerra is a 74-year-old female referred to us by her primary care provider for evaluation of low back pain and radiating left leg pain in an S1 distribution.  Patient states symptoms are most bothersome with standing and activity they get better when she goes to sleep at night and her back pain is more bothersome in the morning before she is on her exercises.  Her left leg pain has been intermittent but becoming more frequent and is sharp and in a left S1 distribution from her low back all the way down into the bottom of her foot.  She also feels a sense of numbness or fullness in the bottom of her left foot.  She denies weakness.  She has not tried injections.  She is been doing some home exercises but has not been to physical therapy recently.  Denies increased pain with coughing or sneezing.    Medical history  Osteoporosis    Social history  She is a  at large, works as a personal care assistant for 3 patients, and is a  at the [employer]      B/P: 122/75, T: Data Unavailable, P: 66, R: Data Unavailable       Exam    Alert and oriented no acute distress  Bilateral lower extremities with 5 out of 5 strength  Reflexes 2+ bilateral patella 2+ right ankle absent to left ankle  Positive straight leg raise on the left negative on the right  Negative ankle clonus negative Babinski  Lumbar spine nontender to palpation  Able to stand on heels and toes gait is normal    Imaging    Lumbar MRI demonstrates moderate stenosis at L3-4, as well as left lateral recess stenosis that impinges on the traversing S1 nerve at the L5-S1 level.    Assessment    74-year-old female with left S1 radiculopathy without neurologic deficit, due to lateral recess stenosis on the left at L5-S1.    Plan:      The patient would like to meet with physical therapy in the new year, and have some guidance on a home exercise routine that might benefit her.  If this fails she would  be interested in an injection targeting the left S1 nerve.  She will contact us after she is tried physical therapy and given this some time, if she requires further assistance with her symptoms she will return to our clinic.    Total time of 30 minutes met with the patient today greater than 50% spent face to face in counseling and coordination of care.

## 2019-12-20 NOTE — LETTER
12/20/2019         RE: Nicki Guerra  [address]  [place] [address]        Dear Colleague,    Thank you for referring your patient, Nicki Guerra, to the [facility]. Please see a copy of my visit note below.    Neurosurgery Clinic Consult    HPI    Ms. Guerra is a 74-year-old female referred to us by her primary care provider for evaluation of low back pain and radiating left leg pain in an S1 distribution.  Patient states symptoms are most bothersome with standing and activity they get better when she goes to sleep at night and her back pain is more bothersome in the morning before she is on her exercises.  Her left leg pain has been intermittent but becoming more frequent and is sharp and in a left S1 distribution from her low back all the way down into the bottom of her foot.  She also feels a sense of numbness or fullness in the bottom of her left foot.  She denies weakness.  She has not tried injections.  She is been doing some home exercises but has not been to physical therapy recently.  Denies increased pain with coughing or sneezing.    Medical history  Osteoporosis    Social history  She is a  at large, works as a personal care assistant for 3 patients, and is a  at the [employer]      B/P: 122/75, T: Data Unavailable, P: 66, R: Data Unavailable       Exam    Alert and oriented no acute distress  Bilateral lower extremities with 5 out of 5 strength  Reflexes 2+ bilateral patella 2+ right ankle absent to left ankle  Positive straight leg raise on the left negative on the right  Negative ankle clonus negative Babinski  Lumbar spine nontender to palpation  Able to stand on heels and toes gait is normal    Imaging    Lumbar MRI demonstrates moderate stenosis at L3-4, as well as left lateral recess stenosis that impinges on the traversing S1 nerve at the L5-S1 level.    Assessment    74-year-old female with left S1 radiculopathy  without neurologic deficit, due to lateral recess stenosis on the left at L5-S1.    Plan:      The patient would like to meet with physical therapy in the new year, and have some guidance on a home exercise routine that might benefit her.  If this fails she would be interested in an injection targeting the left S1 nerve.  She will contact us after she is tried physical therapy and given this some time, if she requires further assistance with her symptoms she will return to our clinic.    Total time of 30 minutes met with the patient today greater than 50% spent face to face in counseling and coordination of care.    Again, thank you for allowing me to participate in the care of your patient.        Sincerely,        Angel Alvarenga PA-C

## 2019-12-20 NOTE — NURSING NOTE
"Nicki Guerra's goals for this visit include: consult  She requests these members of her care team be copied on today's visit information:     PCP: Melonie Lucia    Referring Provider:  No referring provider defined for this encounter.    /75   Pulse 66   Ht 1.651 m (5' 5\")   Wt 62.1 kg (136 lb 14.4 oz)   SpO2 100%   BMI 22.78 kg/m      Do you need any medication refills at today's visit? n  "

## 2019-12-31 ENCOUNTER — THERAPY VISIT (OUTPATIENT)
Dept: PHYSICAL THERAPY | Facility: CLINIC | Age: 74
End: 2019-12-31
Attending: PHYSICIAN ASSISTANT
Payer: COMMERCIAL

## 2019-12-31 DIAGNOSIS — M54.17 LUMBOSACRAL RADICULOPATHY AT S1: ICD-10-CM

## 2019-12-31 PROCEDURE — 97161 PT EVAL LOW COMPLEX 20 MIN: CPT | Mod: GP | Performed by: PHYSICAL THERAPIST

## 2019-12-31 PROCEDURE — 97112 NEUROMUSCULAR REEDUCATION: CPT | Mod: GP | Performed by: PHYSICAL THERAPIST

## 2019-12-31 PROCEDURE — 97110 THERAPEUTIC EXERCISES: CPT | Mod: GP | Performed by: PHYSICAL THERAPIST

## 2019-12-31 NOTE — PROGRESS NOTES
[facility] Initial Evaluation  Subjective:  The history is provided by the patient. No  was used.   Nicki Guerra being seen for Direction to help with ongoing sciatic pain.   Problem began 5/10/2017. Where condition occurred: for unknown reasons.Problem occurred: Unsure at first, MRI in early Dec2019 findings indicate issue in spine.  and reported as 5/10 on pain scale. General health as reported by patient is excellent. Pertinent medical history includes:  None.  Medical allergies: other. Other medical allergies details: See listing.  Surgeries include:  Orthopedic surgery. Other surgery history details: left shoulder RC repair.  Current medications:  Anti-depressants. Other medications details: see medical file.   Primary job tasks include:  Computer work, prolonged sitting, repetitive tasks and other. Other job/home tasks details: Basic household, home tasks, variety.  Pain is described as burning, cramping, sharp and stabbing and is constant. Pain is worse during the day. Since onset symptoms are unchanged. Special tests:  MRI. Previous treatment includes physical therapy. There was mild improvement following previous treatment.   Patient is , PCA Attendant,.   Barriers include:  None as reported by patient.  Red flags:  None as reported by patient.  Type of problem:  Lumbar   Condition occurred with:  Degenerative joint disease. This is a chronic condition   Problem details: I have some back pain for a while.  The low back and gluteal pain, left greater than right  has gotten worse in the last three months. Daily left leg pain but not all the time.  When I am standing and walking, up on my legs the pain increases,.   Patient reports pain:  Central lumbar spine and lower lumbar spine. Radiates to:  Gluteals left, gluteals right, thigh left, knee left, lower leg left and foot left (bottom of the foot feels thick). Associated symptoms:  Tingling. Symptoms are  exacerbated by sitting, standing and walking (driving-1 hour) and relieved by ice (rest, stretching, ).                      Objective:  Standing Alignment:    Cervical/Thoracic:  Forward head (fair sitting posture.)  Shoulder/UE:  Rounded shoulders  Lumbar:  Lordosis decr                Flexibility/Screens:   Positive screens:  Lumbar    Lower Extremity:  Decreased left lower extremity flexibility:Hip Flexors; Quadriceps and Hamstrings    Decreased right lower extremity flexibility:  Hip Flexors; Quadriceps and Hamstrings  Spine:  Decreased left spine flexibility:  Quadratus Lumborum    Decreased right spine flexibility:  Quadratus Lumborum             Lumbar/SI Evaluation  ROM:    AROM Lumbar:   Flexion:        TROM ankle incrase in pain   Ext:                    Moderate with decrease in pain    Side Bend:        Left:     Right:   Rotation:           Left:     Right:   Side Glide:        Left:     Right:         Strength: right hamstring 5/5, left 5-/5 with discomfort,   Lumbar Myotomes:    T12-L3 (Hip Flex):  Left: 5    Right: 5  L2-4 (Quads):  Left:  5-    Right:  5  L4 (Ankle DF):  Left:  5    Right:  5  L5 (Great Toe Ext): Left: 5    Right: 5   S1 (Toe Raise):  Left: 4+    Right: 5        Neural Tension/Mobility:      Left side:Slump  negative.     Right side:   Slump  negative.   Lumbar Palpation:    Tenderness present at Left:    Erector Spinae; Piriformis; Gluteus Medius and Vertebral  Tenderness not present at Left:    Greater Trochanter  Tenderness present at Right: Erector Spinae  Tenderness not present at Right:  Gluteus Medius or Greater Trochanter  Functional Tests:  Core strength and proprioception lumbar: balance right 30 sec decrease hip control, left 20 sec         Lumbar Provocation:    Left positive with:  Mobility    Spinal Segmental Conclusions:     Level: Hypo noted at L2, L3, L4 and L5                                                   General     ROS    Assessment/Plan:    Patient is a 74  year old female with lumbar complaints.    Patient has the following significant findings with corresponding treatment plan.                Diagnosis 1:  Low back pain with left sciatica  Pain -  manual therapy, self management, education, directional preference exercise and home program  Decreased ROM/flexibility - manual therapy, therapeutic exercise, therapeutic activity and home program  Decreased joint mobility - manual therapy, therapeutic exercise, therapeutic activity and home program  Decreased strength - therapeutic exercise, therapeutic activities and home program  Impaired balance - neuro re-education, therapeutic activities and home program  Impaired muscle performance - neuro re-education and home program  Decreased function - therapeutic activities and home program  Impaired posture - neuro re-education, therapeutic activities and home program    Therapy Evaluation Codes:     Cumulative Therapy Evaluation is: Low complexity.    Previous and current functional limitations:  (See Goal Flow Sheet for this information)    Short term and Long term goals: (See Goal Flow Sheet for this information)     Communication ability:  Patient appears to be able to clearly communicate and understand verbal and written communication and follow directions correctly.  Treatment Explanation - The following has been discussed with the patient:   RX ordered/plan of care  This patient would benefit from PT intervention to resume normal activities.   Rehab potential is good.    Frequency:  1 X week, once daily  Duration:  for 10 weeks  Discharge Plan:  Achieve all LTG.  Independent in home treatment program.    Please refer to the daily flowsheet for treatment today, total treatment time and time spent performing 1:1 timed codes.

## 2020-01-06 ENCOUNTER — THERAPY VISIT (OUTPATIENT)
Dept: PHYSICAL THERAPY | Facility: CLINIC | Age: 75
End: 2020-01-06
Payer: COMMERCIAL

## 2020-01-06 DIAGNOSIS — M54.17 LUMBOSACRAL RADICULOPATHY AT S1: ICD-10-CM

## 2020-01-06 PROCEDURE — 97110 THERAPEUTIC EXERCISES: CPT | Mod: GP | Performed by: PHYSICAL THERAPY ASSISTANT

## 2020-01-06 PROCEDURE — 97112 NEUROMUSCULAR REEDUCATION: CPT | Mod: GP | Performed by: PHYSICAL THERAPY ASSISTANT

## 2020-01-13 ENCOUNTER — THERAPY VISIT (OUTPATIENT)
Dept: PHYSICAL THERAPY | Facility: CLINIC | Age: 75
End: 2020-01-13
Payer: COMMERCIAL

## 2020-01-13 DIAGNOSIS — M54.17 LUMBOSACRAL RADICULOPATHY AT S1: ICD-10-CM

## 2020-01-13 PROCEDURE — 97112 NEUROMUSCULAR REEDUCATION: CPT | Mod: CQ | Performed by: PHYSICAL THERAPY ASSISTANT

## 2020-01-13 PROCEDURE — 97110 THERAPEUTIC EXERCISES: CPT | Mod: CQ | Performed by: PHYSICAL THERAPY ASSISTANT

## 2020-01-17 DIAGNOSIS — Z12.11 SPECIAL SCREENING FOR MALIGNANT NEOPLASMS, COLON: ICD-10-CM

## 2020-01-17 PROCEDURE — 82274 ASSAY TEST FOR BLOOD FECAL: CPT | Performed by: FAMILY MEDICINE

## 2020-01-18 LAB — HEMOCCULT STL QL IA: NEGATIVE

## 2020-01-19 ENCOUNTER — MYC MEDICAL ADVICE (OUTPATIENT)
Dept: FAMILY MEDICINE | Facility: CLINIC | Age: 75
End: 2020-01-19

## 2020-01-22 ENCOUNTER — THERAPY VISIT (OUTPATIENT)
Dept: PHYSICAL THERAPY | Facility: CLINIC | Age: 75
End: 2020-01-22
Payer: COMMERCIAL

## 2020-01-22 DIAGNOSIS — M54.17 LUMBOSACRAL RADICULOPATHY AT S1: ICD-10-CM

## 2020-01-22 PROCEDURE — 97112 NEUROMUSCULAR REEDUCATION: CPT | Mod: GP | Performed by: PHYSICAL THERAPY ASSISTANT

## 2020-01-22 PROCEDURE — 97110 THERAPEUTIC EXERCISES: CPT | Mod: GP | Performed by: PHYSICAL THERAPY ASSISTANT

## 2020-02-10 ENCOUNTER — THERAPY VISIT (OUTPATIENT)
Dept: PHYSICAL THERAPY | Facility: CLINIC | Age: 75
End: 2020-02-10
Payer: COMMERCIAL

## 2020-02-10 DIAGNOSIS — M54.17 LUMBOSACRAL RADICULOPATHY AT S1: ICD-10-CM

## 2020-02-10 PROCEDURE — 97110 THERAPEUTIC EXERCISES: CPT | Mod: GP | Performed by: PHYSICAL THERAPY ASSISTANT

## 2020-02-17 ENCOUNTER — MYC MEDICAL ADVICE (OUTPATIENT)
Dept: FAMILY MEDICINE | Facility: CLINIC | Age: 75
End: 2020-02-17

## 2020-02-25 ENCOUNTER — OFFICE VISIT (OUTPATIENT)
Dept: FAMILY MEDICINE | Facility: CLINIC | Age: 75
End: 2020-02-25
Payer: COMMERCIAL

## 2020-02-25 VITALS
WEIGHT: 135 LBS | TEMPERATURE: 98.4 F | OXYGEN SATURATION: 99 % | HEART RATE: 77 BPM | HEIGHT: 64 IN | SYSTOLIC BLOOD PRESSURE: 117 MMHG | DIASTOLIC BLOOD PRESSURE: 73 MMHG | BODY MASS INDEX: 23.05 KG/M2

## 2020-02-25 DIAGNOSIS — F32.1 MODERATE MAJOR DEPRESSION (H): ICD-10-CM

## 2020-02-25 DIAGNOSIS — L21.9 SEBORRHEIC DERMATITIS: Primary | ICD-10-CM

## 2020-02-25 PROCEDURE — 99213 OFFICE O/P EST LOW 20 MIN: CPT | Performed by: INTERNAL MEDICINE

## 2020-02-25 RX ORDER — DESONIDE 0.5 MG/ML
LOTION TOPICAL 2 TIMES DAILY
Qty: 118 ML | Refills: 0 | Status: SHIPPED | OUTPATIENT
Start: 2020-02-25 | End: 2020-02-27

## 2020-02-25 RX ORDER — MUPIROCIN 20 MG/G
OINTMENT TOPICAL 3 TIMES DAILY
COMMUNITY
End: 2020-02-25

## 2020-02-25 ASSESSMENT — MIFFLIN-ST. JEOR: SCORE: 1099.42

## 2020-02-25 NOTE — PROGRESS NOTES
Subjective   Presented with rash on the face and also on the front of her neck   Noticed a rash a couple of days ago initially on the front of the neck later she noticed it under her eyes  Rash is very itchy and scaly and little erythematous  The rash on the face is mainly under the eyes but not on the cheeks  No new medications  No recent change of any laundry detergent  No other rash elsewhere on the body  General pulmonary nodule: No  Nicki RAEANN Guerra is a 74 year old female who presents to clinic today for the following health issues:    HPI   Rash  Onset: 1-2 weeks     Description:   Location: Around eyes and under the neck   Character: red, and raised in some spots   Itching (Pruritis): YES    Progression of Symptoms:  Worsening    Accompanying Signs & Symptoms:  Fever: no   Body aches or joint pain: no   Sore throat symptoms: no   Recent cold symptoms: no     History:   Previous similar rash: no    Precipitating factors:   Exposure to similar rash: no   New exposures: None, but patient has noticed possible bug bite on lower back unsure if related   Recent travel: no     Alleviating factors:  None     Therapies Tried and outcome: Mupirocin with no relief       Patient Active Problem List   Diagnosis     Sciatica     Lateral epicondylitis     Enthesopathy of hip region     Osteoporosis     Moderate major depression (H)     CARDIOVASCULAR SCREENING; LDL GOAL LESS THAN 160     Anxiety     AK (actinic keratosis)     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     Port wine stain on L posterior upper arm     Biceps tendonitis, left     Complete tear of left rotator cuff     Arthrosis of left acromioclavicular joint     Shoulder impingement, left     S/P complete repair of rotator cuff     Lumbosacral radiculopathy at S1     Past Surgical History:   Procedure Laterality Date     ARTHROTOMY SHOULDER, ROTATOR CUFF REPAIR, COMBINED Left 12/21/2018    Procedure: LEFT ROTATOR CUFF REPAIR WITH DISTAL CLAVICLE  EXCISION, ACROMIOPLASTY AND BICEPS TENODESIS;  Surgeon: Dinesh Hermosillo MD;  Location: [facility] OR     HC DILATION/CURETTAGE DIAG/THER NON OB  1969     rotator cuff repair Left 12/21/2018    Dinesh Hermosillo MD at [facility]       Social History     Tobacco Use     Smoking status: Former Smoker     Smokeless tobacco: Never Used     Tobacco comment: quit 28 yrs ago   Substance Use Topics     Alcohol use: No     Family History   Problem Relation Age of Onset     Hypertension Father      Respiratory Father         EMPHYSEMA     Depression Father      Eye Disorder Father         GLAUCOMA, CATARACTS     Gastrointestinal Disease Father         ULCER     Cardiovascular Father         HEART STENTS     C.A.D. Father         AGE 84, STENTS     Cancer Father         kidney     Breast Cancer Maternal Grandmother      Prostate Cancer Paternal Grandfather      Alzheimer Disease Mother      Arthritis Mother      Depression Mother      Psychotic Disorder Brother         SCHITZOPHRENIA     Breast Cancer Other      Gastrointestinal Disease Brother         REFLUX     Gastrointestinal Disease Brother      Bladder Cancer Brother      Alzheimer Disease Brother         early onset     Cancer - colorectal No family hx of      Thyroid Disease No family hx of      Anesthesia Reaction No family hx of              Reviewed and updated as needed this visit by Provider         Review of Systems   ROS COMP: Constitutional, HEENT, cardiovascular, pulmonary, gi and gu systems are negative, except as otherwise noted.      Objective    There were no vitals taken for this visit.  There is no height or weight on file to calculate BMI.  Physical Exam  Constitutional:       Appearance: Normal appearance.   HENT:      Head: Normocephalic.      Comments: Scaly erythematous rash noted on the front of the neck, there is also 2 patches of rash noted below both eyes  These are also erythematous and scaly  Consistent with seborrheic  dermatitis  Eyes:      Pupils: Pupils are equal, round, and reactive to light.   Neck:      Musculoskeletal: Normal range of motion and neck supple.   Cardiovascular:      Rate and Rhythm: Normal rate and regular rhythm.   Pulmonary:      Effort: Pulmonary effort is normal.      Breath sounds: Normal breath sounds.   Skin:     General: Skin is warm.   Neurological:      Mental Status: She is alert and oriented to person, place, and time.   Psychiatric:         Mood and Affect: Mood normal.         Behavior: Behavior normal.            No results found for this or any previous visit (from the past 24 hour(s)).        Assessment & Plan     1. Seborrheic dermatitis  - desonide (DESOWEN) 0.05 % external lotion; Apply topically 2 times daily  Dispense: 118 mL; Refill: 0  Instructed her not to use any other creams like vitamin E cream that she uses at home  She can use Eucerin cream    2. Moderate major depression (H)  She has her good and bad days  Currently on fluoxetine    #3.  Recent bereavement:  She lost her  of 15 years 2 weeks ago in a car accident  She seems to be coping okay with it             Return in about 3 months (around 5/25/2020).    Daniel Frank MD  [facility]

## 2020-02-25 NOTE — PATIENT INSTRUCTIONS
Patient Education     Understanding Seborrheic Dermatitis    Seborrheic dermatitis is a common type of rash that causes red, scaly, greasy skin. It occurs on skin that has oil glands. These include the face, upper chest, and scalp, where it is often called dandruff. It tends to last a long time, or go away and come back. Seborrheic dermatitis is not spread from person to person.   How to say it  [id number]  What causes seborrheic dermatitis?  Experts don't know what causes it. It may be partly caused by a type of yeast that grows on skin, along with extra oil production. Experts are still learning more. It s more common in men than women, and it can occur at any age. It happens more often in people with HIV/AIDS, Parkinson disease, alcoholic pancreatitis, hepatitis, or cancer. It can also get worse during times of stress.  Symptoms of seborrheic dermatitis  Symptoms can include skin that is:    Bumpy    Covered with yellow scales or crusts    Cracked    Greasy    Itchy    Leaking fluid    Painful    Red or orange  These symptoms can occur on skin:    Around the nose    Behind the ears    In the beard    In the eyebrows    On the scalp, also known as dandruff    On the upper chest  You may also have acne, inflamed eyelids (blepharitis), or other skin conditions at the same time.  Treatment for seborrheic dermatitis  Treatment can reduce symptoms for a period of time. The types of treatments most often used include:    Antifungal shampoo, body wash, or cream. These contain medicines such as ketoconazole, fluconazole, selenium sulfide, ciclopirox, pyrithione zinc, or tea tree oil.    Corticosteroid cream or ointment. These contain medicines such as hydrocortisone.    Calcineurin inhibitor cream or ointment. These contain medicines such as pimecrolimus or tacrolimus.    Shampoo or cream with other medicines. These contain medicines such as coal tar, salicylic acid, or zinc pyrithione.    Sodium  sulfacetemide creams and washes. These may also help.  In some cases one treatment will stop working after a while. Switching between treatments every few months may be helpful.   Wash your skin gently. You can remove scales with oil and gentle rubbing or a brush.  Living with seborrheic dermatitis  Seborrheic dermatitis is an ongoing (chronic) condition. It can go away and then come back. You will likely need to use shampoo, cream, or ointment with medicine once or twice a week. This can help to keep symptoms from coming back or getting worse.  When to call your healthcare provider  Call your healthcare provider right away if you have any of these:    Symptoms that don t get better, or get worse    New symptoms  Date Last Reviewed: 5/1/2016 2000-2019 [employer]. [address] [place] [address], [place] [address]. All rights reserved. This information is not intended as a substitute for professional medical care. Always follow your healthcare professional's instructions.

## 2020-02-25 NOTE — LETTER
My Depression Action Plan  Name: Nicki Guerra   Date of Birth 1945  Date: 2/25/2020    My doctor: Melonie Lucia   My clinic: [address] [address] [phone]          GREEN    ZONE   Good Control    What it looks like:     Things are going generally well. You have normal ups and downs. You may even feel depressed from time to time, but bad moods usually last less than a day.   What you need to do:  1. Continue to care for yourself (see self care plan)  2. Check your depression survival kit and update it as needed  3. Follow your physician s recommendations including any medication.  4. Do not stop taking medication unless you consult with your physician first.           YELLOW         ZONE Getting Worse    What it looks like:     Depression is starting to interfere with your life.     It may be hard to get out of bed; you may be starting to isolate yourself from others.    Symptoms of depression are starting to last most all day and this has happened for several days.     You may have suicidal thoughts but they are not constant.   What you need to do:     1. Call your care team. Your response to treatment will improve if you keep your care team informed of your progress. Yellow periods are signs an adjustment may need to be made.     2. Continue your self-care.  Just get dressed and ready for the day.  Don't give yourself time to talk yourself out of it.    3. Talk to someone in your support network.    4. Open up your Depression Self-Care Plan/Wellness Kit.           RED    ZONE Medical Alert - Get Help    What it looks like:     Depression is seriously interfering with your life.     You may experience these or other symptoms: You can t get out of bed most days, can t work or engage in other necessary activities, you have trouble taking care of basic hygiene, or basic responsibilities, thoughts of suicide or death that will not  go away, self-injurious behavior.     What you need to do:  1. Call your care team and request a same-day appointment. If they are not available (weekends or after hours) call your local crisis line, emergency room or 911.            Depression Self-Care Plan / Wellness Kit    Self-Care for Depression  Here s the deal. Your body and mind are really not as separate as most people think.  What you do and think affects how you feel and how you feel influences what you do and think. This means if you do things that people who feel good do, it will help you feel better.  Sometimes this is all it takes.  There is also a place for medication and therapy depending on how severe your depression is, so be sure to consult with your medical provider and/ or Behavioral Health Consultant if your symptoms are worsening or not improving.     In order to better manage my stress, I will:    Exercise  Get some form of exercise, every day. This will help reduce pain and release endorphins, the  feel good  chemicals in your brain. This is almost as good as taking antidepressants!  This is not the same as joining a gym and then never going! (they count on that by the way ) It can be as simple as just going for a walk or doing some gardening, anything that will get you moving.      Hygiene   Maintain good hygiene (get out of bed in the morning, make your bed, brush your teeth, take a shower, and get dressed like you were going to work, even if you are unemployed).  If your clothes don't fit try to get ones that do.    Diet  Strive to eat foods that are good for me, drink plenty of water, and avoid excessive sugar, caffeine, alcohol, and other mood-altering substances.  Some foods that are helpful in depression are: complex carbohydrates, B vitamins, flaxseed, fish or fish oil, fresh fruits and vegetables.    Psychotherapy  Agree to participate in Individual Therapy (if recommended).    Medication  If prescribed medications, I agree to  take them.  Missing doses can result in serious side effects.  I understand that drinking alcohol, or other illicit drug use, may cause potential side effects.  I will not stop my medication abruptly without first discussing it with my provider.    Staying Connected With Others  Stay in touch with my friends, family members, and my primary care provider/team.    Use your imagination  Be creative.  We all have a creative side; it doesn t matter if it s oil painting, sand castles, or mud pies! This will also kick up the endorphins.    Witness Beauty  (AKA stop and smell the roses) Take a look outside, even in mid-winter. Notice colors, textures. Watch the squirrels and birds.     Service to others  Be of service to others.  There is always someone else in need.  By helping others we can  get out of ourselves  and remember the really important things.  This also provides opportunities for practicing all the other parts of the program.    Humor  Laugh and be silly!  Adjust your TV habits for less news and crime-drama and more comedy.    Control your stress  Try breathing deep, massage therapy, biofeedback, and meditation. Find time to relax each day.     Crisis Text Line  http://www.crisistextline.org    The Crisis Text Line serves anyone, in any type of crisis, providing access to free, 24/7 support and information via the medium people already use and trust:    Here's how it works:  1.  Text 811-273 from anywhere in the [place], anytime, about any type of crisis.  2.  A live, trained Crisis Counselor receives the text and responds quickly.  3.  The volunteer Crisis Counselor will help you move from a 'hot moment to a cool moment'.    My support system    Clinic Contact:  Phone number:    Contact 1:  Phone number:    Contact 2:  Phone number:    [employer]/:  Phone number:    Therapist:  Phone number:    Local crisis center:    Phone number:    Other community support:  Phone number:

## 2020-02-27 ENCOUNTER — TELEPHONE (OUTPATIENT)
Dept: FAMILY MEDICINE | Facility: CLINIC | Age: 75
End: 2020-02-27

## 2020-02-27 DIAGNOSIS — L21.9 SEBORRHEIC DERMATITIS: Primary | ICD-10-CM

## 2020-02-27 RX ORDER — DIAPER,BRIEF,INFANT-TODD,DISP
EACH MISCELLANEOUS 2 TIMES DAILY
Qty: 110 G | Refills: 0 | Status: SHIPPED | OUTPATIENT
Start: 2020-02-27 | End: 2020-12-17

## 2020-02-27 NOTE — TELEPHONE ENCOUNTER
Reason for Call:  Other prescription    Detailed comments: Pt states that when she went to  the following medication it was 300 dollars out of pocket and she can't afford it. She is hoping that provider may send over a different more affordable option for her if it's possible. Thank you.    desonide (DESOWEN) 0.05 % external lotion    [facility] [address] [place] [facility] [address] [address]  [phone]    Phone Number Patient can be reached at: Home number on file [phone] (home)    Best Time: Any    Can we leave a detailed message on this number? YES    Call taken on 2/27/2020 at 2:12 PM by Hodan Richardson

## 2020-03-16 ENCOUNTER — TELEPHONE (OUTPATIENT)
Dept: FAMILY MEDICINE | Facility: CLINIC | Age: 75
End: 2020-03-16

## 2020-03-16 ENCOUNTER — OFFICE VISIT (OUTPATIENT)
Dept: URGENT CARE | Facility: URGENT CARE | Age: 75
End: 2020-03-16
Payer: COMMERCIAL

## 2020-03-16 DIAGNOSIS — J06.9 UPPER RESPIRATORY TRACT INFECTION, UNSPECIFIED TYPE: Primary | ICD-10-CM

## 2020-03-16 DIAGNOSIS — J11.1 INFLUENZA-LIKE ILLNESS: Primary | ICD-10-CM

## 2020-03-16 PROCEDURE — 99213 OFFICE O/P EST LOW 20 MIN: CPT | Performed by: PHYSICIAN ASSISTANT

## 2020-03-16 PROCEDURE — 99000 SPECIMEN HANDLING OFFICE-LAB: CPT | Performed by: PHYSICIAN ASSISTANT

## 2020-03-16 PROCEDURE — U0002 COVID-19 LAB TEST NON-CDC: HCPCS | Mod: 90 | Performed by: PHYSICIAN ASSISTANT

## 2020-03-16 NOTE — PROGRESS NOTES
Chief Complaint:    COVID-19 evaluation    HPI: Nicki Guerra is an 74 year old female who has been triaged via [facility] for possible COVID-19 testing.  Patient was not examined in clinic today.      Patient remained in their car during collection process.  Droplet precautions + contact precautions + eye protection were in effect during collection process.  PPE included gown, surgical mask, face shield, and gloves.    Patient Assessment    Patient is stable and in no respiratory distress.  Patient does not appear toxic.    Medical Decision Making:    Patient does meet criteria for COVID testing.     PLAN:    No results found for any visits on 03/16/20.     Covid-19 NP swabs collected.  These will be sent to the [facility] by lab staff.    Patient advised to stay home with mild symptoms and follow home care symptom management.    Instructions Given to Patient    Isolate Yourself:    Isolate yourself while traveling.    Do Not allow any visitors within 6 feet.    Do Not go to work or school.    Do Not go to [employer],  centers, shopping, or other public places.    Do Not shake hands.    Avoid close contact with others (hugging, kissing).    Protect Others:    Cover Your Mouth and Nose with a mask, disposable tissue or wash cloth to avoid spreading germs to others.    Wash your hands and face frequently with soap and water    Fever Medicines:    For fever relief, take acetaminophen or ibuprofen.    Treat fevers above 101  F (38.3  C) to lower fevers and make you more comfortable.     Acetaminophen (e.g., Tylenol): Take 650 mg (two 325 mg pills) by mouth every 4-6 hours as needed of regular strength Tylenol or 1,000 mg (two 500 mg pills) every 8 hours as needed of Extra Strength Tylenol.     Ibuprofen (e.g., Motrin, Advil): Take 400 mg (two 200 mg pills) by mouth every 6 hours as needed.     Acetaminophen is thought to be safer than ibuprofen or naproxen for people over 65 years old. Acetaminophen is in many OTC and  prescription medicines. It might be in more than one medicine that you are taking. You need to be careful and not take an overdose. Before taking any medicine, read all the instructions on the package.    Caution -NSAIDs (e.g., ibuprofen, naproxen): Do not take nonsteroidal anti-inflammatory drugs (NSAIDs) if you have stomach problems, kidney disease, heart failure, or other contraindications to using this type of medicine. Do not take NSAID medicines for over 7 days without consulting your PCP. Do not take NSAID medicines if you are pregnant. Do not take NSAID medicines if you are also taking blood thinners.     Thank you for limiting contact with others, wearing a simple mask to cover your cough, practice good hand hygiene habits and accessing our virtual services where possible to limit the spread of this virus.    For more information about COVID19 and options for caring for yourself at home, please visit the CDC website at https://www.cdc.gov/coronavirus/2019-ncov/about/steps-when-sick.html  For more options for care at [facility], please visit our website at [email or web address]        Shawnee Ramirez PA-C 03/16/203:37 PM

## 2020-03-16 NOTE — TELEPHONE ENCOUNTER
"\"Body aches, chills, headaches, SOB, dry cough, I do not know if I have a fever as my thermometer broke.\"    I had a rash two weeks ago which was severe.    No outside travel    No known exposure to COVID-19.     Patient is requesting to be testing for COIVID-19 and influenza.     Patient also states that she was on hold with [facility] for over 3 hours and when she logs on to do the assessment online the computer freezes when asked to log any medications.     Routing for  provider to please reach out and advise.     Marina Roland RN  [facility]    "

## 2020-03-16 NOTE — PROGRESS NOTES
Mychart message sent, over 60 with symptoms, qualifies for screening.  Will call if message goes unread.    Farhad Lynn PA-C

## 2020-03-16 NOTE — TELEPHONE ENCOUNTER
PATIENT IS PUT ON URGENT CARE FOR COVID-19 TELEPHONE SCREENING.  Juanjo Mustafa,  For 1st Floor Primary Care ([facility])

## 2020-03-21 LAB
COVID-19 VIRUS PCR TO MAYO - RESULT: NORMAL
SPECIMEN SOURCE: NORMAL

## 2020-03-22 ENCOUNTER — MYC MEDICAL ADVICE (OUTPATIENT)
Dept: FAMILY MEDICINE | Facility: CLINIC | Age: 75
End: 2020-03-22

## 2020-03-23 ENCOUNTER — MYC MEDICAL ADVICE (OUTPATIENT)
Dept: FAMILY MEDICINE | Facility: CLINIC | Age: 75
End: 2020-03-23

## 2020-03-23 NOTE — LETTER
March 23, 2020      Nicki Guerra  [address]   [place] [address]        To Whom It May Concern,     I have evaluated this patient in February  She unfortunately lost her  in a tragic road traffic accident in February and since then has been having a lot of difficulty coping with life  She is going through a grief reaction  I understand she missed some days at work on 2/22 and 2/28 in February.  I also understand she missed some more days at work in March on 3/10, 3/15, 3/21, 3/22 which is secondary to stress      Sincerely,        Melonie Lucia MD

## 2020-03-23 NOTE — TELEPHONE ENCOUNTER
Routing to provider to review and advise, see [employer] message below.    Cailin Phelps RN  [facility]

## 2020-05-11 ENCOUNTER — OFFICE VISIT (OUTPATIENT)
Dept: FAMILY MEDICINE | Facility: CLINIC | Age: 75
End: 2020-05-11
Payer: COMMERCIAL

## 2020-05-11 VITALS
WEIGHT: 134 LBS | OXYGEN SATURATION: 100 % | HEART RATE: 61 BPM | BODY MASS INDEX: 22.88 KG/M2 | DIASTOLIC BLOOD PRESSURE: 79 MMHG | HEIGHT: 64 IN | TEMPERATURE: 98.5 F | SYSTOLIC BLOOD PRESSURE: 131 MMHG | RESPIRATION RATE: 17 BRPM

## 2020-05-11 DIAGNOSIS — Z01.818 PREOP GENERAL PHYSICAL EXAM: Primary | ICD-10-CM

## 2020-05-11 DIAGNOSIS — Z23 NEED FOR TD VACCINE: ICD-10-CM

## 2020-05-11 DIAGNOSIS — F41.9 ANXIETY: ICD-10-CM

## 2020-05-11 DIAGNOSIS — H25.9 AGE-RELATED CATARACT OF BOTH EYES, UNSPECIFIED AGE-RELATED CATARACT TYPE: ICD-10-CM

## 2020-05-11 DIAGNOSIS — F32.1 MODERATE MAJOR DEPRESSION (H): ICD-10-CM

## 2020-05-11 PROCEDURE — 99214 OFFICE O/P EST MOD 30 MIN: CPT | Mod: 25 | Performed by: FAMILY MEDICINE

## 2020-05-11 PROCEDURE — 90471 IMMUNIZATION ADMIN: CPT | Performed by: FAMILY MEDICINE

## 2020-05-11 PROCEDURE — 90714 TD VACC NO PRESV 7 YRS+ IM: CPT | Performed by: FAMILY MEDICINE

## 2020-05-11 ASSESSMENT — ANXIETY QUESTIONNAIRES
7. FEELING AFRAID AS IF SOMETHING AWFUL MIGHT HAPPEN: SEVERAL DAYS
GAD7 TOTAL SCORE: 4
IF YOU CHECKED OFF ANY PROBLEMS ON THIS QUESTIONNAIRE, HOW DIFFICULT HAVE THESE PROBLEMS MADE IT FOR YOU TO DO YOUR WORK, TAKE CARE OF THINGS AT HOME, OR GET ALONG WITH OTHER PEOPLE: SOMEWHAT DIFFICULT
1. FEELING NERVOUS, ANXIOUS, OR ON EDGE: SEVERAL DAYS
5. BEING SO RESTLESS THAT IT IS HARD TO SIT STILL: NOT AT ALL
2. NOT BEING ABLE TO STOP OR CONTROL WORRYING: SEVERAL DAYS
6. BECOMING EASILY ANNOYED OR IRRITABLE: SEVERAL DAYS
3. WORRYING TOO MUCH ABOUT DIFFERENT THINGS: NOT AT ALL

## 2020-05-11 ASSESSMENT — PATIENT HEALTH QUESTIONNAIRE - PHQ9
SUM OF ALL RESPONSES TO PHQ QUESTIONS 1-9: 7
5. POOR APPETITE OR OVEREATING: NOT AT ALL

## 2020-05-11 ASSESSMENT — PAIN SCALES - GENERAL: PAINLEVEL: NO PAIN (0)

## 2020-05-11 ASSESSMENT — MIFFLIN-ST. JEOR: SCORE: 1089.88

## 2020-05-11 NOTE — NURSING NOTE
Prior to immunization administration, verified patients identity using patient s name and date of birth. Please see Immunization Activity for additional information.     Screening Questionnaire for Adult Immunization    Are you sick today?   No   Do you have allergies to medications, food, a vaccine component or latex?   YES   Have you ever had a serious reaction after receiving a vaccination?   No   Do you have a long-term health problem with heart, lung, kidney, or metabolic disease (e.g., diabetes), asthma, a blood disorder, no spleen, complement component deficiency, a cochlear implant, or a spinal fluid leak?  Are you on long-term aspirin therapy?   No   Do you have cancer, leukemia, HIV/AIDS, or any other immune system problem?   No   Do you have a parent, brother, or sister with an immune system problem?   No   In the past 3 months, have you taken medications that affect  your immune system, such as prednisone, other steroids, or anticancer drugs; drugs for the treatment of rheumatoid arthritis, Crohn s disease, or psoriasis; or have you had radiation treatments?   No   Have you had a seizure, or a brain or other nervous system problem?   No   During the past year, have you received a transfusion of blood or blood    products, or been given immune (gamma) globulin or antiviral drug?   No   For women: Are you pregnant or is there a chance you could become       pregnant during the next month?   No   Have you received any vaccinations in the past 4 weeks?   No     Immunization questionnaire was positive for at least one answer.  Notified known allergies.       Patient instructed to remain in clinic for 15 minutes afterwards, and to report any adverse reaction to me immediately.       Screening performed by Lyn Fulton on 5/11/2020 at 9:36 AM.

## 2020-05-11 NOTE — PATIENT INSTRUCTIONS
Proceed with surgery as planned.  Refill fluoxetine today.  Tetanus/Diptheria shot today.        Before Your Surgery      Call your surgeon if there is any change in your health. This includes signs of a cold or flu (such as a sore throat, runny nose, cough, rash or fever).    Do not smoke, drink alcohol or take over the counter medicine (unless your surgeon or primary care doctor tells you to) for the 24 hours before and after surgery.    If you take prescribed drugs: Follow your doctor s orders about which medicines to take and which to stop until after surgery.    Eating and drinking prior to surgery: follow the instructions from your surgeon    Take a shower or bath the night before surgery. Use the soap your surgeon gave you to gently clean your skin. If you do not have soap from your surgeon, use your regular soap. Do not shave or scrub the surgery site.  Wear clean pajamas and have clean sheets on your bed.

## 2020-05-11 NOTE — PROGRESS NOTES
[address] [address]  [phone]  Dept: [phone]    PRE-OP EVALUATION:  Today's date: 2020    Nicki Guerra (: 1945) presents for pre-operative evaluation assessment as requested by Dr. Ramirez.  She requires evaluation and anesthesia risk assessment prior to undergoing surgery/procedure for treatment of cataracts .    Proposed Surgery/ Procedure: Cataract  Date of Surgery/ Procedure: 2020  Time of Surgery/ Procedure: 7:00am  [facility]  Fax number for surgical facility:   Primary Physician: Melonie Lucia  Type of Anesthesia Anticipated: Local    Patient has a Health Care Directive or Living Will:  YES       1. NO - Do you have a history of heart attack, stroke, stent, bypass or surgery on an artery in the head, neck, heart or legs?  2. NO - Do you ever have any pain or discomfort in your chest?  3. NO - Do you have a history of  Heart Failure?  4. NO - Are you troubled by shortness of breath when: walking on the level, up a slight hill or at night?  5. NO - Do you currently have a cold, bronchitis or other respiratory infection?  6. NO - Do you have a cough, shortness of breath or wheezing?  7. NO - Do you sometimes get pains in the calves of your legs when you walk?  8. NO - Do you or anyone in your family have previous history of blood clots?  9. NO - Do you or does anyone in your family have a serious bleeding problem such as prolonged bleeding following surgeries or cuts?  10. YES - Have you ever had problems with anemia or been told to take iron pills?   Years ago.   11. NO - Have you had any abnormal blood loss such as black, tarry or bloody stools, or abnormal vaginal bleeding?  12. NO - Have you ever had a blood transfusion?  13. YES - Have you or any of your relatives ever had problems with anesthesia?  Nausea waking up from shoulder surgery.    14. NO - Do you have  sleep apnea, excessive snoring or daytime drowsiness?  15. NO - Do you have any prosthetic heart valves?  16. NO - Do you have prosthetic joints?  17. NO - Is there any chance that you may be pregnant?      HPI:     HPI related to upcoming procedure:  75 year old with cataracts bilaterally with plan for       See problem list for active medical problems.  Problems all longstanding and stable, except as noted/documented.  See ROS for pertinent symptoms related to these conditions.    DEPRESSION - Patient has a long history of Depression of moderate severity requiring medication for control with recent symptoms being stable..Current symptoms of depression include diminished interest or pleasure in activities, anxiety.   PHQ 8/14/2019 11/14/2019 5/11/2020   PHQ-9 Total Score 6 9 7   Q9: Thoughts of better off dead/self-harm past 2 weeks Not at all Several days Not at all     ALEN-7 SCORE 8/14/2019 11/14/2019 5/11/2020   Total Score - - -   Total Score 6 6 4           MEDICAL HISTORY:     Patient Active Problem List    Diagnosis Date Noted     Lumbosacral radiculopathy at S1 12/31/2019     Priority: Medium     S/P complete repair of rotator cuff 12/31/2018     Priority: Medium     Arthrosis of left acromioclavicular joint 12/11/2018     Priority: Medium     Shoulder impingement, left 12/11/2018     Priority: Medium     Biceps tendonitis, left 11/20/2018     Priority: Medium     Complete tear of left rotator cuff 11/20/2018     Priority: Medium     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 Provider to review       Enthesopathy of hip region      Priority: Medium      Past Medical History:   Diagnosis Date     Allergic rhinitis, cause unspecified      Depressive disorder, not elsewhere classified      Enthesopathy of hip region      Family history of psychiatric condition      Head injury, unspecified      Hypotension, unspecified      Lateral epicondylitis  of elbow      Premenopausal menorrhagia      Sciatica      Past Surgical History:   Procedure Laterality Date     ARTHROTOMY SHOULDER, ROTATOR CUFF REPAIR, COMBINED Left 12/21/2018    Procedure: LEFT ROTATOR CUFF REPAIR WITH DISTAL CLAVICLE EXCISION, ACROMIOPLASTY AND BICEPS TENODESIS;  Surgeon: Dinesh Hermosillo MD;  Location: [facility] OR     HC DILATION/CURETTAGE DIAG/THER NON OB  1969     rotator cuff repair Left 12/21/2018    Dinesh Herm[facility]illo MD at [facility]     Current Outpatient Medications   Medication Sig Dispense Refill     CALCIUM + D OR Take 600 mg by mouth        FLUoxetine (PROZAC) 20 MG capsule TAKE ONE CAPSULE BY MOUTH EVERY MORNING  0     hydrocortisone (CORTAID) 1 % external ointment Apply topically 2 times daily 110 g 0     LUTEIN PO        MULTIVITAMINS OR 1 daily       Omega-3 Fatty Acids (OMEGA 3 PO)        VITAMIN C CR 1000 MG OR TBCR 1 TABLET DAILY       OTC products: None, except as noted above    Allergies   Allergen Reactions     Ibuprofen Swelling     Oxycodone Hcl Diarrhea and Nausea and Vomiting     Penicillins Hives      Latex Allergy: NO    Social History     Tobacco Use     Smoking status: Former Smoker     Smokeless tobacco: Never Used     Tobacco comment: quit 28 yrs ago   Substance Use Topics     Alcohol use: No     History   Drug Use No       REVIEW OF  "SYSTEMS:   CONSTITUTIONAL: NEGATIVE for fever, chills, change in weight  INTEGUMENTARY/SKIN: NEGATIVE for worrisome rashes, moles or lesions  ENT/MOUTH: NEGATIVE for ear, mouth and throat problems  RESP: NEGATIVE for significant cough or SOB  CV: NEGATIVE for chest pain, palpitations or peripheral edema  GI: NEGATIVE for nausea, abdominal pain, heartburn, or change in bowel habits  : NEGATIVE for frequency, dysuria, or hematuria  MUSCULOSKELETAL: NEGATIVE for significant arthralgias or myalgia.  Occasional pain in the low back to left buttock and leg  NEURO: NEGATIVE for weakness, dizziness or paresthesias  ENDOCRINE: NEGATIVE for temperature intolerance, skin/hair changes  HEME: NEGATIVE for bleeding problems  PSYCHIATRIC: NEGATIVE for changes in mood or affect    EXAM:   /79 (BP Location: Right arm, Patient Position: Chair, Cuff Size: Adult Regular)   Pulse 61   Temp 98.5  F (36.9  C) (Oral)   Resp 17   Ht 1.613 m (5' 3.5\")   Wt 60.8 kg (134 lb)   LMP  (Exact Date)   SpO2 100%   Breastfeeding No   BMI 23.36 kg/m      GENERAL APPEARANCE: healthy, alert and no distress     HENT: ear canals and TM's normal and nose and mouth without ulcers or lesions     NECK: no adenopathy, no asymmetry, masses, or scars and thyroid normal to palpation     RESP: lungs clear to auscultation - no rales, rhonchi or wheezes     CV: regular rates and rhythm, normal S1 S2, no S3 or S4 and no murmur, click or rub     ABDOMEN:  soft, nontender, no HSM or masses and bowel sounds normal     MS: extremities normal- no gross deformities noted, no evidence of inflammation in joints, FROM in all extremities.     SKIN: no suspicious lesions or rashes     NEURO: Normal strength and tone, sensory exam grossly normal, mentation intact and speech normal     PSYCH: mentation appears normal. and affect normal/bright     LYMPHATICS: No cervical adenopathy    DIAGNOSTICS:   No labs or EKG required for low risk surgery (cataract, skin " procedure, breast biopsy, etc)    Recent Labs   Lab Test 12/11/19  1105 11/14/19  0902 12/10/18  1119   HGB 13.5 12.8 12.7    232 217   NA  --  136 137   POTASSIUM  --  4.4 4.2   CR  --  0.66 0.68        IMPRESSION:   Reason for surgery/procedure: Cataract // removal cataract with lens implant  Diagnosis/reason for consult: surgical clearance    The proposed surgical procedure is considered LOW risk.    REVISED CARDIAC RISK INDEX  The patient has the following serious cardiovascular risks for perioperative complications such as (MI, PE, VFib and 3  AV Block):  No serious cardiac risks  INTERPRETATION: 0 risks: Class I (very low risk - 0.4% complication rate)    The patient has the following additional risks for perioperative complications:  No identified additional risks      ICD-10-CM    1. Preop general physical exam  Z01.818    2. Age-related cataract of both eyes, unspecified age-related cataract type  H25.9    3. Moderate major depression (H)  F32.1 FLUoxetine (PROZAC) 20 MG capsule   4. Anxiety  F41.9 FLUoxetine (PROZAC) 20 MG capsule   5. Need for Td vaccine  Z23 TD PRESERV FREE, IM (7+ YRS)       RECOMMENDATIONS:         --Patient is to take all scheduled medications on the day of surgery    APPROVAL GIVEN to proceed with proposed procedure, without further diagnostic evaluation       Signed Electronically by: Melonie Lucia MD    Copy of this evaluation report is provided to requesting physician.    [place] Preop Guidelines    Revised Cardiac Risk Index

## 2020-05-12 ENCOUNTER — TELEPHONE (OUTPATIENT)
Dept: FAMILY MEDICINE | Facility: CLINIC | Age: 75
End: 2020-05-12

## 2020-05-12 ASSESSMENT — ANXIETY QUESTIONNAIRES: GAD7 TOTAL SCORE: 4

## 2020-05-12 NOTE — TELEPHONE ENCOUNTER
Fax Number for [facility] to send pre-op info for pt. [phone]. Pt called to give the updated fax number.

## 2020-05-19 PROBLEM — M54.17 LUMBOSACRAL RADICULOPATHY AT S1: Status: RESOLVED | Noted: 2019-12-31 | Resolved: 2020-05-19

## 2020-05-19 NOTE — PROGRESS NOTES
Discharge Note    Progress reporting period is from initial evaluation date (please see noted date below) to Feb 10, 2020.  Linked Episodes   Type: Episode: Status: Noted: Resolved: Last update: Updated by:   PHYSICAL THERAPY low back and left leg pain 12/31/2019 Active 12/31/2019  2/10/2020 11:43 AM Flor Ferro PT      Comments:       Nicki failed to follow up and current status is unknown.  Please see information below for last relevant information on current status.  Patient seen for 5 visits.    SUBJECTIVE  Subjective changes noted by patient:  L sciatic symptoms have been present with prolonged walking. Constant soreness in low back. R knee has been bothering, typically with walking.   .  Current pain level is 7/10.     Previous pain level was  7/10.   Changes in function:  Yes (See Goal flowsheet attached for changes in current functional level)  Adverse reaction to treatment or activity: None    OBJECTIVE  Changes noted in objective findings: LROM WNL, end range tightness with all motions. Pt responded to mini press-ups and seated flexion with OP. Seated L LE dural stretching increased nerve pain. Pt demonstrated good posture throughout session. Oswestry remains the same at 26%.      ASSESSMENT/PLAN  Diagnosis: low back with left sciatica   Updated problem list and treatment plan:   Pain - HEP  Decreased ROM/flexibility - HEP  Decreased function - HEP  Decreased strength - HEP  Impaired gait - HEP  STG/LTGs have been met or progress has been made towards goals:  Yes, please see goal flowsheet for most current information  Assessment of Progress: current status is unknown.    Last current status: Pt is progressing slower than anticipated   Self Management Plans:  HEP  I have re-evaluated this patient and find that the nature, scope, duration and intensity of the therapy is appropriate for the medical condition of the patient.  Nicki continues to require the following intervention to meet STG and  LTG's:  HEP.    Recommendations:  Discharge with current home program.  Patient to follow up with MD as needed.  Patient did not complete full course of care due to the death of her .    Please refer to the daily flowsheet for treatment today, total treatment time and time spent performing 1:1 timed codes.

## 2020-08-26 ENCOUNTER — HOSPITAL ENCOUNTER (EMERGENCY)
Facility: CLINIC | Age: 75
Discharge: HOME | End: 2020-08-26
Attending: PHYSICIAN ASSISTANT | Admitting: PHYSICIAN ASSISTANT
Payer: COMMERCIAL

## 2020-08-26 VITALS
TEMPERATURE: 96.6 F | OXYGEN SATURATION: 98 % | BODY MASS INDEX: 20.83 KG/M2 | DIASTOLIC BLOOD PRESSURE: 86 MMHG | HEIGHT: 65 IN | WEIGHT: 125 LBS | SYSTOLIC BLOOD PRESSURE: 135 MMHG | RESPIRATION RATE: 16 BRPM | HEART RATE: 75 BPM

## 2020-08-26 DIAGNOSIS — T67.1XXA HEAT SYNCOPE, INITIAL ENCOUNTER: ICD-10-CM

## 2020-08-26 LAB
ANION GAP SERPL CALCULATED.3IONS-SCNC: 10 MMOL/L (ref 3–14)
BASOPHILS # BLD AUTO: 0 10E9/L (ref 0–0.2)
BASOPHILS NFR BLD AUTO: 0.1 %
BUN SERPL-MCNC: 9 MG/DL (ref 7–30)
CALCIUM SERPL-MCNC: 8.2 MG/DL (ref 8.5–10.1)
CHLORIDE SERPL-SCNC: 105 MMOL/L (ref 94–109)
CO2 SERPL-SCNC: 21 MMOL/L (ref 20–32)
CREAT SERPL-MCNC: 0.77 MG/DL (ref 0.52–1.04)
DIFFERENTIAL METHOD BLD: NORMAL
EOSINOPHIL # BLD AUTO: 0.1 10E9/L (ref 0–0.7)
EOSINOPHIL NFR BLD AUTO: 1.2 %
ERYTHROCYTE [DISTWIDTH] IN BLOOD BY AUTOMATED COUNT: 12.9 % (ref 10–15)
GFR SERPL CREATININE-BSD FRML MDRD: 76 ML/MIN/{1.73_M2}
GLUCOSE SERPL-MCNC: 97 MG/DL (ref 70–99)
HCT VFR BLD AUTO: 35.8 % (ref 35–47)
HGB BLD-MCNC: 12.5 G/DL (ref 11.7–15.7)
IMM GRANULOCYTES # BLD: 0 10E9/L (ref 0–0.4)
IMM GRANULOCYTES NFR BLD: 0.1 %
INTERPRETATION ECG - MUSE: NORMAL
LYMPHOCYTES # BLD AUTO: 1.3 10E9/L (ref 0.8–5.3)
LYMPHOCYTES NFR BLD AUTO: 17.5 %
MCH RBC QN AUTO: 30.8 PG (ref 26.5–33)
MCHC RBC AUTO-ENTMCNC: 34.9 G/DL (ref 31.5–36.5)
MCV RBC AUTO: 88 FL (ref 78–100)
MONOCYTES # BLD AUTO: 0.8 10E9/L (ref 0–1.3)
MONOCYTES NFR BLD AUTO: 11.2 %
NEUTROPHILS # BLD AUTO: 5 10E9/L (ref 1.6–8.3)
NEUTROPHILS NFR BLD AUTO: 69.9 %
PLATELET # BLD AUTO: 209 10E9/L (ref 150–450)
POTASSIUM SERPL-SCNC: 3.3 MMOL/L (ref 3.4–5.3)
RBC # BLD AUTO: 4.06 10E12/L (ref 3.8–5.2)
SODIUM SERPL-SCNC: 136 MMOL/L (ref 133–144)
WBC # BLD AUTO: 7.2 10E9/L (ref 4–11)

## 2020-08-26 PROCEDURE — 85025 COMPLETE CBC W/AUTO DIFF WBC: CPT | Performed by: PHYSICIAN ASSISTANT

## 2020-08-26 PROCEDURE — 96360 HYDRATION IV INFUSION INIT: CPT

## 2020-08-26 PROCEDURE — 25800030 ZZH RX IP 258 OP 636: Performed by: PHYSICIAN ASSISTANT

## 2020-08-26 PROCEDURE — 96361 HYDRATE IV INFUSION ADD-ON: CPT

## 2020-08-26 PROCEDURE — 80048 BASIC METABOLIC PNL TOTAL CA: CPT | Performed by: PHYSICIAN ASSISTANT

## 2020-08-26 PROCEDURE — 99284 EMERGENCY DEPT VISIT MOD MDM: CPT | Mod: 25

## 2020-08-26 PROCEDURE — 93005 ELECTROCARDIOGRAM TRACING: CPT

## 2020-08-26 RX ADMIN — SODIUM CHLORIDE 1000 ML: 9 INJECTION, SOLUTION INTRAVENOUS at 18:02

## 2020-08-26 ASSESSMENT — ENCOUNTER SYMPTOMS
ABDOMINAL PAIN: 0
VOMITING: 1
DIZZINESS: 1
SEIZURES: 0
NAUSEA: 1
LIGHT-HEADEDNESS: 1

## 2020-08-26 ASSESSMENT — MIFFLIN-ST. JEOR: SCORE: 1072.88

## 2020-08-26 NOTE — ED TRIAGE NOTES
Pt presents to the ED via EMS for evaluation of heat exhaustion and near syncopal episode. Pt was with her grandchildren at a [employer] course. Per EMS, pt was outside for an hour and a half and reporting not feeling well. Pt reports nausea and one episode of emesis. Blood sugar: 132.

## 2020-08-26 NOTE — ED NOTES
Bed: ED28  Expected date: 8/26/20  Expected time: 5:24 PM  Means of arrival: Ambulance  Comments:  7452 75f heat exhaustion ETA 3200

## 2020-08-26 NOTE — ED AVS SNAPSHOT
Emergency Department  [address] [facility] [address]  Phone:  [phone]  Fax:  [phone]                                    Nicki Guerra   MRN: [id number]    Department:   Emergency Department   Date of Visit:  8/26/2020           After Visit Summary Signature Page    I have received my discharge instructions, and my questions have been answered. I have discussed any challenges I see with this plan with the nurse or doctor.    ..........................................................................................................................................  Patient/Patient Representative Signature      ..........................................................................................................................................  Patient Representative Print Name and Relationship to Patient    ..................................................               ................................................  Date                                   Time    ..........................................................................................................................................  Reviewed by Signature/Title    ...................................................              ..............................................  Date                                               Time          22EPIC Rev 08/18

## 2020-08-26 NOTE — ED PROVIDER NOTES
"  History     Chief Complaint:  Lightheadedness       The history is provided by the patient.      Nicki Guerra is a 75 year old female who presents for evaluation of lightheadedness and possible syncope. The patient states that she was zip lining when she began to feel lightheaded and dizzy and possible \"blacked out.\" She states that she was outside for two and a half hours prior to this and believes that she got too hot, as similar episodes have occurred in the past due to heat. She states that she has had some nausea with a single episode of emesis. She denies any abdominal pain, chest pain or seizure activity.    Allergies:  Ibuprofen  Oxycodone Hcl  Penicillins     Medications:    Fluoxetine  Lutein    Past Medical History:    Depressive disorder  Enthesopathy of hip region  Sciatica    Past Surgical History:    Arthrotomy shoulder, rotator cuff repair, combined, left  Dilation and curettage  Rotator cuff repair, left     Family History:    Hypertension  Emphysema  Depression  Glaucoma  Stomach ulcers  Heart stents  CAD  Kidney cancer  Alzheimer disease  Arthritis  Depression  Schizophrenia  GERD  Bladder cancer     Social History:  The patient presents to the ED alone.  Smoking Status: Former Smoker  Smokeless Tobacco: Never Used  Alcohol Use: Yes  Drug Use: No  PCP: Melonie Lucia     Review of Systems   Cardiovascular: Negative for chest pain.   Gastrointestinal: Positive for nausea and vomiting. Negative for abdominal pain.   Neurological: Positive for dizziness, syncope (possible) and light-headedness. Negative for seizures.   All other systems reviewed and are negative.    Physical Exam     Patient Vitals for the past 24 hrs:   BP Temp Temp src Pulse Resp SpO2 Height Weight   08/26/20 1753 136/71 96.6  F (35.9  C) Tympanic 68 16 100 % 1.651 m (5' 5\") 56.7 kg (125 lb)       Physical Exam  General: Alert, interactive. GCS 15  Head:  Scalp is atraumatic.  Eyes:  EOM intact. The pupils are equal, round, " and reactive to light. No scleral icterus.   ENT:                                      Ears:  The external ears are normal.   Nose:  The external nose is normal.  Throat:  The oropharynx is normal. Mucus membranes are dry.                 Neck:  Normal range of motion. There is no rigidity.   CV:  Regular rate and rhythm. No murmur. 2+ radial pulses  Resp:  Breath sounds are clear bilaterally. Non-labored, no retractions or accessory muscle use.  GI:  Abdomen is soft, no distension, no tenderness.   MS:  Normal range of motion.   Skin:  Warm and dry.   Neuro:  Strength and sensation grossly intact.   Psych:  Awake. Alert.  Appropriate interactions.     Emergency Department Course     ECG:  Indication: Lightheadedness  Time: 1812  Vent. Rate 68 bpm. CT interval 152. QRS duration 84. QT/QTc 464/493. P-R-T axis 57 69 65. Normal sinus rhythm. Prolonged QT. Abnormal ECG.  Read time: 1818 by Dr. Anaya      Laboratory:  Laboratory findings were communicated with the patient who voiced understanding of the findings.    CBC: WBC: 7.2, HGB: 12.5, PLT: 209    BMP: Potassium 3.3 (low), Calcium 8.2 (low) o/w WNL (Creatinine: 0.77)     Interventions:  1802 NS 1L IV    Emergency Department Course:  Past medical records, nursing notes, and vitals reviewed.    1750 I performed an exam of the patient as documented above.     EKG obtained in the ED, see results above.     IV was inserted and blood was drawn for laboratory testing, results above.    1935 I rechecked the patient who is feeling improved and was able to ambulate without difficulty. At this point I feel that the patient is safe for discharge, and the patient agrees.     Findings and plan explained to the patient. Patient discharged home with instructions regarding supportive care, medications, and reasons to return. The importance of close follow-up was reviewed.    I personally reviewed the laboratory results with the patient and answered all related questions prior  to discharge.     Impression & Plan     Medical Decision Making:  Nicki Guerra is a 75 year old female who presents for evaluation of lightheadedness and likely syncope after being in the heat for an extended period while zip lining. The differential for syncope is broad and includes etiologies such as cardiac arrhythmia, ACS, aortic stenosis, PE, orthostatic hypotension, drugs, situational, carotid hypersensitivity, seizure, TIA/stroke, vasovagal. Given history, suspect syncope due to heat exposure. There is no chest pain, no seizure activity or post-ictal period, no murmur, and no focal neurologic symptoms, and no complaints of concerning headache. The patient notes that she has had syncope episodes in the past caused by heat and has low concern for other causes today. The patient felt improved after fluids here and was able to ambulate without difficulty. The workup here is negative outside of a mild hypokalemia at 3.3, however the physical exam is re-assurring. Follow up with PCP in 3-5 days for continued symptoms or sooner if worsening.  Return to the ED if develops numbness, weakness, shortness of breath, visual changes, or for other concerns. The patient understood the plan and was discharged home in good condition. All questions answered.     Diagnosis:    ICD-10-CM    1. Heat syncope, initial encounter  T67.1XXA        Disposition:  Discharged to home.    Scribe Disclosure:  I, Hadley Saldivar, am serving as a scribe at 6:22 PM on 8/26/2020 to document services personally performed by Sheila Cruz PA-C based on my observations and the provider's statements to me.      Sheila Cruz PA-C  08/26/20 2013

## 2020-08-27 ENCOUNTER — DOCUMENTATION ONLY (OUTPATIENT)
Dept: OTHER | Facility: CLINIC | Age: 75
End: 2020-08-27

## 2020-11-06 DIAGNOSIS — F32.1 MODERATE MAJOR DEPRESSION (H): ICD-10-CM

## 2020-11-06 DIAGNOSIS — F41.9 ANXIETY: ICD-10-CM

## 2020-11-09 DIAGNOSIS — Z12.31 VISIT FOR SCREENING MAMMOGRAM: ICD-10-CM

## 2020-11-09 NOTE — TELEPHONE ENCOUNTER
PHQ 8/14/2019 11/14/2019 5/11/2020   PHQ-9 Total Score 6 9 7   Q9: Thoughts of better off dead/self-harm past 2 weeks Not at all Several days Not at all     Routing refill request to provider for review/approval because:  PHQ-9 is 5 or more. Per protocol, RN cannot refill if PHQ-9 is over 4.        Margie Baez RN, BSN, PHN

## 2020-11-19 ENCOUNTER — MYC MEDICAL ADVICE (OUTPATIENT)
Dept: FAMILY MEDICINE | Facility: CLINIC | Age: 75
End: 2020-11-19

## 2020-11-20 ENCOUNTER — TELEPHONE (OUTPATIENT)
Dept: FAMILY MEDICINE | Facility: CLINIC | Age: 75
End: 2020-11-20

## 2020-11-20 ENCOUNTER — VIRTUAL VISIT (OUTPATIENT)
Dept: FAMILY MEDICINE | Facility: CLINIC | Age: 75
End: 2020-11-20
Payer: COMMERCIAL

## 2020-11-20 DIAGNOSIS — R94.31 PROLONGED Q-T INTERVAL ON ECG: ICD-10-CM

## 2020-11-20 DIAGNOSIS — S05.12XA TRAUMATIC ECCHYMOSIS OF LEFT ORBIT, INITIAL ENCOUNTER: Primary | ICD-10-CM

## 2020-11-20 DIAGNOSIS — R55 SYNCOPE, UNSPECIFIED SYNCOPE TYPE: ICD-10-CM

## 2020-11-20 PROCEDURE — 99214 OFFICE O/P EST MOD 30 MIN: CPT | Mod: 95 | Performed by: INTERNAL MEDICINE

## 2020-11-20 NOTE — PROGRESS NOTES
"Nicki Guerra is a 75 year old female who is being evaluated via a billable video visit.      The patient has been notified of following:     \"This video visit will be conducted via a call between you and your physician/provider. We have found that certain health care needs can be provided without the need for an in-person physical exam.  This service lets us provide the care you need with a video conversation.  If a prescription is necessary we can send it directly to your pharmacy.  If lab work is needed we can place an order for that and you can then stop by our lab to have the test done at a later time.    Video visits are billed at different rates depending on your insurance coverage.  Please reach out to your insurance provider with any questions.    If during the course of the call the physician/provider feels a video visit is not appropriate, you will not be charged for this service.\"    Patient has given verbal consent for Video visit? Yes  How would you like to obtain your AVS? [employer]     Please send video link to cell phone not [employer].  If you are dropped from the video visit, the video invite should be resent to: Text to cell phone: [phone]   Will anyone else be joining your video visit? No      Subjective     Nicki Guerra is a 75 year old female who presents today via video visit for the following health issues:    HPI    Last Wednesday had a meal and after that she felt like opening her bowels  While she was going into the bathroom she started feeling hot and fell down  She passed out transiently for a few seconds  When she woke up she still felt dizzy  She was also feeling dizzy if she lifts her head up  She went to bed that day and she started noticing yesterday that she was having black eye on the left side  There is a bruising that come up on the left eye around the left orbit       Current Outpatient Medications   Medication     CALCIUM + D OR     FLUoxetine (PROZAC) 20 MG capsule "     LUTEIN PO     MULTIVITAMINS OR     Omega-3 Fatty Acids (OMEGA 3 PO)     hydrocortisone (CORTAID) 1 % external ointment     VITAMIN C CR 1000 MG OR TBCR     No current facility-administered medications for this visit.      Past Medical History:   Diagnosis Date     Allergic rhinitis, cause unspecified      Depressive disorder, not elsewhere classified      Enthesopathy of hip region      Family history of psychiatric condition      Head injury, unspecified      Hypotension, unspecified      Lateral epicondylitis  of elbow      Premenopausal menorrhagia      Sciatica      Video Start Time: 10.37 AM        Review of Systems   Constitutional, HEENT, cardiovascular, pulmonary, gi and gu systems are negative, except as otherwise noted.      Objective           Vitals:  No vitals were obtained today due to virtual visit.    Physical Exam     GENERAL: Healthy, alert and no distress  EYES: Bruising noted around left orbit  She also has a swelling on the left frontal area which is a small swelling  RESP: No audible wheeze, cough, or visible cyanosis.  No visible retractions or increased work of breathing.    SKIN: Visible skin clear. No significant rash, abnormal pigmentation or lesions.  NEURO: Cranial nerves grossly intact.  Mentation and speech appropriate for age.  PSYCH: Mentation appears normal, affect normal/bright, judgement and insight intact, normal speech and appearance well-groomed.              Assessment & Plan     Traumatic ecchymosis of left orbit, initial encounter  She has some pain behind left eye  She appears to have a small subgaleal hematoma on the left frontal area  We will get a CT head as this is a head trauma to make sure there is no underlying subdural hematoma  She is not on any anticoagulants  - CT Head w/o Contrast; Future    Syncope, unspecified syncope type  This is happening more frequently  She had an earlier episode this year in August when she was taken to from the hospital  EKG at that  point showed prolonged QT at 493 MS  I am concerned that although the syncope could be just vasovagal in nature as she feels hot before it happens, it is happening more frequently without any precipitating factors and she might need to have a tilt table test  - CARDIOLOGY EVAL ADULT REFERRAL; Future    Prolonged Q-T interval on ECG  As above  - CARDIOLOGY EVAL ADULT REFERRAL; Future            Return in about 3 months (around 2/20/2021).    Daniel Frank MD  [facility]      Video-Visit Details    Type of service:  Video Visit    Video End Time:10.55 AM    Originating Location (pt. Location): Home    Distant Location (provider location):  [facility]     Platform used for Video Visit: [employer]

## 2020-11-20 NOTE — TELEPHONE ENCOUNTER
.Reason for Call:    orders for CT scan    Detailed comments: Patient just had a virtual visit with Dr Frank at which time dicussed having a CT scan as soon as possible and [facility] does not have that order just the cardiology order/referral; Thank you    Phone Number Patient can be reached at: Home number on file [phone] (home)    Best Time: anytime soon please    Can we leave a detailed message on this number? YES    Call taken on 11/20/2020 at 11:03 AM by Marnie Hardwick

## 2020-11-23 ENCOUNTER — ANCILLARY PROCEDURE (OUTPATIENT)
Dept: CT IMAGING | Facility: CLINIC | Age: 75
End: 2020-11-23
Attending: INTERNAL MEDICINE
Payer: COMMERCIAL

## 2020-11-23 ENCOUNTER — MYC MEDICAL ADVICE (OUTPATIENT)
Dept: FAMILY MEDICINE | Facility: CLINIC | Age: 75
End: 2020-11-23

## 2020-11-23 DIAGNOSIS — S05.12XA TRAUMATIC ECCHYMOSIS OF LEFT ORBIT, INITIAL ENCOUNTER: ICD-10-CM

## 2020-11-23 PROCEDURE — 70450 CT HEAD/BRAIN W/O DYE: CPT | Performed by: RADIOLOGY

## 2020-11-25 ENCOUNTER — MYC MEDICAL ADVICE (OUTPATIENT)
Dept: FAMILY MEDICINE | Facility: CLINIC | Age: 75
End: 2020-11-25

## 2020-12-03 ENCOUNTER — TELEPHONE (OUTPATIENT)
Dept: FAMILY MEDICINE | Facility: CLINIC | Age: 75
End: 2020-12-03

## 2020-12-03 DIAGNOSIS — F32.1 MODERATE MAJOR DEPRESSION (H): ICD-10-CM

## 2020-12-03 DIAGNOSIS — F41.9 ANXIETY: ICD-10-CM

## 2020-12-04 NOTE — TELEPHONE ENCOUNTER
Routing refill request to provider for review/approval because:  PHQ-9 is overdue      Margie Baez RN, BSN, PHN

## 2020-12-04 NOTE — TELEPHONE ENCOUNTER
Given 15 days- if schedules appointment can get enough med to make it to appointment- assist with scheduling follow up with Dr. Lucia

## 2020-12-04 NOTE — TELEPHONE ENCOUNTER
This writer attempted to contact pt on 12/04/20      Reason for call schedule visit and left message.  Myc message also sent to pt    If patient calls back:   Schedule virtual appointment within 2 weeks with PCP, document that pt called and if schedules appointment can get enough med to make it to appointment, route to karey Painter

## 2020-12-16 DIAGNOSIS — F32.1 MODERATE MAJOR DEPRESSION (H): ICD-10-CM

## 2020-12-16 DIAGNOSIS — F41.9 ANXIETY: ICD-10-CM

## 2020-12-17 ENCOUNTER — OFFICE VISIT (OUTPATIENT)
Dept: FAMILY MEDICINE | Facility: CLINIC | Age: 75
End: 2020-12-17
Payer: COMMERCIAL

## 2020-12-17 VITALS
RESPIRATION RATE: 18 BRPM | SYSTOLIC BLOOD PRESSURE: 136 MMHG | WEIGHT: 133 LBS | TEMPERATURE: 98 F | BODY MASS INDEX: 22.71 KG/M2 | OXYGEN SATURATION: 98 % | DIASTOLIC BLOOD PRESSURE: 74 MMHG | HEIGHT: 64 IN | HEART RATE: 58 BPM

## 2020-12-17 DIAGNOSIS — F32.1 MODERATE MAJOR DEPRESSION (H): Primary | ICD-10-CM

## 2020-12-17 DIAGNOSIS — R30.0 DYSURIA: ICD-10-CM

## 2020-12-17 DIAGNOSIS — F41.9 ANXIETY: ICD-10-CM

## 2020-12-17 LAB
ALBUMIN UR-MCNC: NEGATIVE MG/DL
APPEARANCE UR: CLEAR
BILIRUB UR QL STRIP: NEGATIVE
COLOR UR AUTO: YELLOW
GLUCOSE UR STRIP-MCNC: NEGATIVE MG/DL
HGB UR QL STRIP: ABNORMAL
KETONES UR STRIP-MCNC: NEGATIVE MG/DL
LEUKOCYTE ESTERASE UR QL STRIP: NEGATIVE
NITRATE UR QL: NEGATIVE
NON-SQ EPI CELLS #/AREA URNS LPF: NORMAL /LPF
PH UR STRIP: 6.5 PH (ref 5–7)
RBC #/AREA URNS AUTO: NORMAL /HPF
SOURCE: ABNORMAL
SP GR UR STRIP: <=1.005 (ref 1–1.03)
UROBILINOGEN UR STRIP-ACNC: 0.2 EU/DL (ref 0.2–1)
WBC #/AREA URNS AUTO: NORMAL /HPF

## 2020-12-17 PROCEDURE — 96127 BRIEF EMOTIONAL/BEHAV ASSMT: CPT | Performed by: FAMILY MEDICINE

## 2020-12-17 PROCEDURE — 99214 OFFICE O/P EST MOD 30 MIN: CPT | Performed by: FAMILY MEDICINE

## 2020-12-17 PROCEDURE — 81001 URINALYSIS AUTO W/SCOPE: CPT | Performed by: FAMILY MEDICINE

## 2020-12-17 ASSESSMENT — MIFFLIN-ST. JEOR: SCORE: 1085.34

## 2020-12-17 ASSESSMENT — PAIN SCALES - GENERAL: PAINLEVEL: NO PAIN (0)

## 2020-12-17 NOTE — PROGRESS NOTES
Subjective     Nicki Guerra is a 75 year old female who presents to clinic today for the following health issues:    HPI         Depression and Anxiety Follow-Up    How are you doing with your depression since your last visit? No change    How are you doing with your anxiety since your last visit?  No change    Are you having other symptoms that might be associated with depression or anxiety? No    Have you had a significant life event? No     Do you have any concerns with your use of alcohol or other drugs? No    Social History     Tobacco Use     Smoking status: Former Smoker     Smokeless tobacco: Never Used     Tobacco comment: quit 28 yrs ago   Substance Use Topics     Alcohol use: Yes     Drug use: No     PHQ 11/14/2019 5/11/2020 12/18/2020   PHQ-9 Total Score 9 7 2   Q9: Thoughts of better off dead/self-harm past 2 weeks Several days Not at all Not at all     ALEN-7 SCORE 11/14/2019 5/11/2020 12/18/2020   Total Score - - -   Total Score 6 4 1         Suicide Assessment Five-step Evaluation and Treatment (SAFE-T)      How many servings of fruits and vegetables do you eat daily?  4 or more    On average, how many sweetened beverages do you drink each day (Examples: soda, juice, sweet tea, etc.  Do NOT count diet or artificially sweetened beverages)?   0    How many days per week do you exercise enough to make your heart beat faster? 7    How many minutes a day do you exercise enough to make your heart beat faster? 20 - 29    How many days per week do you miss taking your medication? 0  Walking.  Tolerated well.    Dysuria:  Patient has noted mild twinge of discomfort with urinating this AM then seemed to resolve then recurrent again.  No back pain, no fever, no blood in urine or other symptoms.     Review of Systems   Constitutional, HEENT, cardiovascular, pulmonary, gi and gu systems are negative, except as otherwise noted.      Objective    /74   Pulse 58   Temp 98  F (36.7  C)   Resp 18   Ht  "1.613 m (5' 3.5\")   Wt 60.3 kg (133 lb)   SpO2 98%   BMI 23.19 kg/m    Body mass index is 23.19 kg/m .  Physical Exam   GENERAL: healthy, alert and no distress  NECK: no adenopathy, no asymmetry, masses, or scars and thyroid normal to palpation  RESP: lungs clear to auscultation - no rales, rhonchi or wheezes  CV: regular rate and rhythm, normal S1 S2, no S3 or S4, no murmur, click or rub, no peripheral edema and peripheral pulses strong  ABDOMEN: soft, nontender, no hepatosplenomegaly, no masses and bowel sounds normal  MS: no gross musculoskeletal defects noted, no edema  PSYCH: mentation appears normal, affect normal/bright    Diagnostics:        Component      Latest Ref Rng & Units 12/17/2020   Color Urine       Yellow   Appearance Urine       Clear   Glucose Urine      NEG:Negative mg/dL Negative   Bilirubin Urine      NEG:Negative Negative   Ketones Urine      NEG:Negative mg/dL Negative   Specific Gravity Urine      1.003 - 1.035 <=1.005   Blood Urine      NEG:Negative Trace (A)   pH Urine      5.0 - 7.0 pH 6.5   Protein Albumin Urine      NEG:Negative mg/dL Negative   Urobilinogen Urine      0.2 - 1.0 EU/dL 0.2   Nitrite Urine      NEG:Negative Negative   Leukocyte Esterase Urine      NEG:Negative Negative   Source       Midstream Urine   WBC Urine      OTO5:0 - 5 /HPF 0 - 5   RBC Urine      OTO2:O - 2 /HPF O - 2   Squamous Epithelial /LPF Urine      FEW:Few /LPF Few       Assessment & Plan     Moderate major depression (H)  Anxiety  Symptoms are well controlled with current treatment.  Continue same.  Refill sent.    - FLUoxetine (PROZAC) 20 MG capsule; Take 1 capsule (20 mg) by mouth daily  - EMOTIONAL / BEHAVIORAL ASSESSMENT    Dysuria  urinalysis normal on testing today.  Monitor for persistent or worsening symptoms.   - *UA reflex to Microscopic and Culture ([place] and [facility] (except [place] and [place])  - Urine Microscopic        Patient Instructions   Refill fluoxetine now.      Lab " today for bladder symptoms.  I will send results over Bet[employer] to you when available.      Return in about 9 months (around 9/17/2021).    Melonie Lucia MD  [facility]

## 2020-12-17 NOTE — PATIENT INSTRUCTIONS
Refill fluoxetine now.      Lab today for bladder symptoms.  I will send results over echoecho to you when available.

## 2020-12-18 ASSESSMENT — ANXIETY QUESTIONNAIRES
GAD7 TOTAL SCORE: 1
6. BECOMING EASILY ANNOYED OR IRRITABLE: SEVERAL DAYS
3. WORRYING TOO MUCH ABOUT DIFFERENT THINGS: NOT AT ALL
5. BEING SO RESTLESS THAT IT IS HARD TO SIT STILL: NOT AT ALL
7. FEELING AFRAID AS IF SOMETHING AWFUL MIGHT HAPPEN: NOT AT ALL
1. FEELING NERVOUS, ANXIOUS, OR ON EDGE: NOT AT ALL
IF YOU CHECKED OFF ANY PROBLEMS ON THIS QUESTIONNAIRE, HOW DIFFICULT HAVE THESE PROBLEMS MADE IT FOR YOU TO DO YOUR WORK, TAKE CARE OF THINGS AT HOME, OR GET ALONG WITH OTHER PEOPLE: NOT DIFFICULT AT ALL
2. NOT BEING ABLE TO STOP OR CONTROL WORRYING: NOT AT ALL

## 2020-12-18 ASSESSMENT — PATIENT HEALTH QUESTIONNAIRE - PHQ9
SUM OF ALL RESPONSES TO PHQ QUESTIONS 1-9: 2
5. POOR APPETITE OR OVEREATING: NOT AT ALL

## 2020-12-19 ASSESSMENT — ANXIETY QUESTIONNAIRES: GAD7 TOTAL SCORE: 1

## 2021-01-05 ENCOUNTER — TELEPHONE (OUTPATIENT)
Dept: CARDIOLOGY | Facility: CLINIC | Age: 76
End: 2021-01-05

## 2021-01-05 DIAGNOSIS — Z13.6 CARDIOVASCULAR SCREENING; LDL GOAL LESS THAN 160: Primary | ICD-10-CM

## 2021-01-05 DIAGNOSIS — R55 SYNCOPE: ICD-10-CM

## 2021-01-12 ENCOUNTER — OFFICE VISIT (OUTPATIENT)
Dept: CARDIOLOGY | Facility: CLINIC | Age: 76
End: 2021-01-12
Payer: COMMERCIAL

## 2021-01-12 VITALS
SYSTOLIC BLOOD PRESSURE: 115 MMHG | BODY MASS INDEX: 21.83 KG/M2 | HEIGHT: 65 IN | HEART RATE: 73 BPM | WEIGHT: 131 LBS | DIASTOLIC BLOOD PRESSURE: 69 MMHG

## 2021-01-12 DIAGNOSIS — R55 VASOVAGAL SYNCOPE: Primary | ICD-10-CM

## 2021-01-12 PROCEDURE — 93000 ELECTROCARDIOGRAM COMPLETE: CPT | Performed by: INTERNAL MEDICINE

## 2021-01-12 PROCEDURE — 99204 OFFICE O/P NEW MOD 45 MIN: CPT | Performed by: INTERNAL MEDICINE

## 2021-01-12 ASSESSMENT — MIFFLIN-ST. JEOR: SCORE: 1100.09

## 2021-01-12 NOTE — PROGRESS NOTES
HPI and Plan:   See dictation [id number]    Orders Placed This Encounter   Procedures     EKG 12-lead complete w/read - Clinics (future- to be scheduled)       No orders of the defined types were placed in this encounter.      There are no discontinued medications.      Encounter Diagnosis   Name Primary?     Syncope Yes       CURRENT MEDICATIONS:  Current Outpatient Medications   Medication Sig Dispense Refill     CALCIUM + D OR Take 600 mg by mouth        FLUoxetine (PROZAC) 20 MG capsule Take 1 capsule (20 mg) by mouth daily 90 capsule 2     LUTEIN PO        MULTIVITAMINS OR 1 daily       Omega-3 Fatty Acids (OMEGA 3 PO)          ALLERGIES     Allergies   Allergen Reactions     Ibuprofen Swelling     Oxycodone Hcl Diarrhea and Nausea and Vomiting     Penicillins Hives       PAST MEDICAL HISTORY:  Past Medical History:   Diagnosis Date     Allergic rhinitis, cause unspecified      Depressive disorder, not elsewhere classified      Enthesopathy of hip region      Family history of psychiatric condition      Head injury, unspecified      Hypotension, unspecified      Lateral epicondylitis  of elbow      Premenopausal menorrhagia      Sciatica        PAST SURGICAL HISTORY:  Past Surgical History:   Procedure Laterality Date     ARTHROTOMY SHOULDER, ROTATOR CUFF REPAIR, COMBINED Left 12/21/2018    Procedure: LEFT ROTATOR CUFF REPAIR WITH DISTAL CLAVICLE EXCISION, ACROMIOPLASTY AND BICEPS TENODESIS;  Surgeon: Dinesh Hermosillo MD;  Location: [facility] OR     HC DILATION/CURETTAGE DIAG/THER NON OB  1969     rotator cuff repair Left 12/21/2018    Dinesh Hermosillo MD at [facility]       FAMILY HISTORY:  Family History   Problem Relation Age of Onset     Hypertension Father      Respiratory Father         EMPHYSEMA     Depression Father      Eye Disorder Father         GLAUCOMA, CATARACTS     Gastrointestinal Disease Father         ULCER     Cardiovascular Father         HEART STENTS     C.A.D. Father         AGE 84,  STENTS     Cancer Father         kidney     Breast Cancer Maternal Grandmother      Prostate Cancer Paternal Grandfather      Alzheimer Disease Mother      Arthritis Mother      Depression Mother      Psychotic Disorder Brother         SCHITZOPHRENIA     Breast Cancer Other      Gastrointestinal Disease Brother         REFLUX     Gastrointestinal Disease Brother      Bladder Cancer Brother      Alzheimer Disease Brother         early onset     Cancer - colorectal No family hx of      Thyroid Disease No family hx of      Anesthesia Reaction No family hx of        SOCIAL HISTORY:  Social History     Socioeconomic History     Marital status:      Spouse name: None     Number of children: None     Years of education: None     Highest education level: None   Occupational History     Occupation: [employer]     Employer: [employer]     Occupation:      Employer: [employer]     Occupation: working in [employer]   Social Needs     Financial resource strain: None     Food insecurity     Worry: None     Inability: None     Transportation needs     Medical: None     Non-medical: None   Tobacco Use     Smoking status: Former Smoker     Smokeless tobacco: Never Used     Tobacco comment: quit 28 yrs ago   Substance and Sexual Activity     Alcohol use: Yes     Drug use: No     Sexual activity: Yes     Partners: Male   Lifestyle     Physical activity     Days per week: None     Minutes per session: None     Stress: None   Relationships     Social connections     Talks on phone: None     Gets together: None     Attends Latter day service: None     Active member of club or organization: None     Attends meetings of clubs or organizations: None     Relationship status: None     Intimate partner violence     Fear of current or ex partner: None     Emotionally abused: None     Physically abused: None     Forced sexual activity: None   Other Topics Concern      Service No     Blood Transfusions No     Caffeine  Concern No     Occupational Exposure No     Hobby Hazards No     Sleep Concern Yes     Stress Concern Yes     Weight Concern No     Special Diet No     Back Care Yes     Exercise Yes     Comment: 1-2 x per week     Bike Helmet No     Seat Belt Yes     Self-Exams No     Parent/sibling w/ CABG, MI or angioplasty before 65F 55M? No   Social History Narrative    Remarried in 2004.  Does do [employer] work       Review of Systems:  Skin:  Negative       Eyes:  Positive for glasses    ENT:  Negative      Respiratory:  Negative       Cardiovascular:    Positive for;syncope or near-syncope    Gastroenterology: Positive for diarrhea    Genitourinary:  Negative      Musculoskeletal:  Positive for arthritis    Neurologic:  Positive for seizures    Psychiatric:  Positive for anxiety;depression    Heme/Lymph/Imm:  Negative      Endocrine:  Negative

## 2021-01-12 NOTE — PROGRESS NOTES
Service Date: 01/12/2021      HISTORY OF PRESENT ILLNESS:    It was my pleasure seeing Ms. Nicki Guerra, a delightful 75-year-old female who has been referred by Dr. Melonie Lucia for evaluation of syncope.      Ms. Guerra has been healthy throughout her life.  However, she has suffered a number of fainting spells over the years.  Her first event was at age 15.  She had about 6 additional events in the subsequent years, but later remained syncope-free for a long time.  On 08/26/2020 she was outside, vigorously exerting as part of a [employer] adventure, when she bumped her head.  Shortly thereafter she felt unwell, nauseated and dizzy.  Ultimately, she fainted and was brought to the hospital.  In the emergency room, evaluation revealed sinus rhythm with a prolonged QT interval of 490 milliseconds.  Her potassium was 3.3.  She was discharged home later in the day.      Then, in 11/2020, she felt the urge to go to the bathroom.  While on the toilet, she felt warm and had to remove clothes as she was becoming too hot.  She felt like passing out and wanted to lie down.  Next thing she recalls, is waking up on the floor of her bathroom.  She felt nauseated.     Within 48 hours after the most recent syncopal event, she developed bruising around her eye.  She went to see her primary care provider who referred her to us.      Over the years, her typical trigger for this type of fainting event has been GI discomfort.  Sudden pain or venipuncture does not trigger syncope.      The patient is quite active.  She is a personal care attendant.  She was  last year and currently lives alone.  She is a nonsmoker and drinks alcohol rarely.      FAMILY HISTORY:  Negative for premature heart disease.        PHYSICAL EXAMINATION:   VITAL SIGNS:  Blood pressure 115/69, pulse 73 and regular, weight 59 kg, height 165 cm.   GENERAL:  She is a pleasant, healthy-appearing woman who provides an accurate thorough history.   NECK:  Supple  without carotid bruits.   LUNGS:  Clear.  No crackles or wheezes.   CARDIOVASCULAR:  Normal JVP, regular rhythm, no gallop, murmur or rub.   ABDOMEN:  Soft, nontender.   EXTREMITIES:  No edema.      DIAGNOSTIC STUDIES:     Recent laboratory tests:  Sodium 136, potassium 3.3, creatinine 0.77, hematocrit 35.8%.   Her 12-lead ECG today showed sinus rhythm and was within normal limits.  His ECG from the emergency room on 08/26/2020 is described in the HPI.  All other ECG from 12/2018 showed sinus rhythm, normal QT, within normal limits.        IMPRESSION:    1.  Neurally-mediated (vasovagal) syncope.  Ms. Guerra has classic history for neurally-mediated syncope with typical trigger being GI discomfort.  Patients prone to vasovagal syncope often experience events in their teens/20s and then later in their 70s.  The patient's clinical course fits this pattern.      There is no evidence of structural heart disease by physical exam and ECG.  An echocardiogram is reasonable to rule out structural heart abnormalities.  Her abnormal ECG in 08/2020 (prolonged QT) was in the setting of hypokalemia.  Mild hypokalemia plus fluoxetine likely accounted for this isolated abnormal tracing.  There is no aspect of her personal or family history that suggests LQTS.  Other ECG tracings, including the one from today, show normal QT.       The patient was reassured.  She typically has warning before she faints, which is good.  We discussed maintenance of good hydration as well as physical maneuvers useful to abort syncopal episodes.      The patient had several questions.  All were answered.      RECOMMENDATIONS:   1.  Echocardiogram.   2.  As above.  Maintain good hydration.  Obtain supine position ASAP for warning signs of an upcoming fainting spell.      I appreciate the opportunity to be involved in this delightful patient's care.  Please feel free to contact me with any questions.        YESENIA DUMONT MD, FACC          cc:       Melonie Lucia MD   [facility]   [address] [place] [address]             D: 2021   T: 2021   MT: ZOE      Name:     EMI JEFFREY   MRN:      [id number]        Account:      [id number]   :      1945           Service Date: 2021      Document: [id number]

## 2021-01-12 NOTE — LETTER
1/12/2021    Melonie Lucia MD  [address]  [facility] [place] [address]    RE: Nicki Guerra       Dear Colleague,    I had the pleasure of seeing Nicki Guerra in the [facility].    HPI and Plan:   See dictation [id number]    Orders Placed This Encounter   Procedures     EKG 12-lead complete w/read - Clinics (future- to be scheduled)       No orders of the defined types were placed in this encounter.      There are no discontinued medications.      Encounter Diagnosis   Name Primary?     Syncope Yes       CURRENT MEDICATIONS:  Current Outpatient Medications   Medication Sig Dispense Refill     CALCIUM + D OR Take 600 mg by mouth        FLUoxetine (PROZAC) 20 MG capsule Take 1 capsule (20 mg) by mouth daily 90 capsule 2     LUTEIN PO        MULTIVITAMINS OR 1 daily       Omega-3 Fatty Acids (OMEGA 3 PO)          ALLERGIES     Allergies   Allergen Reactions     Ibuprofen Swelling     Oxycodone Hcl Diarrhea and Nausea and Vomiting     Penicillins Hives       PAST MEDICAL HISTORY:  Past Medical History:   Diagnosis Date     Allergic rhinitis, cause unspecified      Depressive disorder, not elsewhere classified      Enthesopathy of hip region      Family history of psychiatric condition      Head injury, unspecified      Hypotension, unspecified      Lateral epicondylitis  of elbow      Premenopausal menorrhagia      Sciatica        PAST SURGICAL HISTORY:  Past Surgical History:   Procedure Laterality Date     ARTHROTOMY SHOULDER, ROTATOR CUFF REPAIR, COMBINED Left 12/21/2018    Procedure: LEFT ROTATOR CUFF REPAIR WITH DISTAL CLAVICLE EXCISION, ACROMIOPLASTY AND BICEPS TENODESIS;  Surgeon: Dinesh Hermosillo MD;  Location: [facility] OR     HC DILATION/CURETTAGE DIAG/THER NON OB  1969     rotator cuff repair Left 12/21/2018    Dinesh Hermosillo MD at [facility]       FAMILY HISTORY:  Family History   Problem Relation Age of Onset     Hypertension Father      Respiratory Father          EMPHYSEMA     Depression Father      Eye Disorder Father         GLAUCOMA, CATARACTS     Gastrointestinal Disease Father         ULCER     Cardiovascular Father         HEART STENTS     C.A.D. Father         AGE 84, STENTS     Cancer Father         kidney     Breast Cancer Maternal Grandmother      Prostate Cancer Paternal Grandfather      Alzheimer Disease Mother      Arthritis Mother      Depression Mother      Psychotic Disorder Brother         SCHITZOPHRENIA     Breast Cancer Other      Gastrointestinal Disease Brother         REFLUX     Gastrointestinal Disease Brother      Bladder Cancer Brother      Alzheimer Disease Brother         early onset     Cancer - colorectal No family hx of      Thyroid Disease No family hx of      Anesthesia Reaction No family hx of        SOCIAL HISTORY:  Social History     Socioeconomic History     Marital status:      Spouse name: None     Number of children: None     Years of education: None     Highest education level: None   Occupational History     Occupation: [employer]     Employer: [employer]     Occupation:      Employer: [employer]     Occupation: working in [place]   Social Needs     Financial resource strain: None     Food insecurity     Worry: None     Inability: None     Transportation needs     Medical: None     Non-medical: None   Tobacco Use     Smoking status: Former Smoker     Smokeless tobacco: Never Used     Tobacco comment: quit 28 yrs ago   Substance and Sexual Activity     Alcohol use: Yes     Drug use: No     Sexual activity: Yes     Partners: Male   Lifestyle     Physical activity     Days per week: None     Minutes per session: None     Stress: None   Relationships     Social connections     Talks on phone: None     Gets together: None     Attends Mandaen service: None     Active member of club or organization: None     Attends meetings of clubs or organizations: None     Relationship status: None     Intimate partner  violence     Fear of current or ex partner: None     Emotionally abused: None     Physically abused: None     Forced sexual activity: None   Other Topics Concern      Service No     Blood Transfusions No     Caffeine Concern No     Occupational Exposure No     Hobby Hazards No     Sleep Concern Yes     Stress Concern Yes     Weight Concern No     Special Diet No     Back Care Yes     Exercise Yes     Comment: 1-2 x per week     Bike Helmet No     Seat Belt Yes     Self-Exams No     Parent/sibling w/ CABG, MI or angioplasty before 65F 55M? No   Social History Narrative    Remarried in 2004.  Does do [employer] work       Review of Systems:  Skin:  Negative       Eyes:  Positive for glasses    ENT:  Negative      Respiratory:  Negative       Cardiovascular:    Positive for;syncope or near-syncope    Gastroenterology: Positive for diarrhea    Genitourinary:  Negative      Musculoskeletal:  Positive for arthritis    Neurologic:  Positive for seizures    Psychiatric:  Positive for anxiety;depression    Heme/Lymph/Imm:  Negative      Endocrine:  Negative                        Thank you for allowing me to participate in the care of your patient.      Sincerely,     Ezra Handy MD     [facility]    cc:   No referring provider defined for this encounter.

## 2021-01-12 NOTE — LETTER
1/12/2021      Melonie Lucia MD  [address]  [facility] [address]      RE: Nicki uGerra       Dear Colleague,    I had the pleasure of seeing Nicki Guerra in the [facility].    Service Date: 01/12/2021      HISTORY OF PRESENT ILLNESS:  It was my pleasure seeing Ms. Nicki Guerra, a delightful 75-year-old female who has been referred by Dr. Melonie Lucia for evaluation of syncope.      Ms. Guerra has a history of anxiety for which she takes fluoxetine.  Otherwise, she has been remarkably healthy throughout her life and has no chronic medical disease of significance.      She has had a number of fainting spells over the years.  She said that she had her first fainting spell at age 15.  She had probably 6 clovis syncopal events over the subsequent many years, but then had no episodes until 08/26/2020.  On that day, she was outside, vigorously exerting moving from rope to rope (part of a zipline adventure) when she recalls hitting her head on the rope and shortly thereafter feeling unwell, nauseous and ended up becoming very dizzy.  Ultimately, she fainted and she was brought to the hospital.  In the emergency room, evaluation revealed sinus rhythm with a prolonged QT interval of approximately 490 milliseconds and a potassium of 3.3.  She was sent home later in the day.      Then, in 11/2020, she felt the urge to go to the bathroom.  While in the bathroom, she felt very warm and she told me she literally had to remove clothes as she was getting very hot.  She then felt like passing out and wanted to lie down, but did not have time to do that.  She woke up, finding herself wedged on the floor in her bathroom.  She was quite nauseous afterwards.  Of note, she was nauseated around the event of 08/2020 as well.      Within 48 hours after the most recent syncopal event, she developed bruising around her eye and requested followup with her primary care provider, who referred  to Cardiology.      Over the years, the most typical trigger for this type of fainting event has been GI complaints.  Sudden pain or venipuncture and does not trigger syncope.      The patient is quite active.  She is a personal care attendant.  She was  last year and currently lives alone.  She is a nonsmoker and drinks alcohol rarely.      FAMILY HISTORY:  Negative for premature heart disease.      PHYSICAL EXAMINATION:   VITAL SIGNS:  Blood pressure 115/69, pulse 73 and regular, weight 59 kg, height 165 cm.   GENERAL:  She is a pleasant, healthy-appearing woman who provides an accurate thorough history.   NECK:  Supple without carotid bruits.   LUNGS:  Clear.  No crackles or wheezes.   CARDIOVASCULAR:  Normal JVP, regular rhythm, no gallop, murmur or rub.   ABDOMEN:  Soft, nontender.   EXTREMITIES:  No edema.      DIAGNOSTIC STUDIES:     Recent laboratory tests:  Sodium 136, potassium 3.3, creatinine 0.77, hematocrit 35.8%.   Her 12-lead ECG today showed sinus rhythm and was within normal limits.  His ECG from the emergency room on 08/26/2020 is described in the HPI.  All other ECG from 12/2018 showed sinus rhythm, normal QT, within normal limits.      IMPRESSION:  Neurally mediated (vasovagal) syncope.  Ms. Guerra has classic history for neurally mediated syncope with a typical trigger being GI discomfort.  Quite commonly patients prone to vasovagal syncope tend to have events in their early teens/20s and then subsequently in their 70s.  The patient's course is very typical of his pattern.      She has no evidence of structural heart disease by physical exam and ECG.  An echocardiogram is reasonable to rule out any structural heart abnormality.  I will note that the abnormal ECG in 08/2020 showed prolonged QT, but this was in the setting of hypokalemia.  Mild hypokalemia plus fluoxetine may have accounted for that single abnormal tracing.  There is no aspect other aspect of her personal or family history  that suggest LQTS.      The patient was reassured.  She appears to have warning before she actually faints, which is obviously very good.  We discussed good hydration as well as maneuvers to prevent syncopal episodes (such as lying down ASAP should she have warning symptoms and if not possible to lie down, trial of arm and leg tension maneuvers to increase vascular resistance and possibly abort fainting).      The patient had several questions and all were answered.      RECOMMENDATIONS:   1.  Echocardiogram.   2.  See above.  Maintain good hydration and get into the supine position ASAP if warning signs for an upcoming fainting spell.      I do appreciate the opportunity to be involved in this delightful patient's care.  Please feel free to contact me with any questions.      cc:      Melonie Lucia MD   [address] [address]         YESENIA HANDY MD             D: 2021   T: 2021   MT:       Name:     EMI JEFFREY   MRN:      [id number]        Account:      [id number]   :      1945           Service Date: 2021      Document: [id number]        Outpatient Encounter Medications as of 2021   Medication Sig Dispense Refill     CALCIUM + D OR Take 600 mg by mouth        FLUoxetine (PROZAC) 20 MG capsule Take 1 capsule (20 mg) by mouth daily 90 capsule 2     LUTEIN PO        MULTIVITAMINS OR 1 daily       Omega-3 Fatty Acids (OMEGA 3 PO)        No facility-administered encounter medications on file as of 2021.        Again, thank you for allowing me to participate in the care of your patient.      Sincerely,    Yesenia Handy MD     [facility]

## 2021-01-15 ENCOUNTER — TELEPHONE (OUTPATIENT)
Dept: CARDIOLOGY | Facility: CLINIC | Age: 76
End: 2021-01-15

## 2021-01-15 ENCOUNTER — HEALTH MAINTENANCE LETTER (OUTPATIENT)
Age: 76
End: 2021-01-15

## 2021-01-15 ENCOUNTER — HOSPITAL ENCOUNTER (OUTPATIENT)
Dept: CARDIOLOGY | Facility: CLINIC | Age: 76
Discharge: HOME | End: 2021-01-15
Attending: INTERNAL MEDICINE | Admitting: INTERNAL MEDICINE
Payer: COMMERCIAL

## 2021-01-15 DIAGNOSIS — Z13.6 CARDIOVASCULAR SCREENING; LDL GOAL LESS THAN 160: ICD-10-CM

## 2021-01-15 DIAGNOSIS — R55 SYNCOPE: ICD-10-CM

## 2021-01-15 PROCEDURE — 93306 TTE W/DOPPLER COMPLETE: CPT

## 2021-01-15 PROCEDURE — 93306 TTE W/DOPPLER COMPLETE: CPT | Mod: 26 | Performed by: INTERNAL MEDICINE

## 2021-05-14 ENCOUNTER — DOCUMENTATION ONLY (OUTPATIENT)
Dept: OTHER | Facility: CLINIC | Age: 76
End: 2021-05-14

## 2021-08-16 ENCOUNTER — TELEPHONE (OUTPATIENT)
Dept: FAMILY MEDICINE | Facility: CLINIC | Age: 76
End: 2021-08-16

## 2021-08-16 NOTE — TELEPHONE ENCOUNTER
Patient Quality Outreach      Summary:    Patient has the following on her problem list/HM:     Depression / Dysthymia review    6 Month Remission: 4-8 month window range:   12 Month Remission: 10-14 month window range:        PHQ-9 SCORE 11/14/2019 5/11/2020 12/18/2020   PHQ-9 Total Score - - -   PHQ-9 Total Score 9 7 2       If PHQ-9 recheck is 5 or more, route to provider for next steps.    Patient is due/failing the following:   Colonoscopy    Type of outreach:    Sent [employer] message.    Questions for provider review:    None                                                                                                                                     Tiffany Berkowitz

## 2021-08-26 ENCOUNTER — OFFICE VISIT (OUTPATIENT)
Dept: FAMILY MEDICINE | Facility: CLINIC | Age: 76
End: 2021-08-26
Payer: COMMERCIAL

## 2021-08-26 ENCOUNTER — TELEPHONE (OUTPATIENT)
Dept: FAMILY MEDICINE | Facility: CLINIC | Age: 76
End: 2021-08-26

## 2021-08-26 VITALS
TEMPERATURE: 98.7 F | HEART RATE: 64 BPM | SYSTOLIC BLOOD PRESSURE: 116 MMHG | DIASTOLIC BLOOD PRESSURE: 75 MMHG | BODY MASS INDEX: 22.47 KG/M2 | RESPIRATION RATE: 12 BRPM | WEIGHT: 135 LBS | OXYGEN SATURATION: 98 %

## 2021-08-26 DIAGNOSIS — S56.129A FLEXOR TENDON LACERATION OF FINGER WITH OPEN WOUND, INITIAL ENCOUNTER: ICD-10-CM

## 2021-08-26 DIAGNOSIS — S61.209A FLEXOR TENDON LACERATION OF FINGER WITH OPEN WOUND, INITIAL ENCOUNTER: ICD-10-CM

## 2021-08-26 DIAGNOSIS — Z01.818 PREOP GENERAL PHYSICAL EXAM: Primary | ICD-10-CM

## 2021-08-26 PROCEDURE — 99214 OFFICE O/P EST MOD 30 MIN: CPT | Performed by: NURSE PRACTITIONER

## 2021-08-26 PROCEDURE — 36415 COLL VENOUS BLD VENIPUNCTURE: CPT | Performed by: NURSE PRACTITIONER

## 2021-08-26 PROCEDURE — 84132 ASSAY OF SERUM POTASSIUM: CPT | Performed by: NURSE PRACTITIONER

## 2021-08-26 PROCEDURE — 82565 ASSAY OF CREATININE: CPT | Performed by: NURSE PRACTITIONER

## 2021-08-26 ASSESSMENT — PATIENT HEALTH QUESTIONNAIRE - PHQ9: SUM OF ALL RESPONSES TO PHQ QUESTIONS 1-9: 6

## 2021-08-26 NOTE — TELEPHONE ENCOUNTER
The patient is calling to check and see if the [facility] received a fax from [facility] for a Covid order?  This is for her upcoming surgery on 8/31/21 with her surgeon Dr. Garner, outside of [employer].  She states that someone called her today from [employer] and told her the order was faxed and she needs to to schedule a covid test that has to be done on 8/27/21. She wants to be worked into the schedule if it is full.  Please call the patient ASAP at [phone], okay to leave a message.  Thank you.  Chichi Mckee,  [facility]

## 2021-08-26 NOTE — PROGRESS NOTES
[address], [address]  [facility] [address]  Phone: [phone]  Primary Provider: Melonie Lucia  Pre-op Performing Provider: ASAD MACHUCA      PREOPERATIVE EVALUATION:  Today's date: 8/26/2021    Nicki Guerra is a 76 year old female who presents for a preoperative evaluation.    Surgical Information:  Surgery/Procedure: Repair left flexor tendon  Surgery Location: [facility]  Surgeon: Dr. Garner  Surgery Date: TBD  Time of Surgery: TBD  Where patient plans to recover: At home with family  Fax number for surgical facility: [phone]    Type of Anesthesia Anticipated: General    Assessment & Plan     The proposed surgical procedure is considered INTERMEDIATE risk.    Problem List Items Addressed This Visit     None      Visit Diagnoses     Preop general physical exam    -  Primary    Relevant Orders    Potassium    Creatinine    Flexor tendon laceration of finger with open wound, initial encounter                   Risks and Recommendations:  The patient has the following additional risks and recommendations for perioperative complications:   - No identified additional risk factors other than previously addressed    Medication Instructions:  Patient is to take all scheduled medications on the day of surgery EXCEPT for modifications listed below:  hold vitamins    RECOMMENDATION:  APPROVAL GIVEN to proceed with proposed procedure, without further diagnostic evaluation.        Subjective     HPI related to upcoming procedure: going for flexor tendon repair   Has not had covid vaccine and will not be getting it   No complaints today and is feeling well       Preop Questions 8/25/2021   1. Have you ever had a heart attack or stroke? No   2. Have you ever had surgery on your heart or blood vessels, such as a stent placement, a coronary artery bypass, or surgery on an artery in your head, neck, heart, or legs? No   3. Do you have chest pain with  activity? No   4. Do you have a history of  heart failure? No   5. Do you currently have a cold, bronchitis or symptoms of other infection? No   6. Do you have a cough, shortness of breath, or wheezing? No   7. Do you or anyone in your family have previous history of blood clots? No   8. Do you or does anyone in your family have a serious bleeding problem such as prolonged bleeding following surgeries or cuts? No   9. Have you ever had problems with anemia or been told to take iron pills? No   10. Have you had any abnormal blood loss such as black, tarry or bloody stools, or abnormal vaginal bleeding? No   11. Have you ever had a blood transfusion? No   12. Are you willing to have a blood transfusion if it is medically needed before, during, or after your surgery? Yes   13. Have you or any of your relatives ever had problems with anesthesia? No   14. Do you have sleep apnea, excessive snoring or daytime drowsiness? No   15. Do you have any artifical heart valves or other implanted medical devices like a pacemaker, defibrillator, or continuous glucose monitor? No   16. Do you have artificial joints? No   17. Are you allergic to latex? No       Health Care Directive:  Patient has a Health Care Directive on file      Preoperative Review of :   reviewed - no record of controlled substances prescribed.      Status of Chronic Conditions:  See problem list for active medical problems.  Problems all longstanding and stable, except as noted/documented.  See ROS for pertinent symptoms related to these conditions.      Review of Systems  Constitutional, neuro, ENT, endocrine, pulmonary, cardiac, gastrointestinal, genitourinary, musculoskeletal, integument and psychiatric systems are negative, except as otherwise noted.    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 Provider to review       Enthesopathy of hip region      Priority: Medium      Past Medical History:   Diagnosis Date     Allergic rhinitis, cause unspecified      Depressive disorder Nicki RAEANN Guerra     Depressive disorder, not elsewhere classified      Enthesopathy of hip region      Family history of psychiatric condition      Head injury, unspecified      Hypotension, unspecified      Lateral epicondylitis  of elbow      Premenopausal menorrhagia      Sciatica      Past Surgical History:   Procedure Laterality Date     ARTHROTOMY SHOULDER, ROTATOR CUFF REPAIR, COMBINED Left 12/21/2018    Procedure: LEFT ROTATOR CUFF REPAIR WITH DISTAL CLAVICLE EXCISION, ACROMIOPLASTY AND BICEPS TENODESIS;  Surgeon: Dinesh Hermosillo MD;  Location: [facility] OR     COLONOSCOPY  2000?    See your records     HC DILATION/CURETTAGE DIAG/THER NON OB  1969     rotator cuff repair Left 12/21/2018    Dinesh Hermosillo MD at [facility]     Current Outpatient Medications   Medication Sig Dispense Refill     CALCIUM + D OR Take 600 mg by mouth         FLUoxetine (PROZAC) 20 MG capsule Take 1 capsule (20 mg) by mouth daily 90 capsule 2     LUTEIN PO        MULTIVITAMINS OR 1 daily         Allergies   Allergen Reactions     Ibuprofen Swelling     Oxycodone Hcl Diarrhea and Nausea and Vomiting     Penicillins Hives        Social History     Tobacco Use     Smoking status: Former Smoker     Packs/day: 2.00     Years: 10.00     Pack years: 20.00     Types: Cigarettes     Start date: 10/20/1960     Quit date: 1977     Years since quittin.3     Smokeless tobacco: Never Used     Tobacco comment: Too much, thank God that s done   Substance Use Topics     Alcohol use: Yes     Comment: Seldom, one glass wine with a meal now and then     Family History   Problem Relation Age of Onset     Hypertension Father      Respiratory Father         EMPHYSEMA     Depression Father      Eye Disorder Father         GLAUCOMA, CATARACTS     Gastrointestinal Disease Father         ULCER     Cardiovascular Father         HEART STENTS     C.A.D. Father         AGE 84, STENTS     Cancer Father         kidney     Breast Cancer Maternal Grandmother      Prostate Cancer Paternal Grandfather      Alzheimer Disease Mother      Arthritis Mother      Depression Mother      Osteoporosis Mother      Psychotic Disorder Brother         SCHITZOPHRENIA     Mental Illness Brother      Breast Cancer Other      Gastrointestinal Disease Brother         REFLUX     Other Cancer Brother         Right upper lung removed     Gastrointestinal Disease Brother      Bladder Cancer Brother      Alzheimer Disease Brother         early onset     Depression Maternal Grandfather         Bi-Polar     Cancer - colorectal No family hx of      Thyroid Disease No family hx of      Anesthesia Reaction No family hx of      History   Drug Use No         Objective     /75 (BP Location: Right arm, Cuff Size: Adult Regular)   Pulse 64   Temp 98.7  F (37.1  C) (Tympanic)   Resp 12   Wt 61.2 kg (135 lb)   SpO2  98%   BMI 22.47 kg/m      Physical Exam    GENERAL APPEARANCE: healthy, alert and no distress     EYES: EOMI     RESP: lungs clear to auscultation - no rales, rhonchi or wheezes     CV: regular rates and rhythm, normal S1 S2, no S3 or S4 and no murmur, click or rub     MS: extremities normal- no gross deformities noted, no evidence of inflammation in joints, FROM in all extremities.     SKIN: no suspicious lesions or rashes     NEURO: Normal strength and tone, sensory exam grossly normal, mentation intact and speech normal     PSYCH: mentation appears normal. and affect normal/bright    Recent Labs   Lab Test 08/26/20  1802 12/11/19  1105 11/14/19  0902   HGB 12.5 13.5 12.8    219 232     --  136   POTASSIUM 3.3*  --  4.4   CR 0.77  --  0.66        Diagnostics:  Recent Results (from the past 48 hour(s))   Potassium    Collection Time: 08/26/21 11:32 AM   Result Value Ref Range    Potassium 4.3 3.4 - 5.3 mmol/L   Creatinine    Collection Time: 08/26/21 11:32 AM   Result Value Ref Range    Creatinine 0.67 0.52 - 1.04 mg/dL    GFR Estimate 87 >60 mL/min/1.73m2      No EKG required, no history of coronary heart disease, significant arrhythmia, peripheral arterial disease or other structural heart disease.    Revised Cardiac Risk Index (RCRI):  The patient has the following serious cardiovascular risks for perioperative complications:   - No serious cardiac risks = 0 points     RCRI Interpretation: 0 points: Class I (very low risk - 0.4% complication rate)           Signed Electronically by: JARED Ambrose CNP  Copy of this evaluation report is provided to requesting physician.

## 2021-08-26 NOTE — PATIENT INSTRUCTIONS
Preparing for Your Surgery  Getting started  A nurse will call you to review your health history and instructions. They will give you an arrival time based on your scheduled surgery time.  Please be ready to share the following:    Your doctor's clinic name and phone number    Your medical, surgical and anesthesia history    A list of allergies and sensitivities    A list of medicines, including herbal treatments and over-the-counter drugs    Whether the patient has a legal guardian (ask how to send us the papers in advance)  If you have a child who's having surgery, please ask for a copy of Preparing for Your Child's Surgery.    Preparing for surgery    Within 30 days of surgery: Have a pre-op exam (sometimes called an H&P, or History and Physical). This can be done at a clinic or pre-operative center.  ? If you're having a , you may not need this exam. Talk to your care team    At your pre-op exam, talk to your care team about all medicines you take. If you need to stop any medicines before surgery, ask when to start taking them again.  ? We do this for your safety. Many medicines can make you bleed too much during surgery. Some change how well surgery (anesthesia) drugs work.    Call your insurance company to let them know you're having surgery. (If you don't have insurance, call [phone].)    Call your clinic if there's any change in your health. This includes signs of a cold or flu (sore throat, runny nose, cough, rash, fever). It also includes a scrape or scratch near the surgery site.    If you have questions on the day of surgery, call your hospital or surgery center.  Eating and drinking guidelines  For your safety: Unless your surgeon tells you otherwise, follow the guidelines below.    Eat and drink as usual until 8 hours before surgery. After that, no food or milk.    Drink clear liquids until 2 hours before surgery. These are liquids you can see through, like water, Gatorade and Propel  Water. You may also have black coffee and tea (no cream or milk).    Nothing by mouth within 2 hours of surgery. This includes gum, candy and breath mints.    If you drink, stop drinking alcohol the night before surgery.    If your care team tells you to take medicine on the morning of surgery, it's okay to take it with a sip of water.  Preventing infection    Shower or bathe the night before and morning of your surgery. Follow the instructions your clinic gave you. (If no instructions, use regular soap.)    Don't shave or clip hair near your surgery site. We'll remove the hair if needed.    Don't smoke or vape the morning of surgery. You may chew nicotine gum up to 2 hours before surgery. A nicotine patch is okay.  ? Note: Some surgeries require you to completely quit smoking and nicotine. Check with your surgeon.    Your care team will make every effort to keep you safe from infection. We will:  ? Clean our hands often with soap and water (or an alcohol-based hand rub).  ? Clean the skin at your surgery site with a special soap that kills germs.  ? Give you a special gown to keep you warm. (Cold raises the risk of infection.)  ? Wear special hair covers, masks, gowns and gloves during surgery.  ? Give antibiotic medicine, if prescribed. Not all surgeries need antibiotics.  What to bring on the day of surgery    Photo ID and insurance card    Copy of your health care directive, if you have one    Glasses and hearing aides (bring cases)  ? You can't wear contacts during surgery    Inhaler and eye drops, if you use them (tell us about these when you arrive)    CPAP machine or breathing device, if you use them    A few personal items, if spending the night    If you have . . .  ? A pacemaker or ICD (cardiac defibrillator): Bring the ID card.  ? An implanted stimulator: Bring the remote control.  ? A legal guardian: Bring a copy of the certified (court-stamped) guardianship papers.  Please remove any jewelry, including  body piercings. Leave jewelry and other valuables at home.  If you're going home the day of surgery  Important: If you don't follow the rules below, we must cancel your surgery.     Arrange for someone to drive you home after surgery. You may not drive, take a taxi or take public transportation by yourself (unless you'll have local anesthesia only).    Arrange for a responsible adult to stay with you overnight. If you don't, we may keep you in the hospital overnight, and you may need to pay the costs yourself.  Questions?   If you have any questions for your care team, list them here: _________________________________________________________________________________________________________________________________________________________________________________________________________________________________________________________________________________________________________________________  For informational purposes only. Not to replace the advice of your health care provider. Copyright   2003, 2019 [employer]. All rights reserved. Clinically reviewed by Nadege Enriquez MD. [employer] 935848 - REV 4/20.    Before Your Procedure or Hospital Admission  Testing for COVID-19 (Coronavirus)  Thank you for choosing [facility] for your health care needs. The COVID-19 pandemic is a very challenging time for everyone.   Our goal is to keep you and our team here at [facility] safe and healthy. We've taken many steps to make this happen. For example:    We test and screen our staff, care teams and patients for COVID-19.    Everyone at [facility] must wear a mask and stay 6 feet apart.    We are limiting hospital and clinic visitors.  Before you come in  All patients must get tested for COVID-19. Your test needs to happen 3 to 4 days before you check in to the hospital or surgery site.   A clinic scheduler will call about a week in advance to set up a testing time at one of our labs. We'll take  "a swab of your nose or throat.  Note: If you go to a clinic or pharmacy like [facility] or [facility] for your test, make sure you get a test inside the nose. Tests inside the nose are:    A naso-pharyngeal (NP) RT-PCR test    An anterior nares RT-PCR test  Do NOT get a \"rapid\" test or a saliva (spit) test.  After the test, please stay at home and stay out of contact with other people. It will be harder for you to recover if you get COVID-19 before your treatment.  Please follow all current safety guidelines, including:    Limit trips outside your home.    Limit the number of people you see.    Always wear a mask outside your home.    Use social distancing. Stay 6 feet away from others whenever you can.    Wash your hands often.  If your test shows you have COVID-19  If your test is positive, we'll let you know. A positive test means that you have the virus.   We'll probably have to postpone your admission, surgery or procedure. Your doctor will discuss this with you. After that, we'll let you know what to do and when you can re-schedule.   We may need to cancel your treatment on short notice for other reasons, too.  If your test shows you DON'T have COVID-19  Even if your test is negative, you can still get COVID-19. It's rare but, sometimes, the test result is wrong. You could also catch the virus after taking the test.   There's a very small chance that you could catch COVID-19 in the hospital or surgery center. [facility] has taken many steps to prevent this from happening.   Day of your surgery or procedure    Please come wearing a face covering that covers both your nose and mouth.    When you arrive, we'll ask you some questions to find out if you have any signs or symptoms of COVID-19.    Ask your care team if you can have visitors. All visitors must wear face coverings and will be screened for signs of COVID-19.  ? Even if no visitors are allowed, you can still have with you:    Your legal guardian or legal " "decision maker    A parent and one other visitor, if you are younger than 18 years old    A partner and a , if you are in labor  ? We might need to teach you about taking care of yourself after surgery. If so, a visitor can come into the hospital to learn about it, too.  ? The rules for visitors change often, depending on how much the virus is spreading. To learn more, see Visiting a Loved One in the Hospital during the COVID-19 Outbreak.  Please call your care team, hospital or surgery center if you have any questions. We thank you for your understanding and for choosing [facility] for your care.   Questions and Answers  Does it matter where I get tested for COVID-19?  Yes. We urge you to get tested at one of our [facility] COVID-19 testing sites. We process these tests in our lab and can get the results quickly. Your [facility] care team needs to get your results before you check in.  What should I do if I can't get tested at [facility]?  You can get tested somewhere else, but you'll need to take these extra steps:   1. Contact your family doctor or clinic to arrange your test.  2. Take the test within 4 days of your surgery or procedure. We can't accept tests older than 4 days.  3. Make sure you're getting a test inside the nose. Tests inside the nose are:  ? A naso-pharyngeal (NP) RT-PCR test  ? An anterior nares RT-PCR test  Many pharmacies use \"rapid\" tests or saliva (spit) tests. We do NOT accept those tests before surgery or procedures. Tests from inside the nose are the most accurate tests.  1. Make sure your doctor or clinic faxes your results to [facility] at [phone].  If we don't get your results in time, we may have to delay or cancel your treatment.  For informational purposes only. Not to replace the advice of your health care provider. Copyright   2020 [facility]. All rights reserved. Clinically reviewed by Infection Prevention and the " [facility] COVID-19 Clinical Team. [place] [id number] - Rev 08/18/21.      No vitamins morning of surgery

## 2021-08-27 LAB
CREAT SERPL-MCNC: 0.67 MG/DL (ref 0.52–1.04)
GFR SERPL CREATININE-BSD FRML MDRD: 87 ML/MIN/1.73M2
POTASSIUM BLD-SCNC: 4.3 MMOL/L (ref 3.4–5.3)

## 2021-09-18 DIAGNOSIS — F41.9 ANXIETY: ICD-10-CM

## 2021-09-18 DIAGNOSIS — F32.1 MODERATE MAJOR DEPRESSION (H): ICD-10-CM

## 2021-09-20 NOTE — TELEPHONE ENCOUNTER
Routing refill request to provider for review/approval because:  PHQ is 5 or over  PHQ-9 score:    PHQ 8/26/2021   PHQ-9 Total Score 6   Q9: Thoughts of better off dead/self-harm past 2 weeks Not at all     Patient needs visit every 6 months for RN to fill.    Yulia Boyer RN, [facility]

## 2021-09-25 ENCOUNTER — HEALTH MAINTENANCE LETTER (OUTPATIENT)
Age: 76
End: 2021-09-25

## 2021-10-12 ENCOUNTER — TELEPHONE (OUTPATIENT)
Dept: FAMILY MEDICINE | Facility: CLINIC | Age: 76
End: 2021-10-12

## 2021-10-12 DIAGNOSIS — F41.9 ANXIETY: ICD-10-CM

## 2021-10-12 DIAGNOSIS — Z12.11 COLON CANCER SCREENING: Primary | ICD-10-CM

## 2021-10-12 DIAGNOSIS — F32.1 MODERATE MAJOR DEPRESSION (H): ICD-10-CM

## 2021-10-12 NOTE — TELEPHONE ENCOUNTER
Reason for Call:  Other prescription    Detailed comments: Patient is sending over a my chart today requesting a refill on her FLUoxetine (PROZAC) 20 MG capsule and be sent to [place] [id number] [place] [address] Patient is also requesting to get a colorectal home kit. Patient stated that she can stop by the clinic and pick the colorectal home kit up once it is ready. Thank you          Phone Number Patient can be reached at: Home number on file [phone] (home)    Best Time: anytime    Can we leave a detailed message on this number? YES    Call taken on 10/12/2021 at 7:45 AM by Yulia Lou

## 2021-10-12 NOTE — TELEPHONE ENCOUNTER
Order placed for FIT testing.  Please have this placed at  for pickup and notify patient when available for pickup.    PSK    PHQ 5/11/2020 12/18/2020 8/26/2021   PHQ-9 Total Score 7 2 6   Q9: Thoughts of better off dead/self-harm past 2 weeks Not at all Not at all Not at all     ALEN-7 SCORE 11/14/2019 5/11/2020 12/18/2020   Total Score - - -   Total Score 6 4 1

## 2021-10-12 NOTE — TELEPHONE ENCOUNTER
Provider, see patient message below about wanting to do a home colon cancer test.     Abbie GUYN, RN

## 2021-10-18 ENCOUNTER — LAB (OUTPATIENT)
Dept: LAB | Facility: CLINIC | Age: 76
End: 2021-10-18
Payer: COMMERCIAL

## 2021-10-18 DIAGNOSIS — Z12.11 COLON CANCER SCREENING: ICD-10-CM

## 2021-10-18 PROCEDURE — 82274 ASSAY TEST FOR BLOOD FECAL: CPT

## 2021-10-20 LAB — HEMOCCULT STL QL IA: NEGATIVE

## 2021-11-08 ENCOUNTER — MYC MEDICAL ADVICE (OUTPATIENT)
Dept: FAMILY MEDICINE | Facility: CLINIC | Age: 76
End: 2021-11-08
Payer: COMMERCIAL

## 2021-11-29 ENCOUNTER — MYC MEDICAL ADVICE (OUTPATIENT)
Dept: FAMILY MEDICINE | Facility: CLINIC | Age: 76
End: 2021-11-29
Payer: COMMERCIAL

## 2022-01-30 DIAGNOSIS — F41.9 ANXIETY: ICD-10-CM

## 2022-01-30 DIAGNOSIS — F32.1 MODERATE MAJOR DEPRESSION (H): ICD-10-CM

## 2022-01-31 NOTE — TELEPHONE ENCOUNTER
Routing refill request to provider for review/approval because:  Ade given x1 and patient did not follow up, please advise

## 2022-03-01 DIAGNOSIS — F32.1 MODERATE MAJOR DEPRESSION (H): ICD-10-CM

## 2022-03-01 DIAGNOSIS — F41.9 ANXIETY: ICD-10-CM

## 2022-03-03 NOTE — TELEPHONE ENCOUNTER
Routing refill request to provider for review/approval because:  Patient needs to be seen for further refills per MAR on 2/1/22. She has no appointment scheduled.     SSRIs Protocol Failed 03/01/2022 11:31 AM   Protocol Details  PHQ-9 score less than 5 in past 6 months    Recent (6 mo) or future (30 days) visit within the authorizing provider's specialty     Lola Alvarez RN, BSN   [facility]

## 2022-03-12 ENCOUNTER — HEALTH MAINTENANCE LETTER (OUTPATIENT)
Age: 77
End: 2022-03-12

## 2022-04-05 DIAGNOSIS — F41.9 ANXIETY: ICD-10-CM

## 2022-04-05 DIAGNOSIS — F32.1 MODERATE MAJOR DEPRESSION (H): ICD-10-CM

## 2022-04-05 NOTE — TELEPHONE ENCOUNTER
Patient calling stating that she has her annual physical with PCP on 6/30/22, but will need a refill of her Fluoxetine in 4 days.     Will route to provider to review - med pended below.     Routing refill request to provider for review/approval because:  PHQ-9 score    Appointments in Next Year    Jun 30, 2022  2:20 PM  (Arrive by 2:00 PM)  Annual Wellness Visit with Melonie Lucia MD  [facility] ([facility] ) [phone]        Requested Prescriptions   Pending Prescriptions Disp Refills     FLUoxetine (PROZAC) 20 MG capsule 30 capsule 0     Sig: Take 1 capsule (20 mg) by mouth daily +++ appointment needed for additional refills +++       There is no refill protocol information for this order        China Caldwell RN, BSN  [facility]

## 2022-04-05 NOTE — TELEPHONE ENCOUNTER
PHQ 5/11/2020 12/18/2020 8/26/2021   PHQ-9 Total Score 7 2 6   Q9: Thoughts of better off dead/self-harm past 2 weeks Not at all Not at all Not at all     ALEN-7 SCORE 11/14/2019 5/11/2020 12/18/2020   Total Score - - -   Total Score 6 4 1

## 2022-06-29 ASSESSMENT — ENCOUNTER SYMPTOMS
ABDOMINAL PAIN: 0
ARTHRALGIAS: 1
CONSTIPATION: 0
FREQUENCY: 0
DIZZINESS: 0
NERVOUS/ANXIOUS: 1
NAUSEA: 0
PALPITATIONS: 0
HEMATURIA: 0
EYE PAIN: 0
MYALGIAS: 0
COUGH: 0
HEARTBURN: 0
CHILLS: 0
HEMATOCHEZIA: 0
DIARRHEA: 0
SORE THROAT: 0
WEAKNESS: 0
SHORTNESS OF BREATH: 0
BREAST MASS: 0
HEADACHES: 0
DYSURIA: 0
FEVER: 0
PARESTHESIAS: 0
JOINT SWELLING: 1

## 2022-06-29 ASSESSMENT — PATIENT HEALTH QUESTIONNAIRE - PHQ9
10. IF YOU CHECKED OFF ANY PROBLEMS, HOW DIFFICULT HAVE THESE PROBLEMS MADE IT FOR YOU TO DO YOUR WORK, TAKE CARE OF THINGS AT HOME, OR GET ALONG WITH OTHER PEOPLE: SOMEWHAT DIFFICULT
SUM OF ALL RESPONSES TO PHQ QUESTIONS 1-9: 5
SUM OF ALL RESPONSES TO PHQ QUESTIONS 1-9: 5

## 2022-06-29 ASSESSMENT — ACTIVITIES OF DAILY LIVING (ADL): CURRENT_FUNCTION: NO ASSISTANCE NEEDED

## 2022-06-30 ENCOUNTER — OFFICE VISIT (OUTPATIENT)
Dept: FAMILY MEDICINE | Facility: CLINIC | Age: 77
End: 2022-06-30
Payer: COMMERCIAL

## 2022-06-30 VITALS
TEMPERATURE: 98.2 F | HEIGHT: 64 IN | WEIGHT: 135.9 LBS | DIASTOLIC BLOOD PRESSURE: 64 MMHG | RESPIRATION RATE: 14 BRPM | OXYGEN SATURATION: 97 % | SYSTOLIC BLOOD PRESSURE: 116 MMHG | HEART RATE: 61 BPM | BODY MASS INDEX: 23.2 KG/M2

## 2022-06-30 DIAGNOSIS — Z13.1 SCREENING FOR DIABETES MELLITUS: ICD-10-CM

## 2022-06-30 DIAGNOSIS — Z12.11 COLON CANCER SCREENING: ICD-10-CM

## 2022-06-30 DIAGNOSIS — F32.1 MODERATE MAJOR DEPRESSION (H): ICD-10-CM

## 2022-06-30 DIAGNOSIS — F41.9 ANXIETY: ICD-10-CM

## 2022-06-30 DIAGNOSIS — Z13.220 SCREENING, LIPID: ICD-10-CM

## 2022-06-30 DIAGNOSIS — Z13.0 SCREENING, ANEMIA, DEFICIENCY, IRON: ICD-10-CM

## 2022-06-30 DIAGNOSIS — Z00.00 ENCOUNTER FOR MEDICARE ANNUAL WELLNESS EXAM: Primary | ICD-10-CM

## 2022-06-30 LAB
ALBUMIN SERPL-MCNC: 3.7 G/DL (ref 3.4–5)
ALP SERPL-CCNC: 82 U/L (ref 40–150)
ALT SERPL W P-5'-P-CCNC: 21 U/L (ref 0–50)
ANION GAP SERPL CALCULATED.3IONS-SCNC: 5 MMOL/L (ref 3–14)
AST SERPL W P-5'-P-CCNC: 20 U/L (ref 0–45)
BILIRUB SERPL-MCNC: 0.3 MG/DL (ref 0.2–1.3)
BUN SERPL-MCNC: 10 MG/DL (ref 7–30)
CALCIUM SERPL-MCNC: 9.3 MG/DL (ref 8.5–10.1)
CHLORIDE BLD-SCNC: 103 MMOL/L (ref 94–109)
CHOLEST SERPL-MCNC: 224 MG/DL
CO2 SERPL-SCNC: 29 MMOL/L (ref 20–32)
CREAT SERPL-MCNC: 0.69 MG/DL (ref 0.52–1.04)
ERYTHROCYTE [DISTWIDTH] IN BLOOD BY AUTOMATED COUNT: 12.9 % (ref 10–15)
FASTING STATUS PATIENT QL REPORTED: NO
GFR SERPL CREATININE-BSD FRML MDRD: 90 ML/MIN/1.73M2
GLUCOSE BLD-MCNC: 95 MG/DL (ref 70–99)
HCT VFR BLD AUTO: 38 % (ref 35–47)
HDLC SERPL-MCNC: 57 MG/DL
HGB BLD-MCNC: 12.8 G/DL (ref 11.7–15.7)
LDLC SERPL CALC-MCNC: 145 MG/DL
MCH RBC QN AUTO: 30.5 PG (ref 26.5–33)
MCHC RBC AUTO-ENTMCNC: 33.7 G/DL (ref 31.5–36.5)
MCV RBC AUTO: 91 FL (ref 78–100)
NONHDLC SERPL-MCNC: 167 MG/DL
PLATELET # BLD AUTO: 241 10E3/UL (ref 150–450)
POTASSIUM BLD-SCNC: 4.7 MMOL/L (ref 3.4–5.3)
PROT SERPL-MCNC: 7.3 G/DL (ref 6.8–8.8)
RBC # BLD AUTO: 4.19 10E6/UL (ref 3.8–5.2)
SODIUM SERPL-SCNC: 137 MMOL/L (ref 133–144)
TRIGL SERPL-MCNC: 109 MG/DL
WBC # BLD AUTO: 7 10E3/UL (ref 4–11)

## 2022-06-30 PROCEDURE — 80061 LIPID PANEL: CPT | Performed by: FAMILY MEDICINE

## 2022-06-30 PROCEDURE — 85027 COMPLETE CBC AUTOMATED: CPT | Performed by: FAMILY MEDICINE

## 2022-06-30 PROCEDURE — 36415 COLL VENOUS BLD VENIPUNCTURE: CPT | Performed by: FAMILY MEDICINE

## 2022-06-30 PROCEDURE — 80053 COMPREHEN METABOLIC PANEL: CPT | Performed by: FAMILY MEDICINE

## 2022-06-30 PROCEDURE — G0439 PPPS, SUBSEQ VISIT: HCPCS | Performed by: FAMILY MEDICINE

## 2022-06-30 ASSESSMENT — ENCOUNTER SYMPTOMS
PARESTHESIAS: 0
FREQUENCY: 0
BREAST MASS: 0
PALPITATIONS: 0
HEADACHES: 0
WEAKNESS: 0
JOINT SWELLING: 1
NERVOUS/ANXIOUS: 1
SORE THROAT: 0
ARTHRALGIAS: 1
CONSTIPATION: 0
DIARRHEA: 0
MYALGIAS: 0
ABDOMINAL PAIN: 0
SHORTNESS OF BREATH: 0
FEVER: 0
HEARTBURN: 0
EYE PAIN: 0
DIZZINESS: 0
NAUSEA: 0
COUGH: 0
HEMATURIA: 0
DYSURIA: 0
HEMATOCHEZIA: 0
CHILLS: 0

## 2022-06-30 ASSESSMENT — PAIN SCALES - GENERAL: PAINLEVEL: NO PAIN (0)

## 2022-06-30 ASSESSMENT — ANXIETY QUESTIONNAIRES
2. NOT BEING ABLE TO STOP OR CONTROL WORRYING: NOT AT ALL
6. BECOMING EASILY ANNOYED OR IRRITABLE: NOT AT ALL
GAD7 TOTAL SCORE: 2
IF YOU CHECKED OFF ANY PROBLEMS ON THIS QUESTIONNAIRE, HOW DIFFICULT HAVE THESE PROBLEMS MADE IT FOR YOU TO DO YOUR WORK, TAKE CARE OF THINGS AT HOME, OR GET ALONG WITH OTHER PEOPLE: NOT DIFFICULT AT ALL
5. BEING SO RESTLESS THAT IT IS HARD TO SIT STILL: NOT AT ALL
1. FEELING NERVOUS, ANXIOUS, OR ON EDGE: SEVERAL DAYS
7. FEELING AFRAID AS IF SOMETHING AWFUL MIGHT HAPPEN: NOT AT ALL
GAD7 TOTAL SCORE: 2
3. WORRYING TOO MUCH ABOUT DIFFERENT THINGS: NOT AT ALL

## 2022-06-30 ASSESSMENT — ACTIVITIES OF DAILY LIVING (ADL): CURRENT_FUNCTION: NO ASSISTANCE NEEDED

## 2022-06-30 ASSESSMENT — PATIENT HEALTH QUESTIONNAIRE - PHQ9: 5. POOR APPETITE OR OVEREATING: SEVERAL DAYS

## 2022-06-30 NOTE — PROGRESS NOTES
"SUBJECTIVE:   Nicki Guerra is a 77 year old female who presents for Preventive Visit.      Patient has been advised of split billing requirements and indicates understanding: Yes  Are you in the first 12 months of your Medicare coverage?  No    Healthy Habits:     In general, how would you rate your overall health?  Good    Frequency of exercise:  4-5 days/week    Duration of exercise:  30-45 minutes    Do you usually eat at least 4 servings of fruit and vegetables a day, include whole grains    & fiber and avoid regularly eating high fat or \"junk\" foods?  Yes    Medication side effects:  Other    Ability to successfully perform activities of daily living:  No assistance needed    Home Safety:  No safety concerns identified    Hearing Impairment:  No hearing concerns    In the past 6 months, have you been bothered by leaking of urine?  No    In general, how would you rate your overall mental or emotional health?  Good      PHQ-2 Total Score: 2    Additional concerns today:  No    Walking for exercise - stretching - tolerated well.      Do you feel safe in your environment? Yes    Have you ever done Advance Care Planning? (For example, a Health Directive, POLST, or a discussion with a medical provider or your loved ones about your wishes): Yes, patient states has an Advance Care Planning document and will bring a copy to the clinic.       Fall risk  Fallen 2 or more times in the past year?: No  Any fall with injury in the past year?: No    Cognitive Screening   1) Repeat 3 items (Leader, Season, Table)    2) Clock draw: NORMAL  3) 3 item recall: Recalls 3 objects  Results: 3 items recalled: COGNITIVE IMPAIRMENT LESS LIKELY    Mini-CogTM Copyright AMADEO Ferreira. Licensed by the author for use in [facility]; reprinted with permission ([email or web address]). All rights reserved.      Do you have sleep apnea, excessive snoring or daytime drowsiness?: no    Reviewed and updated as needed this visit by clinical staff   " Tobacco  Allergies  Meds   Med Hx  Surg Hx  Fam Hx  Soc Hx          Reviewed and updated as needed this visit by Provider   Tobacco  Allergies  Meds   Med Hx  Surg Hx  Fam Hx  Soc Hx         Social History     Tobacco Use     Smoking status: Former Smoker     Packs/day: 2.00     Years: 17.00     Pack years: 34.00     Types: Cigarettes     Start date: 10/20/1960     Quit date: 1977     Years since quittin.1     Smokeless tobacco: Never Used     Tobacco comment: Too much, thank God that s done   Substance Use Topics     Alcohol use: Yes     Comment: Seldom, one glass wine with a meal now and then         Alcohol Use 2022   Prescreen: >3 drinks/day or >7 drinks/week? No   Prescreen: >3 drinks/day or >7 drinks/week? -           Depression and Anxiety Follow-Up    How are you doing with your depression since your last visit? No change    How are you doing with your anxiety since your last visit?  No change    Are you having other symptoms that might be associated with depression or anxiety? No    Have you had a significant life event? No     Do you have any concerns with your use of alcohol or other drugs? No    Social History     Tobacco Use     Smoking status: Former Smoker     Packs/day: 2.00     Years: 17.00     Pack years: 34.00     Types: Cigarettes     Start date: 10/20/1960     Quit date: 1977     Years since quittin.1     Smokeless tobacco: Never Used     Tobacco comment: Too much, thank God that s done   Vaping Use     Vaping Use: Never used   Substance Use Topics     Alcohol use: Yes     Comment: Seldom, one glass wine with a meal now and then     Drug use: Not Currently     Types: Marijuana, Amphetamines, LSD     Comment: Amph-Marij were often in 20 s, LSD one time     PHQ 2020   PHQ-9 Total Score 2 6 5   Q9: Thoughts of better off dead/self-harm past 2 weeks Not at all Not at all Not at all     ALEN-7 SCORE 2020   Total  Score - - -   Total Score 4 1 2         Suicide Assessment Five-step Evaluation and Treatment (SAFE-T)      Current providers sharing in care for this patient include:   Patient Care Team:  Melonie Lucia MD as PCP - Ezra Land MD as Assigned Heart and Vascular Provider  Melonie Lucia MD as Assigned PCP    The following health maintenance items are reviewed in Epic and correct as of today:  Health Maintenance Due   Topic Date Due     ANNUAL REVIEW OF HM ORDERS  Never done     COVID-19 Vaccine (1) Never done     BP Readings from Last 3 Encounters:   06/30/22 116/64   08/26/21 116/75   01/12/21 115/69    Wt Readings from Last 3 Encounters:   06/30/22 61.6 kg (135 lb 14.4 oz)   08/26/21 61.2 kg (135 lb)   01/12/21 59.4 kg (131 lb)                  Pneumonia Vaccine:  Series completed  Mammogram Screening: Mammogram Screening - Patient over age 75, has elected to continue with screening.      Pertinent mammograms are reviewed under the imaging tab.    Review of Systems   Constitutional: Negative for chills and fever.   HENT: Negative for congestion, ear pain, hearing loss and sore throat.    Eyes: Negative for pain and visual disturbance.   Respiratory: Negative for cough and shortness of breath.    Cardiovascular: Negative for chest pain, palpitations and peripheral edema.   Gastrointestinal: Negative for abdominal pain, constipation, diarrhea, heartburn, hematochezia and nausea.   Breasts:  Negative for tenderness, breast mass and discharge.   Genitourinary: Positive for genital sores. Negative for dysuria, frequency, hematuria, pelvic pain, urgency, vaginal bleeding and vaginal discharge.   Musculoskeletal: Positive for arthralgias and joint swelling. Negative for myalgias.   Skin: Negative for rash.   Neurological: Negative for dizziness, weakness, headaches and paresthesias.   Psychiatric/Behavioral: Positive for mood changes. The patient is nervous/anxious.    right hand - 3rd  "finger triggering -   Genital sores - had herpes since younger.  Symptoms of heat will trigger out break. Topical treatment resolves symptoms.   Chronic depression and anxiety - well controlled.     OBJECTIVE:   /64   Pulse 61   Temp 98.2  F (36.8  C) (Tympanic)   Resp 14   Ht 1.613 m (5' 3.5\")   Wt 61.6 kg (135 lb 14.4 oz)   SpO2 97%   BMI 23.70 kg/m   Estimated body mass index is 23.7 kg/m  as calculated from the following:    Height as of this encounter: 1.613 m (5' 3.5\").    Weight as of this encounter: 61.6 kg (135 lb 14.4 oz).     Physical Exam  GENERAL APPEARANCE: healthy, alert and no distress  EYES: Eyes grossly normal to inspection, PERRL and conjunctivae and sclerae normal  HENT: ear canals and TM's normal, nose and mouth without ulcers or lesions, oropharynx clear and oral mucous membranes moist  NECK: no adenopathy, no asymmetry, masses, or scars and thyroid normal to palpation  RESP: lungs clear to auscultation - no rales, rhonchi or wheezes  BREAST: normal without masses, tenderness or nipple discharge and no palpable axillary masses or adenopathy  CV: regular rate and rhythm, normal S1 S2, no S3 or S4, no murmur, click or rub, no peripheral edema and peripheral pulses strong  ABDOMEN: soft, nontender, no hepatosplenomegaly, no masses and bowel sounds normal  MS: no musculoskeletal defects are noted and gait is age appropriate without ataxia  SKIN: no suspicious lesions or rashes  NEURO: Normal strength and tone, sensory exam grossly normal, mentation intact and speech normal  PSYCH: mentation appears normal and affect normal/bright    Diagnostic Test Results:  Labs reviewed in Epic  Results for orders placed or performed in visit on 06/30/22   CBC with platelets     Status: Normal   Result Value Ref Range    WBC Count 7.0 4.0 - 11.0 10e3/uL    RBC Count 4.19 3.80 - 5.20 10e6/uL    Hemoglobin 12.8 11.7 - 15.7 g/dL    Hematocrit 38.0 35.0 - 47.0 %    MCV 91 78 - 100 fL    MCH 30.5 26.5 - " 33.0 pg    MCHC 33.7 31.5 - 36.5 g/dL    RDW 12.9 10.0 - 15.0 %    Platelet Count 241 150 - 450 10e3/uL   Comprehensive metabolic panel (BMP + Alb, Alk Phos, ALT, AST, Total. Bili, TP)     Status: Normal   Result Value Ref Range    Sodium 137 133 - 144 mmol/L    Potassium 4.7 3.4 - 5.3 mmol/L    Chloride 103 94 - 109 mmol/L    Carbon Dioxide (CO2) 29 20 - 32 mmol/L    Anion Gap 5 3 - 14 mmol/L    Urea Nitrogen 10 7 - 30 mg/dL    Creatinine 0.69 0.52 - 1.04 mg/dL    Calcium 9.3 8.5 - 10.1 mg/dL    Glucose 95 70 - 99 mg/dL    Alkaline Phosphatase 82 40 - 150 U/L    AST 20 0 - 45 U/L    ALT 21 0 - 50 U/L    Protein Total 7.3 6.8 - 8.8 g/dL    Albumin 3.7 3.4 - 5.0 g/dL    Bilirubin Total 0.3 0.2 - 1.3 mg/dL    GFR Estimate 90 >60 mL/min/1.73m2   Lipid panel reflex to direct LDL Fasting     Status: Abnormal   Result Value Ref Range    Cholesterol 224 (H) <200 mg/dL    Triglycerides 109 <150 mg/dL    Direct Measure HDL 57 >=50 mg/dL    LDL Cholesterol Calculated 145 (H) <=100 mg/dL    Non HDL Cholesterol 167 (H) <130 mg/dL    Patient Fasting > 8hrs? No     Narrative    Cholesterol  Desirable:  <200 mg/dL    Triglycerides  Normal:  Less than 150 mg/dL  Borderline High:  150-199 mg/dL  High:  200-499 mg/dL  Very High:  Greater than or equal to 500 mg/dL    Direct Measure HDL  Female:  Greater than or equal to 50 mg/dL   Male:  Greater than or equal to 40 mg/dL    LDL Cholesterol  Desirable:  <100mg/dL  Above Desirable:  100-129 mg/dL   Borderline High:  130-159 mg/dL   High:  160-189 mg/dL   Very High:  >= 190 mg/dL    Non HDL Cholesterol  Desirable:  130 mg/dL  Above Desirable:  130-159 mg/dL  Borderline High:  160-189 mg/dL  High:  190-219 mg/dL  Very High:  Greater than or equal to 220 mg/dL       ASSESSMENT / PLAN:   (Z00.00) Encounter for Medicare annual wellness exam  (primary encounter diagnosis)  Comment:   Plan: Comprehensive metabolic panel (BMP + Alb, Alk         Phos, ALT, AST, Total. Bili, TP)        Screening  and preventive care discussed    (F32.1) Moderate major depression (H)  (F41.9) Anxiety  Comment: Symptoms adequately controlled  Plan: FLUoxetine (PROZAC) 20 MG capsule,         Comprehensive metabolic panel (BMP + Alb, Alk         Phos, ALT, AST, Total. Bili, TP)        Continue current treatment    (Z13.220) Screening, lipid  Comment: Elevated cholesterol  Plan: Lipid panel reflex to direct LDL Fasting        The 10-year ASCVD risk score (Quynh CUMMINGS Jr., et al., 2013) is: 13.6%    Values used to calculate the score:      Age: 77 years      Sex: Female      Is Non- : No      Diabetic: No      Tobacco smoker: No      Systolic Blood Pressure: 116 mmHg      Is BP treated: No      HDL Cholesterol: 57 mg/dL      Total Cholesterol: 224 mg/dL  Consideration for cholesterol-lowering medication.  She has declined this in the past.    (Z13.1) Screening for diabetes mellitus  Comment: Fasting  Plan: Comprehensive metabolic panel (BMP + Alb, Alk         Phos, ALT, AST, Total. Bili, TP)        Normal blood sugar    (Z13.0) Screening, anemia, deficiency, iron  Comment: Screening  Plan: CBC with platelets        No sign of anemia    (Z12.11) Colon cancer screening  Comment: Due for colon cancer screening October.  Discussed various forms of screening.  She would prefer to continue with the FIT testing on a yearly basis.  Plan: Fecal colorectal cancer screen (FIT)        Kit given for collection in October.      Patient has been advised of split billing requirements and indicates understanding: Yes    COUNSELING:  Reviewed preventive health counseling, as reflected in patient instructions       Regular exercise       Healthy diet/nutrition       Vision screening       Dental care       Bladder control       Fall risk prevention       Osteoporosis prevention/bone health       Colon cancer screening    Estimated body mass index is 22.47 kg/m  as calculated from the following:    Height as of 1/12/21: 1.651 m  "(5' 5\").    Weight as of 8/26/21: 61.2 kg (135 lb).        She reports that she quit smoking about 45 years ago. Her smoking use included cigarettes. She started smoking about 61 years ago. She has a 20.00 pack-year smoking history. She has never used smokeless tobacco.      Appropriate preventive services were discussed with this patient, including applicable screening as appropriate for cardiovascular disease, diabetes, osteopenia/osteoporosis, and glaucoma.  As appropriate for age/gender, discussed screening for colorectal cancer, prostate cancer, breast cancer, and cervical cancer. Checklist reviewing preventive services available has been given to the patient.    Reviewed patients plan of care and provided an AVS. The Basic Care Plan (routine screening as documented in Health Maintenance) for Nicki meets the Care Plan requirement. This Care Plan has been established and reviewed with the Patient.    Counseling Resources:  ATP IV Guidelines  Pooled Cohorts Equation Calculator  Breast Cancer Risk Calculator  Breast Cancer: Medication to Reduce Risk  FRAX Risk Assessment  ICSI Preventive Guidelines  Dietary Guidelines for Americans, 2010  eZWay's MyPlate  ASA Prophylaxis  Lung CA Screening    Melonie Lucia MD  [facility]    Identified Health Risks:  Answers for HPI/ROS submitted by the patient on 6/29/2022  If you checked off any problems, how difficult have these problems made it for you to do your work, take care of things at home, or get along with other people?: Somewhat difficult  PHQ9 TOTAL SCORE: 5        The patient's PHQ-9 score is consistent with mild depression. She was provided with information regarding depression and was advised to schedule a follow up appointment in 52 weeks to further address this issue or sooner if worsening symptoms.                Patient Instructions     Labs today.    Refill fluoxetine sent to the pharmacy.      Mammogram recommended    You can " call [phone] to schedule this at the [facility] building in [place].    You are due for the colon cancer screening testing after October 18, 2022.            This chart was documented by provider using a voice activated software called Dragon in addition to manual typing. There may be vocabulary errors or other grammatical errors due to this.

## 2022-06-30 NOTE — PATIENT INSTRUCTIONS
"Labs today.    Refill fluoxetine sent to the pharmacy.      Mammogram recommended    You can call [phone] to schedule this at the [facility] in [place].    You are due for the colon cancer screening testing after October 18, 2022.  Patient Education   Personalized Prevention Plan  You are due for the preventive services outlined below.  Your care team is available to assist you in scheduling these services.  If you have already completed any of these items, please share that information with your care team to update in your medical record.  Health Maintenance Due   Topic Date Due    ANNUAL REVIEW OF HM ORDERS  Never done    COVID-19 Vaccine (1) Never done       Depression and Suicide in Older Adults    Nearly 2 million older Americans have some type of depression. Some of them even take their own lives. Yet depression among older adults is often ignored. Learn the warning signs. You may help spare a loved one needless pain. You may also save a life.   What is depression?  Depression is a common and serious illness that affects the way you think and feel. It is not a normal part of aging, nor is it a sign of weakness, a character flaw, or something you can snap out of. Most people with depression need treatment to get better. The most common symptom is a feeling of deep sadness. People who are depressed also may seem tired and listless. And nothing seems to give them pleasure. It s normal to grieve or be sad sometimes. But sadness lessens or passes with time. Depression rarely goes away or improves on its own. A person with clinical depression can't \"snap out of it.\" Other symptoms of depression are:   Sleeping more or less than normal  Eating more or less than normal  Having headaches, stomachaches, or other pains that don t go away  Feeling nervous,  empty,  or worthless  Crying a great deal  Thinking or talking about suicide or death  Loss of interest in activities previously enjoyed  Social " isolation  Feeling confused or forgetful  What causes it?  The causes of depression aren t fully known. But it is thought to result from a complex blend of these factors:   Biochemistry. Certain chemicals in the brain play a role.  Genes. Depression does run in families.  Life stress. Life stresses can also trigger depression in some people. Older adults often face many stressors, such as death of friends or a spouse, health problems, and financial concerns.  Chronic conditions. This includes conditions such as diabetes, heart disease, or cancer. These can cause symptoms of depression. Medicine side effects can cause changes in thoughts and behaviors.  How you can help  Often, depressed people may not want to ask for help. When they do, they may be ignored. Or, they may receive the wrong treatment. You can help by showing parents and older friends love and support. If they seem depressed, don t lecture the person, ignore the symptoms, or discount the symptoms as a  normal  part of aging -which they are not. Get involved, listen, and show interest and support.   Help them understand that depression is a treatable illness. Tell them you can help them find the right treatment. Offer to go to their healthcare provider's appointment with them for support when the symptoms are discussed. With their approval, contact a local mental health center, social service agency, or hospital about services.   You can be an advocate for him or her at healthcare appointments. Many older adults have chronic illnesses that can cause symptoms of depression. Medicine side effects can change thoughts and behaviors. You can help make sure that the healthcare provider looks at all of these factors. He or she should refer your family member or friend to a mental healthcare provider when needed. in some cases, untreated depression can lead to a misdiagnosis. A person may be diagnosed with a brain disorder such as dementia. If the healthcare  provider does not take the issue of depression seriously, help your family member or friend to find another provider.   Don't be afraid to ask  If you think an older person you care about could be suicidal, ask,  Have you thought about suicide?  Most people will tell you the truth. If they say  yes,  they may already have a plan for how and when they will attempt it. Find out as much as you can. The more detailed the plan, and the easier it is to carry out, the more danger the person is in right now. Tell the person you are there for them and do not want them to harm him or herself. Don't wait to get help for the person. Call the person's healthcare provider, local hospital, or emergency services.   To learn more  National Suicide Prevention Lifeline (crisis hotline) 195-255-ZLAQ (094-716-0164)  National [place] of Mental Btrtsa210-654-0102zrx.[facility].nih.gov  National [place] on Mental Addhdkb204-160-1736epm.[place].org  Mental Health Sffqwoq572-542-5968yzq.[facility].org  National Suicide Beovqgm235-KBVHTEC (020-731-2885)    Call 911  Never leave the person alone. A person who is actively suicidal needs psychiatric care right away. They will need constant supervision. Never leave the person out of sight. Call 911 or the national 24-hour suicide crisis hotline at 364-610-MXCF (997-343-0543). You can also take the person to the closest emergency room.   [employer] last reviewed this educational content on 5/1/2020 2000-2021 [employer]. All rights reserved. This information is not intended as a substitute for professional medical care. Always follow your healthcare professional's instructions.

## 2022-10-05 PROCEDURE — 82274 ASSAY TEST FOR BLOOD FECAL: CPT | Performed by: FAMILY MEDICINE

## 2022-10-07 LAB — HEMOCCULT STL QL IA: NEGATIVE

## 2022-12-14 ENCOUNTER — ANCILLARY PROCEDURE (OUTPATIENT)
Dept: MAMMOGRAPHY | Facility: CLINIC | Age: 77
End: 2022-12-14
Attending: FAMILY MEDICINE
Payer: COMMERCIAL

## 2022-12-14 DIAGNOSIS — Z12.31 VISIT FOR SCREENING MAMMOGRAM: ICD-10-CM

## 2022-12-14 PROCEDURE — 77063 BREAST TOMOSYNTHESIS BI: CPT | Mod: GC | Performed by: RADIOLOGY

## 2022-12-14 PROCEDURE — 77067 SCR MAMMO BI INCL CAD: CPT | Mod: GC | Performed by: RADIOLOGY

## 2023-01-07 ENCOUNTER — HEALTH MAINTENANCE LETTER (OUTPATIENT)
Age: 78
End: 2023-01-07

## 2023-05-31 ENCOUNTER — PATIENT OUTREACH (OUTPATIENT)
Dept: CARE COORDINATION | Facility: CLINIC | Age: 78
End: 2023-05-31

## 2023-06-26 DIAGNOSIS — F32.1 MODERATE MAJOR DEPRESSION (H): ICD-10-CM

## 2023-06-26 DIAGNOSIS — F41.9 ANXIETY: ICD-10-CM

## 2023-07-18 ENCOUNTER — TELEPHONE (OUTPATIENT)
Dept: FAMILY MEDICINE | Facility: CLINIC | Age: 78
End: 2023-07-18

## 2023-07-18 NOTE — LETTER
[address] [id number] [phone]  Dept: [phone]    July 18, 2023    Nicki Guerra  [address] [place] [address]  [facility] [address]    Dear Nicki,    At [facility] we care about your health and are committed to providing quality patient care.     Here is a list of Health Maintenance topics that are due now or due soon:  Health Maintenance Due   Topic Date Due    COVID-19 Vaccine (1) Never done    LUNG CANCER SCREENING  Never done    PHQ-9  12/30/2022    ANNUAL REVIEW OF HM ORDERS  06/30/2023    FALL RISK ASSESSMENT  06/30/2023    MEDICARE ANNUAL WELLNESS VISIT  06/30/2023        We are recommending that you:  Schedule a WELLNESS (Preventative/Physical) APPOINTMENT with your primary care provider. If you go elsewhere for your wellness appointments then please disregard this reminder     and   Schedule a Nurse-Only appointment to update your immunizations: Your records indicate that you are not up to date with your immunizations, please schedule a nurse-only appointment to get these updated or update them at your next office visit. If this is incorrect, please disregard.    To schedule an appointment or discuss this further, you may contact us by phone at the [facility] at [phone] or online through the patient [email or web address] @ [email or web address]    Thank you for trusting [facility] and we appreciate the opportunity to serve you.  We look forward to supporting your healthcare needs in the future.    Your partners in health,      Quality Committee at [facility]

## 2023-07-18 NOTE — TELEPHONE ENCOUNTER
Patient Quality Outreach    Patient is due for the following:   Physical Annual Wellness Visit      Topic Date Due     COVID-19 Vaccine (1) Never done       Next Steps:   Schedule annual wellness visit     Type of outreach:    Sent letter.      Questions for provider review:    None           Vandana Pryor MA

## 2023-08-21 DIAGNOSIS — F32.1 MODERATE MAJOR DEPRESSION (H): ICD-10-CM

## 2023-08-21 DIAGNOSIS — F41.9 ANXIETY: ICD-10-CM

## 2023-08-31 ENCOUNTER — OFFICE VISIT (OUTPATIENT)
Dept: FAMILY MEDICINE | Facility: CLINIC | Age: 78
End: 2023-08-31

## 2023-08-31 VITALS
TEMPERATURE: 98 F | RESPIRATION RATE: 13 BRPM | HEIGHT: 64 IN | SYSTOLIC BLOOD PRESSURE: 138 MMHG | DIASTOLIC BLOOD PRESSURE: 74 MMHG | HEART RATE: 51 BPM | WEIGHT: 135.8 LBS | OXYGEN SATURATION: 99 % | BODY MASS INDEX: 23.18 KG/M2

## 2023-08-31 DIAGNOSIS — Z13.1 SCREENING FOR DIABETES MELLITUS: ICD-10-CM

## 2023-08-31 DIAGNOSIS — F32.1 MODERATE MAJOR DEPRESSION (H): ICD-10-CM

## 2023-08-31 DIAGNOSIS — Z13.220 SCREENING, LIPID: ICD-10-CM

## 2023-08-31 DIAGNOSIS — Z00.00 ENCOUNTER FOR MEDICARE ANNUAL WELLNESS EXAM: Primary | ICD-10-CM

## 2023-08-31 DIAGNOSIS — Z13.0 SCREENING, ANEMIA, DEFICIENCY, IRON: ICD-10-CM

## 2023-08-31 DIAGNOSIS — F41.9 ANXIETY: ICD-10-CM

## 2023-08-31 DIAGNOSIS — M20.42 HAMMER TOE OF LEFT FOOT: ICD-10-CM

## 2023-08-31 LAB
ALBUMIN SERPL BCG-MCNC: 4.2 G/DL (ref 3.5–5.2)
ALP SERPL-CCNC: 86 U/L (ref 35–104)
ALT SERPL W P-5'-P-CCNC: 12 U/L (ref 0–50)
ANION GAP SERPL CALCULATED.3IONS-SCNC: 11 MMOL/L (ref 7–15)
AST SERPL W P-5'-P-CCNC: 26 U/L (ref 0–45)
BILIRUB SERPL-MCNC: 0.3 MG/DL
BUN SERPL-MCNC: 8.1 MG/DL (ref 8–23)
CALCIUM SERPL-MCNC: 9 MG/DL (ref 8.8–10.2)
CHLORIDE SERPL-SCNC: 98 MMOL/L (ref 98–107)
CHOLEST SERPL-MCNC: 218 MG/DL
CREAT SERPL-MCNC: 0.7 MG/DL (ref 0.51–0.95)
DEPRECATED HCO3 PLAS-SCNC: 25 MMOL/L (ref 22–29)
ERYTHROCYTE [DISTWIDTH] IN BLOOD BY AUTOMATED COUNT: 13.1 % (ref 10–15)
GFR SERPL CREATININE-BSD FRML MDRD: 89 ML/MIN/1.73M2
GLUCOSE SERPL-MCNC: 75 MG/DL (ref 70–99)
HCT VFR BLD AUTO: 42 % (ref 35–47)
HDLC SERPL-MCNC: 61 MG/DL
HGB BLD-MCNC: 13.9 G/DL (ref 11.7–15.7)
LDLC SERPL CALC-MCNC: 143 MG/DL
MCH RBC QN AUTO: 30.4 PG (ref 26.5–33)
MCHC RBC AUTO-ENTMCNC: 33.1 G/DL (ref 31.5–36.5)
MCV RBC AUTO: 92 FL (ref 78–100)
NONHDLC SERPL-MCNC: 157 MG/DL
PLATELET # BLD AUTO: 237 10E3/UL (ref 150–450)
POTASSIUM SERPL-SCNC: 4.1 MMOL/L (ref 3.4–5.3)
PROT SERPL-MCNC: 7.2 G/DL (ref 6.4–8.3)
RBC # BLD AUTO: 4.57 10E6/UL (ref 3.8–5.2)
SODIUM SERPL-SCNC: 134 MMOL/L (ref 136–145)
TRIGL SERPL-MCNC: 71 MG/DL
WBC # BLD AUTO: 6.7 10E3/UL (ref 4–11)

## 2023-08-31 PROCEDURE — 99214 OFFICE O/P EST MOD 30 MIN: CPT | Mod: 25 | Performed by: FAMILY MEDICINE

## 2023-08-31 PROCEDURE — 80061 LIPID PANEL: CPT | Performed by: FAMILY MEDICINE

## 2023-08-31 PROCEDURE — G0439 PPPS, SUBSEQ VISIT: HCPCS | Performed by: FAMILY MEDICINE

## 2023-08-31 PROCEDURE — 85027 COMPLETE CBC AUTOMATED: CPT | Performed by: FAMILY MEDICINE

## 2023-08-31 PROCEDURE — 36415 COLL VENOUS BLD VENIPUNCTURE: CPT | Performed by: FAMILY MEDICINE

## 2023-08-31 PROCEDURE — 80053 COMPREHEN METABOLIC PANEL: CPT | Performed by: FAMILY MEDICINE

## 2023-08-31 RX ORDER — FLUOXETINE 10 MG/1
10 CAPSULE ORAL DAILY
Qty: 90 CAPSULE | Refills: 1 | Status: SHIPPED | OUTPATIENT
Start: 2023-08-31 | End: 2023-12-13

## 2023-08-31 ASSESSMENT — ENCOUNTER SYMPTOMS
NERVOUS/ANXIOUS: 0
ARTHRALGIAS: 0
FEVER: 0
NAUSEA: 0
PALPITATIONS: 0
ABDOMINAL PAIN: 0
CHILLS: 0
COUGH: 0
MYALGIAS: 0
WEAKNESS: 0
DIARRHEA: 0
JOINT SWELLING: 0
DIZZINESS: 0
HEADACHES: 0
EYE PAIN: 0
SORE THROAT: 0
PARESTHESIAS: 0
FREQUENCY: 0
HEMATOCHEZIA: 0
DYSURIA: 0
HEMATURIA: 0
SHORTNESS OF BREATH: 0
CONSTIPATION: 0
HEARTBURN: 0
BREAST MASS: 0

## 2023-08-31 ASSESSMENT — ANXIETY QUESTIONNAIRES
3. WORRYING TOO MUCH ABOUT DIFFERENT THINGS: SEVERAL DAYS
GAD7 TOTAL SCORE: 2
5. BEING SO RESTLESS THAT IT IS HARD TO SIT STILL: NOT AT ALL
7. FEELING AFRAID AS IF SOMETHING AWFUL MIGHT HAPPEN: NOT AT ALL
6. BECOMING EASILY ANNOYED OR IRRITABLE: NOT AT ALL
1. FEELING NERVOUS, ANXIOUS, OR ON EDGE: NOT AT ALL
GAD7 TOTAL SCORE: 2
IF YOU CHECKED OFF ANY PROBLEMS ON THIS QUESTIONNAIRE, HOW DIFFICULT HAVE THESE PROBLEMS MADE IT FOR YOU TO DO YOUR WORK, TAKE CARE OF THINGS AT HOME, OR GET ALONG WITH OTHER PEOPLE: NOT DIFFICULT AT ALL
2. NOT BEING ABLE TO STOP OR CONTROL WORRYING: SEVERAL DAYS

## 2023-08-31 ASSESSMENT — PATIENT HEALTH QUESTIONNAIRE - PHQ9
5. POOR APPETITE OR OVEREATING: NOT AT ALL
SUM OF ALL RESPONSES TO PHQ QUESTIONS 1-9: 5
SUM OF ALL RESPONSES TO PHQ QUESTIONS 1-9: 5
10. IF YOU CHECKED OFF ANY PROBLEMS, HOW DIFFICULT HAVE THESE PROBLEMS MADE IT FOR YOU TO DO YOUR WORK, TAKE CARE OF THINGS AT HOME, OR GET ALONG WITH OTHER PEOPLE: SOMEWHAT DIFFICULT

## 2023-08-31 ASSESSMENT — ACTIVITIES OF DAILY LIVING (ADL): CURRENT_FUNCTION: NO ASSISTANCE NEEDED

## 2023-08-31 ASSESSMENT — PAIN SCALES - GENERAL: PAINLEVEL: NO PAIN (0)

## 2023-08-31 NOTE — PATIENT INSTRUCTIONS
Labs today.    Decrease fluoxetine to 10 mg daily.  If you continue to have mood symptoms under good control, you can consider discontinuing medication in Spring 2024      Mammogram after December 12.    For the [facility] area - use cushioning brace.  If over the counter ones not effective, notify me and I will place referral to podiatry.      Patient Education   Personalized Prevention Plan  You are due for the preventive services outlined below.  Your care team is available to assist you in scheduling these services.  If you have already completed any of these items, please share that information with your care team to update in your medical record.  Health Maintenance Due   Topic Date Due    COVID-19 Vaccine (1) Never done    Annual Wellness Visit  06/30/2023    ANNUAL REVIEW OF HM ORDERS  06/30/2023     Learning About Depression Screening  What is depression screening?  Depression screening is a way to see if you have depression symptoms. It may be done by a doctor or counselor. It's often part of a routine checkup. That's because your mental health is just as important as your physical health.  Depression is a mental health condition that affects how you feel, think, and act. You may:  Have less energy.  Lose interest in your daily activities.  Feel sad and grouchy for a long time.  Depression is very common. It affects people of all ages.  Many things can lead to depression. Some people become depressed after they have a stroke or find out they have a major illness like cancer or heart disease. The death of a loved one or a breakup may lead to depression. It can run in families. Most experts believe that a combination of inherited genes and stressful life events can cause it.  What happens during screening?  You may be asked to fill out a form about your depression symptoms. You and the doctor will discuss your answers. The doctor may ask you more questions to learn more about how you think, act, and  "feel.  What happens after screening?  If you have symptoms of depression, your doctor will talk to you about your options.  Doctors usually treat depression with medicines or counseling. Often, combining the two works best. Many people don't get help because they think that they'll get over the depression on their own. But people with depression may not get better unless they get treatment.  The cause of depression is not well understood. There may be many factors involved. But if you have depression, it's not your fault.  A serious symptom of depression is thinking about death or suicide. If you or someone you care about talks about this or about feeling hopeless, get help right away.  It's important to know that depression can be treated. Medicine, counseling, and self-care may help.  Where can you learn more?  Go to https://www.[employer].net/patiented  Enter T185 in the search box to learn more about \"Learning About Depression Screening.\"  Current as of: October 20, 2022               Content Version: 13.7    6401-1443 [employer].   Care instructions adapted under license by your healthcare professional. If you have questions about a medical condition or this instruction, always ask your healthcare professional. [employer] disclaims any warranty or liability for your use of this information.      Preventing Falls: Care Instructions    Talk to your doctor about the medicines you take. Ask if any of them increase the risk of falls and whether they can be changed or stopped.   Try to exercise regularly. It can help improve your strength and balance. This can help lower your risk of falling.     Practice fall safety and prevention.    Wear low-heeled shoes that fit well and give your feet good support. Talk to your doctor if you have foot problems that make this hard.  Carry a cellphone or wear a medical alert device that you can use to call for help.  Use stepladders instead of chairs to " "reach high objects. Don't climb if you're at risk for falls. Ask for help, if needed.  Wear the correct eyeglasses, if you need them.    Make your home safer.    Remove rugs, cords, clutter, and furniture from walkways.  Keep your house well lit. Use night-lights in hallways and bathrooms.  Install and use sturdy handrails on stairways.  Wear nonskid footwear, even inside. Don't walk barefoot or in socks without shoes.    Be safe outside.    Use handrails, curb cuts, and ramps whenever possible.  Keep your hands free by using a shoulder bag or backpack.  Try to walk in well-lit areas. Watch out for uneven ground, changes in pavement, and debris.  Be careful in the winter. Walk on the grass or gravel when sidewalks are slippery. Use de-icer on steps and walkways. Add non-slip devices to shoes.    Put grab bars and nonskid mats in your shower or tub and near the toilet. Try to use a shower chair or bath bench when bathing.   Get into a tub or shower by putting in your weaker leg first. Get out with your strong side first. Have a phone or medical alert device in the bathroom with you.   Where can you learn more?  Go to https://www.[employer].net/patiented  Enter G117 in the search box to learn more about \"Preventing Falls: Care Instructions.\"  Current as of: November 9, 2022               Content Version: 13.7    3869-5726 [employer].   Care instructions adapted under license by your healthcare professional. If you have questions about a medical condition or this instruction, always ask your healthcare professional. [employer] disclaims any warranty or liability for your use of this information.      How to Get Up Safely After a Fall: Care Instructions  Overview     If you have injuries, health problems, or other reasons that may make it easy for you to fall at home, it is a good idea to learn how to get up safely after a fall. Learning how to get up correctly can help you avoid making an " injury worse.  Also, knowing what to do if you cannot get up can help you stay safe until help arrives.  Follow-up care is a key part of your treatment and safety. Be sure to make and go to all appointments, and call your doctor if you are having problems. It's also a good idea to know your test results and keep a list of the medicines you take.  How can you care for yourself after a fall?  If you think you can get up  First lie still for a few minutes and think about how you feel. If your body feels okay and you think you can get up safely, follow the rest of the steps below:  Look for a chair or other piece of furniture that is close to you.  Roll onto your side and rest. Roll by turning your head in the direction you want to roll, move your shoulder and arm, then hip and leg in the same direction.  Lie still for a moment to let your blood pressure adjust.  Slowly push your upper body up, lift your head, and take a moment to rest.  Slowly get up on your hands and knees, and crawl to the chair or other stable piece of furniture.  Put your hands on the chair.  Move one foot forward, and place it flat on the floor. Your other leg should be bent with the knee on the floor.  Rise slowly, turn your body, and sit in the chair. Stay seated for a bit and think about how you feel. Call for help. Even if you feel okay, let someone know what happened to you. You might not know that you have a serious injury.  If you cannot get up  If you think you are injured after a fall or you cannot get up, try not to panic.  Call out for help.  If you have a phone within reach or you have an emergency call device, use it to call for help.  If you do not have a phone within reach, try to slide yourself toward it. If you cannot get to the phone, try to slide toward a door or window or a place where you think you can be heard.  Bath or use an object to make noise so someone might hear you.  If you can reach something that you can use for a  "pillow, place it under your head. Try to stay warm by covering yourself with a blanket or clothing while you wait for help.  When should you call for help?   Call 911 anytime you think you may need emergency care. For example, call if:    You passed out (lost consciousness).     You cannot get up after a fall.     You have severe pain.   Call your doctor now or seek immediate medical care if:    You have new or worse pain.     You are dizzy or lightheaded.     You hit your head.   Watch closely for changes in your health, and be sure to contact your doctor if:    You do not get better as expected.   Where can you learn more?  Go to https://www.[employer].net/patiented  Enter G513 in the search box to learn more about \"How to Get Up Safely After a Fall: Care Instructions.\"  Current as of: November 14, 2022               Content Version: 13.7    8893-9559 [employer].   Care instructions adapted under license by your healthcare professional. If you have questions about a medical condition or this instruction, always ask your healthcare professional. [employer] disclaims any warranty or liability for your use of this information.  "

## 2023-08-31 NOTE — PROGRESS NOTES
"SUBJECTIVE:   Nicki is a 78 year old who presents for Preventive Visit.      8/31/2023     9:04 AM   Additional Questions   Roomed by Vandana TOURE   Accompanied by self       Are you in the first 12 months of your Medicare coverage?  No    Healthy Habits:     In general, how would you rate your overall health?  Excellent    Frequency of exercise:  4-5 days/week    Duration of exercise:  30-45 minutes    Do you usually eat at least 4 servings of fruit and vegetables a day, include whole grains    & fiber and avoid regularly eating high fat or \"junk\" foods?  Yes    Taking medications regularly:  Yes    Medication side effects:  None    Ability to successfully perform activities of daily living:  No assistance needed    Home Safety:  No safety concerns identified    Hearing Impairment:  No hearing concerns    In the past 6 months, have you been bothered by leaking of urine?  No    In general, how would you rate your overall mental or emotional health?  Good    Walking, stretching - tolerated well.     Have you ever done Advance Care Planning? (For example, a Health Directive, POLST, or a discussion with a medical provider or your loved ones about your wishes): Yes, advance care planning is on file.    Has hearing aids.   Fall risk  Fallen 2 or more times in the past year?: Yes  Any fall with injury in the past year?: No  Walking dog - icy. Pulled by dog.  No injury.      Cognitive Screening   1) Repeat 3 items (Leader, Season, Table)    2) Clock draw: NORMAL  3) 3 item recall: Recalls 3 objects  Results: 3 items recalled: COGNITIVE IMPAIRMENT LESS LIKELY    Mini-CogTM Copyright S Hanna. Licensed by the author for use in [facility]; reprinted with permission (lorena@.[email or web address]). All rights reserved.      Do you have sleep apnea, excessive snoring or daytime drowsiness? : no    Reviewed and updated as needed this visit by clinical staff   Tobacco  Allergies  Meds   Med Hx  Surg Hx  Fam Hx          Reviewed " and updated as needed this visit by Provider   Tobacco  Allergies  Meds   Med Hx  Surg Hx  Fam Hx         Social History     Tobacco Use    Smoking status: Former     Packs/day: 2.00     Years: 17.00     Pack years: 34.00     Types: Cigarettes     Start date: 10/20/1960     Quit date: 1977     Years since quittin.3    Smokeless tobacco: Never    Tobacco comments:     Too much, thank God that's done   Substance Use Topics    Alcohol use: Yes     Comment: Seldom, one glass wine with a meal now and then             2023     7:27 AM   Alcohol Use   Prescreen: >3 drinks/day or >7 drinks/week? No     Do you have a current opioid prescription? No  Do you use any other controlled substances or medications that are not prescribed by a provider? None          Depression and Anxiety Follow-Up  How are you doing with your depression since your last visit? No change  How are you doing with your anxiety since your last visit?  No change  Are you having other symptoms that might be associated with depression or anxiety? No  Have you had a significant life event? No   Do you have any concerns with your use of alcohol or other drugs? No  Would like to wean medication.  Has felt good over last 2 years.    Remote history suicide attempt (age 18).  No current thoughts of harm or impulses.  Social History     Tobacco Use    Smoking status: Former     Packs/day: 2.00     Years: 17.00     Pack years: 34.00     Types: Cigarettes     Start date: 10/20/1960     Quit date: 1977     Years since quittin.3    Smokeless tobacco: Never    Tobacco comments:     Too much, thank God that's done   Vaping Use    Vaping Use: Never used   Substance Use Topics    Alcohol use: Yes     Comment: Seldom, one glass wine with a meal now and then    Drug use: Not Currently     Types: Marijuana, Amphetamines, LSD     Comment: Amph-Marij were often in 20 s, LSD one time         2021    11:07 AM 2022     4:10 PM 2023      7:19 AM   PHQ   PHQ-9 Total Score 6 5 5   Q9: Thoughts of better off dead/self-harm past 2 weeks Not at all Not at all Not at all         12/18/2020     9:53 AM 6/30/2022     3:27 PM 8/31/2023     7:19 AM   ALEN-7 SCORE   Total Score 1 2 2         Suicide Assessment Five-step Evaluation and Treatment (SAFE-T)      Current providers sharing in care for this patient include:   Patient Care Team:  Melonie Lucia MD as PCP - General  Melonie Lucia MD as Assigned PCP    The following health maintenance items are reviewed in Epic and correct as of today:  Health Maintenance   Topic Date Due    COVID-19 Vaccine (1) Never done    MEDICARE ANNUAL WELLNESS VISIT  06/30/2023    ANNUAL REVIEW OF HM ORDERS  06/30/2023    INFLUENZA VACCINE (1) 09/01/2023    PHQ-9  02/29/2024    FALL RISK ASSESSMENT  08/31/2024    DEXA  12/04/2024    LIPID  06/30/2027    ADVANCE CARE PLANNING  08/31/2028    DTAP/TDAP/TD IMMUNIZATION (4 - Td or Tdap) 05/11/2030    HEPATITIS C SCREENING  Completed    DEPRESSION ACTION PLAN  Completed    Pneumococcal Vaccine: 65+ Years  Completed    ZOSTER IMMUNIZATION  Completed    IPV IMMUNIZATION  Aged Out    HPV IMMUNIZATION  Aged Out    MENINGITIS IMMUNIZATION  Aged Out    MAMMO SCREENING  Discontinued    COLORECTAL CANCER SCREENING  Discontinued     BP Readings from Last 3 Encounters:   08/31/23 (!) 151/83   06/30/22 116/64   08/26/21 116/75    Wt Readings from Last 3 Encounters:   08/31/23 61.6 kg (135 lb 12.8 oz)   06/30/22 61.6 kg (135 lb 14.4 oz)   08/26/21 61.2 kg (135 lb)                    Mammogram Screening - Patient over age 75, has elected to continue with screening.  Pertinent mammograms are reviewed under the imaging tab.    Review of Systems   Constitutional:  Negative for chills and fever.   HENT:  Positive for hearing loss. Negative for congestion, ear pain and sore throat.    Eyes:  Positive for visual disturbance. Negative for pain.   Respiratory:  Negative for cough and shortness of  "breath.    Cardiovascular:  Negative for chest pain, palpitations and peripheral edema.   Gastrointestinal:  Negative for abdominal pain, constipation, diarrhea, heartburn, hematochezia and nausea.   Breasts:  Negative for tenderness, breast mass and discharge.   Genitourinary:  Negative for dysuria, frequency, genital sores, hematuria, pelvic pain, urgency, vaginal bleeding and vaginal discharge.   Musculoskeletal:  Negative for arthralgias, joint swelling and myalgias.   Skin:  Negative for rash.   Neurological:  Negative for dizziness, weakness, headaches and paresthesias.   Psychiatric/Behavioral:  Negative for mood changes. The patient is not nervous/anxious.      Needs new glasses.    Hearing aids working well.    Left 2nd toe with hammer deformity.  Rubbing sore on the top of toe in closed toed shoes.      OBJECTIVE:   /74   Pulse 51   Temp 98  F (36.7  C) (Oral)   Resp 13   Ht 1.613 m (5' 3.5\")   Wt 61.6 kg (135 lb 12.8 oz)   SpO2 99%   BMI 23.68 kg/m   Estimated body mass index is 23.68 kg/m  as calculated from the following:    Height as of this encounter: 1.613 m (5' 3.5\").    Weight as of this encounter: 61.6 kg (135 lb 12.8 oz).  Physical Exam  GENERAL APPEARANCE: healthy, alert and no distress  EYES: Eyes grossly normal to inspection, PERRL and conjunctivae and sclerae normal  HENT: ear canals and TM's normal, nose and mouth without ulcers or lesions, oropharynx clear and oral mucous membranes moist  NECK: no adenopathy, no asymmetry, masses, or scars and thyroid normal to palpation  RESP: lungs clear to auscultation - no rales, rhonchi or wheezes  BREAST: normal without masses, tenderness or nipple discharge and no palpable axillary masses or adenopathy  CV: regular rate and rhythm, normal S1 S2, no S3 or S4, no murmur, click or rub, no peripheral edema and peripheral pulses strong  ABDOMEN: soft, nontender, no hepatosplenomegaly, no masses and bowel sounds normal  MS: no musculoskeletal " defects are noted, gait is age appropriate without ataxia, left 2nd toe with hammer deformity. No open area noted.    SKIN: no suspicious lesions or rashes  NEURO: Normal strength and tone, sensory exam grossly normal, mentation intact and speech normal  PSYCH: mentation appears normal and affect normal/bright    Diagnostic Test Results:  Labs reviewed in Epic    ASSESSMENT / PLAN:   (Z00.00) Encounter for Medicare annual wellness exam  (primary encounter diagnosis)  Comment: non-fasting.   Plan: screening and preventative care discussed.     (F41.9) Anxiety  (F32.1) Moderate major depression (H)  Comment: symptoms under good control.   Plan: FLUoxetine (PROZAC) 10 MG capsule        Weaning medication planned.  To 10 mg daily and wean to discontinue in spring if continues to do well with regard to mood symptoms.      (M20.42) Hammer toe of left foot  Comment:   Plan: splinting - will obtain at drug store.  If pain or irritation on the toe noted with change to closed toes shoes, referral to podiatry can be given.     (Z13.220) Screening, lipid  Comment:   Plan: Lipid panel reflex to direct LDL Non-fasting            (Z13.1) Screening for diabetes mellitus  Comment:   Plan: Comprehensive metabolic panel (BMP + Alb, Alk         Phos, ALT, AST, Total. Bili, TP)        Nonfasting.    (Z13.0) Screening, anemia, deficiency, iron  Comment:   Plan: CBC with platelets            Patient has been advised of split billing requirements and indicates understanding: Yes      COUNSELING:  Reviewed preventive health counseling, as reflected in patient instructions       Regular exercise       Healthy diet/nutrition       Vision screening       Hearing screening       Dental care       Bladder control       Fall risk prevention       Immunizations  Declined: Covid-19 due to Concerns about side effects/safety             Osteoporosis prevention/bone health        She reports that she quit smoking about 46 years ago. Her smoking use  included cigarettes. She started smoking about 62 years ago. She has a 34.00 pack-year smoking history. She has never used smokeless tobacco.      Appropriate preventive services were discussed with this patient, including applicable screening as appropriate for cardiovascular disease, diabetes, osteopenia/osteoporosis, and glaucoma.  As appropriate for age/gender, discussed screening for colorectal cancer, prostate cancer, breast cancer, and cervical cancer. Checklist reviewing preventive services available has been given to the patient.    Reviewed patients plan of care and provided an AVS. The Basic Care Plan (routine screening as documented in Health Maintenance) for Nicki meets the Care Plan requirement. This Care Plan has been established and reviewed with the Patient.          Melonie Lucia MD  [facility]    Identified Health Risks:  I have reviewed Opioid Use Disorder and Substance Use Disorder risk factors and made any needed referrals. The patient's PHQ-9 score is consistent with mild depression. She was provided with information regarding depression and was advised to schedule a follow up appointment in 6 months to further address this issue.  Symptoms under good control   She is at risk for falling and has been provided with information to reduce the risk of falling at home.        Answers submitted by the patient for this visit:  Patient Health Questionnaire (Submitted on 8/31/2023)  If you checked off any problems, how difficult have these problems made it for you to do your work, take care of things at home, or get along with other people?: Somewhat difficult  PHQ9 TOTAL SCORE: 5      Patient Instructions   Labs today.    Decrease fluoxetine to 10 mg daily.  If you continue to have mood symptoms under good control, you can consider discontinuing medication in Spring 2024      Mammogram after December 12.    For the [place]oe area - use cushioning brace.  If over the  counter ones not effective, notify me and I will place referral to podiatry.

## 2023-09-02 NOTE — RESULT ENCOUNTER NOTE
Your cholesterol level is similar to previous.  When considering overall risk factors and these results, you would be a candidate to consider cholesterol-lowering medication.  If you would like to consider this please send me a message.  Your blood sugar, kidney function and liver testing are all normal.  Your blood cell counts are normal.  Please call or MyChart message me if you have any questions.      TRICIA

## 2023-09-28 ENCOUNTER — MYC MEDICAL ADVICE (OUTPATIENT)
Dept: FAMILY MEDICINE | Facility: CLINIC | Age: 78
End: 2023-09-28

## 2023-09-28 NOTE — TELEPHONE ENCOUNTER
See 9/28/23 [employer] message sent by RN to pt with the following references in attempt to answer pt's questions in 9/28/2023 12:59 PM message below.     RN encouraged pt to reply to this initial pt message if she has additional questions for provider about her results, risk factors, or statins, so pt responses can be forwarded to PCP.        MALENA UpN, RN

## 2023-11-17 ENCOUNTER — TRANSFERRED RECORDS (OUTPATIENT)
Dept: HEALTH INFORMATION MANAGEMENT | Facility: CLINIC | Age: 78
End: 2023-11-17
Payer: COMMERCIAL

## 2023-12-08 ENCOUNTER — MYC REFILL (OUTPATIENT)
Dept: FAMILY MEDICINE | Facility: CLINIC | Age: 78
End: 2023-12-08
Payer: COMMERCIAL

## 2023-12-08 DIAGNOSIS — F41.9 ANXIETY: ICD-10-CM

## 2023-12-08 DIAGNOSIS — F32.1 MODERATE MAJOR DEPRESSION (H): ICD-10-CM

## 2023-12-08 RX ORDER — FLUOXETINE 10 MG/1
10 CAPSULE ORAL DAILY
Qty: 90 CAPSULE | Refills: 1 | OUTPATIENT
Start: 2023-12-08

## 2023-12-13 ENCOUNTER — TELEPHONE (OUTPATIENT)
Dept: FAMILY MEDICINE | Facility: CLINIC | Age: 78
End: 2023-12-13
Payer: COMMERCIAL

## 2023-12-13 DIAGNOSIS — F41.9 ANXIETY: ICD-10-CM

## 2023-12-13 DIAGNOSIS — F32.1 MODERATE MAJOR DEPRESSION (H): ICD-10-CM

## 2023-12-13 NOTE — TELEPHONE ENCOUNTER
Pt calling in regard to her [employer] message that she sent on 12/8    FLUoxetine (PROZAC) 10 MG capsule [Melonie Lucia]      Patient Comment: Dr Diaz, please go back to 20 mg fir my fluoxetine. Im am not functioning well on 20 mg. It is time for a renewal thank you, Nicki

## 2023-12-14 NOTE — TELEPHONE ENCOUNTER
This mychart message was never routed to me.  New mychart message sent with questionnaires.  Follow up is recommended in 2-3 weeks.  Script for 20 mg sent to pharmacy.      PSK

## 2024-01-10 DIAGNOSIS — F32.1 MODERATE MAJOR DEPRESSION (H): ICD-10-CM

## 2024-01-10 DIAGNOSIS — F41.9 ANXIETY: ICD-10-CM

## 2024-02-05 DIAGNOSIS — F41.9 ANXIETY: ICD-10-CM

## 2024-02-05 DIAGNOSIS — F32.1 MODERATE MAJOR DEPRESSION (H): ICD-10-CM

## 2024-02-06 NOTE — TELEPHONE ENCOUNTER
8/26/2021    11:07 AM 6/29/2022     4:10 PM 8/31/2023     7:19 AM   PHQ   PHQ-9 Total Score 6 5 5   Q9: Thoughts of better off dead/self-harm past 2 weeks Not at all Not at all Not at all         12/18/2020     9:53 AM 6/30/2022     3:27 PM 8/31/2023     7:19 AM   ALEN-7 SCORE   Total Score 1 2 2       Please call patient re:  follow up appointment needed for mood symptoms.  Refill sent  Assist her with scheduling.  Virtual video/telephone if symptoms are not well controlled or evisit is ok for this if symptoms are controlled.        PSK

## 2024-02-06 NOTE — TELEPHONE ENCOUNTER
This writer attempted to contact patient on 02/06/24.    Reason for call provider's message and left message to call clinic back at [phone].    If patient calls back:   Relay message below (inform Fluoxetine refill sent). Assist with scheduling follow up appointment. Document that pt called and close encounter.      ROCIO Abreu  [facility]

## 2024-02-08 NOTE — TELEPHONE ENCOUNTER
RN called patient and LVM to call clinic at [phone].     If patient calls back:              Relay message below (inform Fluoxetine refill sent). Assist with scheduling follow up appointment. Document that pt called and close encounter.    ROCIO Aquino  [facility]

## 2024-02-14 ENCOUNTER — VIRTUAL VISIT (OUTPATIENT)
Dept: FAMILY MEDICINE | Facility: CLINIC | Age: 79
End: 2024-02-14
Payer: COMMERCIAL

## 2024-02-14 DIAGNOSIS — E78.5 HYPERLIPIDEMIA LDL GOAL <130: Primary | ICD-10-CM

## 2024-02-14 DIAGNOSIS — F32.1 MODERATE MAJOR DEPRESSION (H): ICD-10-CM

## 2024-02-14 DIAGNOSIS — F41.9 ANXIETY: ICD-10-CM

## 2024-02-14 PROCEDURE — 99214 OFFICE O/P EST MOD 30 MIN: CPT | Mod: 95 | Performed by: FAMILY MEDICINE

## 2024-02-14 ASSESSMENT — PATIENT HEALTH QUESTIONNAIRE - PHQ9
10. IF YOU CHECKED OFF ANY PROBLEMS, HOW DIFFICULT HAVE THESE PROBLEMS MADE IT FOR YOU TO DO YOUR WORK, TAKE CARE OF THINGS AT HOME, OR GET ALONG WITH OTHER PEOPLE: NOT DIFFICULT AT ALL
SUM OF ALL RESPONSES TO PHQ QUESTIONS 1-9: 2
SUM OF ALL RESPONSES TO PHQ QUESTIONS 1-9: 2

## 2024-02-14 NOTE — PROGRESS NOTES
Preventive Care Visit  [facility]  Melonie Lucia MD, Family Medicine  Feb 14, 2024  {Provider  Link to SmartSet :035142}  {PROVIDER CHARTING PREFERENCE:937552}    Brant Caceres is a 78 year old, presenting for the following:  Recheck Medication  Prozac    {(!) Visit Details have not yet been documented.  Please enter Visit Details and then use this list to pull in documentation. (Optional):262628}  {ROOMER if patient is in their first year of Medicare a vision screen is required click here to document the Vison screen and then refresh the note to pull in results  :771019}    Health Care Directive  Patient has a Health Care Directive on file  {(AWV REQUIRED) Advanced Care Planning Reviewed:204164}    History of Present Illness       Reason for visit:  I have a telemed visit on 2/14 to discuss medication    She eats 4 or more servings of fruits and vegetables daily.She consumes 1 sweetened beverage(s) daily.She exercises with enough effort to increase her heart rate 30 to 60 minutes per day.  She exercises with enough effort to increase her heart rate 5 days per week.   She is taking medications regularly.    ***  {SUPERLIST (Optional):241935}  {additonal problems for provider to add (Optional):693432}      8/31/2023   General Health   How would you rate your overall physical health? Excellent         8/31/2023   Nutrition   At least 4 servings of fruits and vegetables/day Yes         8/31/2023   Exercise   Frequency of exercise: 4-5 days/week            8/31/2023   Fall Risk   Fallen 2 or more times in the past year? Yes          8/31/2023   Activities of Daily Living- Home Safety   Needs help with the following daily activites NO assistance is needed   Safety concerns in the home None of the above          No data to display                  8/31/2023   Hearing Screening   Hearing concerns? No concerns            No data to display                   No data to display                 Today's PHQ-9 Score:       2024     6:46 AM   PHQ-9 SCORE   PHQ-9 Total Score 1         2023   Substance Use   Alcohol more than 3/day or more than 7/wk No     Social History     Tobacco Use    Smoking status: Former     Packs/day: 2.00     Years: 17.00     Additional pack years: 0.00     Total pack years: 34.00     Types: Cigarettes     Start date: 10/20/1960     Quit date: 1977     Years since quittin.7    Smokeless tobacco: Never    Tobacco comments:     Too much, thank God that's done   Vaping Use    Vaping Use: Never used   Substance Use Topics    Alcohol use: Yes     Comment: Seldom, one glass wine with a meal now and then    Drug use: Not Currently     Types: Marijuana, Amphetamines, LSD     Comment: Amph-Marij were often in 20 s, LSD one time     {If there are gaps in the social history shown above, please follow the link to update and then refresh the note Link to Social and Substance History :814150}       No data to display              {If any of the questions to the FHS7 are answered yes, consider referral for genetic counseling.    Additional indications for genetic referral include personal history of breast or ovarian cancer, genetic mutation in 1st degree relative which increases risk of breast cancer including BRCA1, BRCA2, MERON, PALB 2, TP53, CHEK2, PTEN, CDH1, STK11 (per ACS) and/or 1st degree relative with history of pancreatic or high-risk prostate cancer (per NCCN):405383}   {Mammogram Decision Support (Optional):455941}    The 10-year ASCVD risk score (Jessica DK, et al., 2019) is: 20.5%    Values used to calculate the score:      Age: 78 years      Sex: Female      Is Non- : No      Diabetic: No      Tobacco smoker: No      Systolic Blood Pressure: 138 mmHg      Is BP treated: No      HDL Cholesterol: 61 mg/dL      Total Cholesterol: 218 mg/dL    {Link to Fracture Risk Assessment Tool (Optional):645775}    {Use the storyboard to review patient  "history, after sections have been marked as reviewed, refresh note to capture documentation:232059}  { REQUIRED AWV use this link to review and update sexual activity history  after section has been marked as reviewed, refresh note to capture documentation:121580}  Reviewed and updated as needed this visit by Provider                    {HISTORY OPTIONS (Optional):540856}  Current providers sharing in care for this patient include:  Patient Care Team:  Melonie Lucia MD as PCP - General  Melonie Lucia MD as Assigned PCP    The following health maintenance items are reviewed in Epic and correct as of today:  Health Maintenance   Topic Date Due    COVID-19 Vaccine (1) Never done    IPV IMMUNIZATION (2 of 3 - Adult catch-up series) 10/20/2000    RSV VACCINE (Pregnancy & 60+) (1 - 1-dose 60+ series) Never done    ANNUAL REVIEW OF HM ORDERS  06/30/2023    INFLUENZA VACCINE (1) 09/01/2023    PHQ-9  08/14/2024    MEDICARE ANNUAL WELLNESS VISIT  08/31/2024    FALL RISK ASSESSMENT  08/31/2024    DEXA  12/04/2024    GLUCOSE  08/31/2026    LIPID  08/31/2028    ADVANCE CARE PLANNING  08/31/2028    DTAP/TDAP/TD IMMUNIZATION (4 - Td or Tdap) 05/11/2030    HEPATITIS C SCREENING  Completed    DEPRESSION ACTION PLAN  Completed    Pneumococcal Vaccine: 65+ Years  Completed    ZOSTER IMMUNIZATION  Completed    HPV IMMUNIZATION  Aged Out    MENINGITIS IMMUNIZATION  Aged Out    RSV MONOCLONAL ANTIBODY  Aged Out    MAMMO SCREENING  Discontinued    COLORECTAL CANCER SCREENING  Discontinued       {ROS Picklists (Optional):267220}     Objective    Exam  There were no vitals taken for this visit.   Estimated body mass index is 23.68 kg/m  as calculated from the following:    Height as of 8/31/23: 1.613 m (5' 3.5\").    Weight as of 8/31/23: 61.6 kg (135 lb 12.8 oz).    Physical Exam  {Exam Choices (Optional):150874}  {The Mini-Cog is incomplete, use link to complete and refresh note Link to Mini-Cog :176523}       No data to display    " "          {A Mini-Cog total score of 0-2 suggests the possibility of dementia, score of 3-5 suggests no dementia:642972}       Signed Electronically by: Melonie Lucia MD  {Email feedback regarding this note to [email or web address]   :081329}    Instructions Relayed to Patient by Virtual Roomer:     Patient is active on OnePIN:   Relayed following to patient: \"It looks like you are active on OnePIN, are you able to join the visit this way? If not, do you need us to send you a link now or would you like your provider to send a link via text or email when they are ready to initiate the visit?\"    Reminded patient to ensure they were logged on to virtual visit by arrival time listed. Documented in appointment notes if patient had flexibility to initiate visit sooner than arrival time. If pediatric virtual visit, ensured pediatric patient along with parent/guardian will be present for video visit.     Patient offered the website [email or web address] and/or phone number to OnePIN Help line: [phone]   "

## 2024-02-14 NOTE — PROGRESS NOTES
Nicki is a 78 year old who is being evaluated via a billable video visit.      How would you like to obtain your AVS? MyChart  If the video visit is dropped, the invitation should be resent by: Text to cell phone: [phone]  Will anyone else be joining your video visit? No          Assessment & Plan     Moderate major depression (H)  Anxiety  Symptoms well-controlled.  Continue current fluoxetine.  Refill given.  Follow-up with annual exam.  - FLUoxetine (PROZAC) 20 MG capsule; Take 1 capsule (20 mg) by mouth daily    Hyperlipidemia LDL goal <130  Declines statin use but is using red yeast rice as a treatment for this.  Will plan to recheck cholesterol levels with her upcoming wellness exam.    Review of the result(s) of each unique test - cholesterol  Prescription drug management          Patient Instructions   Continue current fluoxetine dose 20 mg daily.    We will plan to recheck cholesterol level on the current supplement you are using with your annual exam in early September.    Subjective   Nicki is a 78 year old, presenting for the following health issues:  Recheck Medication      2/14/2024     6:48 AM   Additional Questions   Roomed by Ramonita LEY   Accompanied by Self         2/14/2024     6:48 AM   Patient Reported Additional Medications   Patient reports taking the following new medications None     History of Present Illness       Reason for visit:  I have a telemed visit on 2/14 to discuss medication    She eats 4 or more servings of fruits and vegetables daily.She consumes 1 sweetened beverage(s) daily.She exercises with enough effort to increase her heart rate 30 to 60 minutes per day.  She exercises with enough effort to increase her heart rate 5 days per week.   She is taking medications regularly.   Walking and exercising in AM.    Depression and Anxiety Follow-Up  How are you doing with your depression since your last visit? Improved since resuming 20 mg dose fluoxetine  How are you doing  with your anxiety since your last visit?  No change  Are you having other symptoms that might be associated with depression or anxiety? No  Have you had a significant life event? No   Do you have any concerns with your use of alcohol or other drugs? No  When weaning the fluoxetine did have a worsening of mood  -this improved with returning to her previously effective dose of 20 mg daily.  Social History     Tobacco Use    Smoking status: Former     Packs/day: 2.00     Years: 17.00     Additional pack years: 0.00     Total pack years: 34.00     Types: Cigarettes     Start date: 10/20/1960     Quit date: 1977     Years since quittin.7    Smokeless tobacco: Never    Tobacco comments:     Too much, thank God that's done   Vaping Use    Vaping Use: Never used   Substance Use Topics    Alcohol use: Yes     Comment: Seldom, one glass wine with a meal now and then    Drug use: Not Currently     Types: Marijuana, Amphetamines, LSD     Comment: Amph-Marij were often in 20 s, LSD one time         2023     7:19 AM 2024     6:41 AM 2024     6:46 AM   PHQ   PHQ-9 Total Score 5 2 1   Q9: Thoughts of better off dead/self-harm past 2 weeks Not at all Not at all Not at all         2020     9:53 AM 2022     3:27 PM 2023     7:19 AM   ALEN-7 SCORE   Total Score 1 2 2         Suicide Assessment Five-step Evaluation and Treatment (SAFE-T)      Taking herbal medication for hand pain - tumeric and others. Helping with symptoms.    Medication to lower cholesterol - red yeast rice - 4 months of medication.        Review of Systems  Constitutional, HEENT, cardiovascular, pulmonary, gi and gu systems are negative, except as otherwise noted.      Objective           Vitals:  No vitals were obtained today due to virtual visit.    Physical Exam   GENERAL: alert and no distress  EYES: Eyes grossly normal to inspection.  No discharge or erythema, or obvious scleral/conjunctival abnormalities.  RESP: No  audible wheeze, cough, or visible cyanosis.    SKIN: Visible skin clear. No significant rash, abnormal pigmentation or lesions.  NEURO: Cranial nerves grossly intact.  Mentation and speech appropriate for age.  PSYCH: Appropriate affect, tone, and pace of words          Video-Visit Details    Type of service:  Video Visit   Video Start Time:  7:00 AM  Video End Time:7:18 AM    Originating Location (pt. Location): Home    Distant Location (provider location):  Off-site  Platform used for Video Visit: Danielle  Signed Electronically by: Melonie Lucia MD          This chart was documented by provider using a voice activated software called Dragon in addition to manual typing. There may be vocabulary errors or other grammatical errors due to this.

## 2024-08-11 DIAGNOSIS — F41.9 ANXIETY: ICD-10-CM

## 2024-08-11 DIAGNOSIS — F32.1 MODERATE MAJOR DEPRESSION (H): ICD-10-CM

## 2024-08-13 NOTE — TELEPHONE ENCOUNTER
8/31/2023     7:19 AM 2/14/2024     6:41 AM 2/14/2024     6:46 AM   PHQ   PHQ-9 Total Score 5 2 1   Q9: Thoughts of better off dead/self-harm past 2 weeks Not at all Not at all Not at all         12/18/2020     9:53 AM 6/30/2022     3:27 PM 8/31/2023     7:19 AM   ALEN-7 SCORE   Total Score 1 2 2   Has follow up scheduled for September.

## 2024-09-08 ASSESSMENT — PATIENT HEALTH QUESTIONNAIRE - PHQ9
SUM OF ALL RESPONSES TO PHQ QUESTIONS 1-9: 6
10. IF YOU CHECKED OFF ANY PROBLEMS, HOW DIFFICULT HAVE THESE PROBLEMS MADE IT FOR YOU TO DO YOUR WORK, TAKE CARE OF THINGS AT HOME, OR GET ALONG WITH OTHER PEOPLE: SOMEWHAT DIFFICULT
SUM OF ALL RESPONSES TO PHQ QUESTIONS 1-9: 6

## 2024-09-08 ASSESSMENT — SOCIAL DETERMINANTS OF HEALTH (SDOH): HOW OFTEN DO YOU GET TOGETHER WITH FRIENDS OR RELATIVES?: ONCE A WEEK

## 2024-09-09 ENCOUNTER — OFFICE VISIT (OUTPATIENT)
Dept: FAMILY MEDICINE | Facility: CLINIC | Age: 79
End: 2024-09-09
Attending: FAMILY MEDICINE
Payer: COMMERCIAL

## 2024-09-09 VITALS
BODY MASS INDEX: 23.12 KG/M2 | HEIGHT: 64 IN | SYSTOLIC BLOOD PRESSURE: 134 MMHG | WEIGHT: 135.4 LBS | HEART RATE: 59 BPM | DIASTOLIC BLOOD PRESSURE: 77 MMHG | OXYGEN SATURATION: 98 % | TEMPERATURE: 97.6 F | RESPIRATION RATE: 18 BRPM

## 2024-09-09 DIAGNOSIS — F41.9 ANXIETY: ICD-10-CM

## 2024-09-09 DIAGNOSIS — F32.1 MODERATE MAJOR DEPRESSION (H): ICD-10-CM

## 2024-09-09 DIAGNOSIS — Z13.1 SCREENING FOR DIABETES MELLITUS: ICD-10-CM

## 2024-09-09 DIAGNOSIS — Z12.31 ENCOUNTER FOR SCREENING MAMMOGRAM FOR BREAST CANCER: ICD-10-CM

## 2024-09-09 DIAGNOSIS — Z78.0 POST-MENOPAUSAL: ICD-10-CM

## 2024-09-09 DIAGNOSIS — Z00.00 ENCOUNTER FOR MEDICARE ANNUAL WELLNESS EXAM: Primary | ICD-10-CM

## 2024-09-09 DIAGNOSIS — E87.1 HYPONATREMIA: ICD-10-CM

## 2024-09-09 DIAGNOSIS — Z13.0 SCREENING, ANEMIA, DEFICIENCY, IRON: ICD-10-CM

## 2024-09-09 DIAGNOSIS — M81.0 AGE-RELATED OSTEOPOROSIS WITHOUT CURRENT PATHOLOGICAL FRACTURE: ICD-10-CM

## 2024-09-09 DIAGNOSIS — E78.5 HYPERLIPIDEMIA LDL GOAL <130: ICD-10-CM

## 2024-09-09 LAB
ALBUMIN SERPL BCG-MCNC: 4.2 G/DL (ref 3.5–5.2)
ALP SERPL-CCNC: 72 U/L (ref 40–150)
ALT SERPL W P-5'-P-CCNC: 11 U/L (ref 0–50)
ANION GAP SERPL CALCULATED.3IONS-SCNC: 8 MMOL/L (ref 7–15)
AST SERPL W P-5'-P-CCNC: 26 U/L (ref 0–45)
BILIRUB SERPL-MCNC: 0.3 MG/DL
BUN SERPL-MCNC: 10.9 MG/DL (ref 8–23)
CALCIUM SERPL-MCNC: 9.3 MG/DL (ref 8.8–10.4)
CHLORIDE SERPL-SCNC: 96 MMOL/L (ref 98–107)
CHOLEST SERPL-MCNC: 223 MG/DL
CREAT SERPL-MCNC: 0.74 MG/DL (ref 0.51–0.95)
EGFRCR SERPLBLD CKD-EPI 2021: 83 ML/MIN/1.73M2
ERYTHROCYTE [DISTWIDTH] IN BLOOD BY AUTOMATED COUNT: 12.8 % (ref 10–15)
FASTING STATUS PATIENT QL REPORTED: NO
FASTING STATUS PATIENT QL REPORTED: NO
GLUCOSE SERPL-MCNC: 90 MG/DL (ref 70–99)
HCO3 SERPL-SCNC: 27 MMOL/L (ref 22–29)
HCT VFR BLD AUTO: 39.9 % (ref 35–47)
HDLC SERPL-MCNC: 59 MG/DL
HGB BLD-MCNC: 13.5 G/DL (ref 11.7–15.7)
LDLC SERPL CALC-MCNC: 142 MG/DL
MCH RBC QN AUTO: 30.9 PG (ref 26.5–33)
MCHC RBC AUTO-ENTMCNC: 33.8 G/DL (ref 31.5–36.5)
MCV RBC AUTO: 91 FL (ref 78–100)
NONHDLC SERPL-MCNC: 164 MG/DL
PLATELET # BLD AUTO: 215 10E3/UL (ref 150–450)
POTASSIUM SERPL-SCNC: 4.7 MMOL/L (ref 3.4–5.3)
PROT SERPL-MCNC: 7.2 G/DL (ref 6.4–8.3)
RBC # BLD AUTO: 4.37 10E6/UL (ref 3.8–5.2)
SODIUM SERPL-SCNC: 131 MMOL/L (ref 135–145)
TRIGL SERPL-MCNC: 109 MG/DL
WBC # BLD AUTO: 7.2 10E3/UL (ref 4–11)

## 2024-09-09 PROCEDURE — 85027 COMPLETE CBC AUTOMATED: CPT | Performed by: FAMILY MEDICINE

## 2024-09-09 PROCEDURE — 99214 OFFICE O/P EST MOD 30 MIN: CPT | Mod: 25 | Performed by: FAMILY MEDICINE

## 2024-09-09 PROCEDURE — 36415 COLL VENOUS BLD VENIPUNCTURE: CPT | Performed by: FAMILY MEDICINE

## 2024-09-09 PROCEDURE — 80061 LIPID PANEL: CPT | Performed by: FAMILY MEDICINE

## 2024-09-09 PROCEDURE — G0439 PPPS, SUBSEQ VISIT: HCPCS | Performed by: FAMILY MEDICINE

## 2024-09-09 PROCEDURE — 80053 COMPREHEN METABOLIC PANEL: CPT | Performed by: FAMILY MEDICINE

## 2024-09-09 ASSESSMENT — PAIN SCALES - GENERAL: PAINLEVEL: NO PAIN (0)

## 2024-09-09 NOTE — PATIENT INSTRUCTIONS
Mammogram recommended.  [facility]     Bone density recommended.  You can discuss coverage at the time of scheduling.      Labs today.  Please send message with dose of red yeast rice.    Fluoxetine refill will be sent when results return.          Patient Education   Preventive Care Advice   This is general advice given by our system to help you stay healthy. However, your care team may have specific advice just for you. Please talk to your care team about your preventive care needs.  Nutrition  Eat 5 or more servings of fruits and vegetables each day.  Try wheat bread, brown rice and whole grain pasta (instead of white bread, rice, and pasta).  Get enough calcium and vitamin D. Check the label on foods and aim for 100% of the RDA (recommended daily allowance).  Lifestyle  Exercise at least 150 minutes each week  (30 minutes a day, 5 days a week).  Do muscle strengthening activities 2 days a week. These help control your weight and prevent disease.  No smoking.  Wear sunscreen to prevent skin cancer.  Have a dental exam and cleaning every 6 months.  Yearly exams  See your health care team every year to talk about:  Any changes in your health.  Any medicines your care team has prescribed.  Preventive care, family planning, and ways to prevent chronic diseases.  Shots (vaccines)   HPV shots (up to age 26), if you've never had them before.  Hepatitis B shots (up to age 59), if you've never had them before.  COVID-19 shot: Get this shot when it's due.  Flu shot: Get a flu shot every year.  Tetanus shot: Get a tetanus shot every 10 years.  Pneumococcal, hepatitis A, and RSV shots: Ask your care team if you need these based on your risk.  Shingles shot (for age 50 and up)  General health tests  Diabetes screening:  Starting at age 35, Get screened for diabetes at least every 3 years.  If you are younger than age 35, ask your care team if you should be screened for  diabetes.  Cholesterol test: At age 39, start having a cholesterol test every 5 years, or more often if advised.  Bone density scan (DEXA): At age 50, ask your care team if you should have this scan for osteoporosis (brittle bones).  Hepatitis C: Get tested at least once in your life.  STIs (sexually transmitted infections)  Before age 24: Ask your care team if you should be screened for STIs.  After age 24: Get screened for STIs if you're at risk. You are at risk for STIs (including HIV) if:  You are sexually active with more than one person.  You don't use condoms every time.  You or a partner was diagnosed with a sexually transmitted infection.  If you are at risk for HIV, ask about PrEP medicine to prevent HIV.  Get tested for HIV at least once in your life, whether you are at risk for HIV or not.  Cancer screening tests  Cervical cancer screening: If you have a cervix, begin getting regular cervical cancer screening tests starting at age 21.  Breast cancer scan (mammogram): If you've ever had breasts, begin having regular mammograms starting at age 40. This is a scan to check for breast cancer.  Colon cancer screening: It is important to start screening for colon cancer at age 45.  Have a colonoscopy test every 10 years (or more often if you're at risk) Or, ask your provider about stool tests like a FIT test every year or Cologuard test every 3 years.  To learn more about your testing options, visit:   .  For help making a decision, visit:   https://bit.ly/dm21610.  Prostate cancer screening test: If you have a prostate, ask your care team if a prostate cancer screening test (PSA) at age 55 is right for you.  Lung cancer screening: If you are a current or former smoker ages 50 to 80, ask your care team if ongoing lung cancer screenings are right for you.  For informational purposes only. Not to replace the advice of your health care provider. Copyright   2023 [employer]. All rights reserved.  Clinically reviewed by the [facility]. [employer] [id number] - REV 01/24.  Hearing Loss: Care Instructions  Overview     Hearing loss is a sudden or slow decrease in how well you hear. It can range from slight to profound. Permanent hearing loss can occur with aging. It also can happen when you are exposed long-term to loud noise. Examples include listening to loud music, riding motorcycles, or being around other loud machines.  Hearing loss can affect your work and home life. It can make you feel lonely or depressed. You may feel that you have lost your independence. But hearing aids and other devices can help you hear better and feel connected to others.  Follow-up care is a key part of your treatment and safety. Be sure to make and go to all appointments, and call your doctor if you are having problems. It's also a good idea to know your test results and keep a list of the medicines you take.  How can you care for yourself at home?  Avoid loud noises whenever possible. This helps keep your hearing from getting worse.  Always wear hearing protection around loud noises.  Wear a hearing aid as directed.  A professional can help you pick a hearing aid that will work best for you.  You can also get hearing aids over the counter for mild to moderate hearing loss.  Have hearing tests as your doctor suggests. They can show whether your hearing has changed. Your hearing aid may need to be adjusted.  Use other devices as needed. These may include:  Telephone amplifiers and hearing aids that can connect to a television, stereo, radio, or microphone.  Devices that use lights or vibrations. These alert you to the doorbell, a ringing telephone, or a baby monitor.  Television closed-captioning. This shows the words at the bottom of the screen. Most new TVs can do this.  TTY (text telephone). This lets you type messages back and forth on the telephone instead of talking or listening. These devices are also  "called TDD. When messages are typed on the keyboard, they are sent over the phone line to a receiving TTY. The message is shown on a monitor.  Use text messaging, social media, and email if it is hard for you to communicate by telephone.  Try to learn a listening technique called speechreading. It is not lipreading. You pay attention to people's gestures, expressions, posture, and tone of voice. These clues can help you understand what a person is saying. Face the person you are talking to, and have them face you. Make sure the lighting is good. You need to see the other person's face clearly.  Think about counseling if you need help to adjust to your hearing loss.  When should you call for help?  Watch closely for changes in your health, and be sure to contact your doctor if:    You think your hearing is getting worse.     You have new symptoms, such as dizziness or nausea.   Where can you learn more?  Go to https://www.[employer]ob/patiented  Enter R798 in the search box to learn more about \"Hearing Loss: Care Instructions.\"  Current as of: September 27, 2023               Content Version: 14.0    4876-5641 [employer].   Care instructions adapted under license by your healthcare professional. If you have questions about a medical condition or this instruction, always ask your healthcare professional. [employer] disclaims any warranty or liability for your use of this information.      Learning About Sleeping Well  What does sleeping well mean?     Sleeping well means getting enough sleep to feel good and stay healthy. How much sleep is enough varies among people.  The number of hours you sleep and how you feel when you wake up are both important. If you do not feel refreshed, you probably need more sleep. Another sign of not getting enough sleep is feeling tired during the day.  Experts recommend that adults get at least 7 or more hours of sleep per day. Children and older adults need " "more sleep.  Why is getting enough sleep important?  Getting enough quality sleep is a basic part of good health. When your sleep suffers, your physical health, mood, and your thoughts can suffer too. You may find yourself feeling more grumpy or stressed. Not getting enough sleep also can lead to serious problems, including injury, accidents, anxiety, and depression.  What might cause poor sleeping?  Many things can cause sleep problems, including:  Changes to your sleep schedule.  Stress. Stress can be caused by fear about a single event, such as giving a speech. Or you may have ongoing stress, such as worry about work or school.  Depression, anxiety, and other mental or emotional conditions.  Changes in your sleep habits or surroundings. This includes changes that happen where you sleep, such as noise, light, or sleeping in a different bed. It also includes changes in your sleep pattern, such as having jet lag or working a late shift.  Health problems, such as pain, breathing problems, and restless legs syndrome.  Lack of regular exercise.  Using alcohol, nicotine, or caffeine before bed.  How can you help yourself?  Here are some tips that may help you sleep more soundly and wake up feeling more refreshed.  Your sleeping area   Use your bedroom only for sleeping and sex. A bit of light reading may help you fall asleep. But if it doesn't, do your reading elsewhere in the house. Try not to use your TV, computer, smartphone, or tablet while you are in bed.  Be sure your bed is big enough to stretch out comfortably, especially if you have a sleep partner.  Keep your bedroom quiet, dark, and cool. Use curtains, blinds, or a sleep mask to block out light. To block out noise, use earplugs, soothing music, or a \"white noise\" machine.  Your evening and bedtime routine   Create a relaxing bedtime routine. You might want to take a warm shower or bath, or listen to soothing music.  Go to bed at the same time every night. And " "get up at the same time every morning, even if you feel tired.  What to avoid   Limit caffeine (coffee, tea, caffeinated sodas) during the day, and don't have any for at least 6 hours before bedtime.  Avoid drinking alcohol before bedtime. Alcohol can cause you to wake up more often during the night.  Try not to smoke or use tobacco, especially in the evening. Nicotine can keep you awake.  Limit naps during the day, especially close to bedtime.  Avoid lying in bed awake for too long. If you can't fall asleep or if you wake up in the middle of the night and can't get back to sleep within about 20 minutes, get out of bed and go to another room until you feel sleepy.  Avoid taking medicine right before bed that may keep you awake or make you feel hyper or energized. Your doctor can tell you if your medicine may do this and if you can take it earlier in the day.  If you can't sleep   Imagine yourself in a peaceful, pleasant scene. Focus on the details and feelings of being in a place that is relaxing.  Get up and do a quiet or boring activity until you feel sleepy.  Avoid drinking any liquids before going to bed to help prevent waking up often to use the bathroom.  Where can you learn more?  Go to https://www.[employer].net/patiented  Enter J942 in the search box to learn more about \"Learning About Sleeping Well.\"  Current as of: July 10, 2023  Content Version: 14.1 2006-2024 [employer].   Care instructions adapted under license by your healthcare professional. If you have questions about a medical condition or this instruction, always ask your healthcare professional. [employer] disclaims any warranty or liability for your use of this information.    Learning About Depression Screening  What is depression screening?  Depression screening is a way to see if you have depression symptoms. It may be done by a doctor or counselor. It's often part of a routine checkup. That's because your " "mental health is just as important as your physical health.  Depression is a mental health condition that affects how you feel, think, and act. You may:  Have less energy.  Lose interest in your daily activities.  Feel sad and grouchy for a long time.  Depression is very common. It affects people of all ages.  Many things can lead to depression. Some people become depressed after they have a stroke or find out they have a major illness like cancer or heart disease. The death of a loved one or a breakup may lead to depression. It can run in families. Most experts believe that a combination of inherited genes and stressful life events can cause it.  What happens during screening?  You may be asked to fill out a form about your depression symptoms. You and the doctor will discuss your answers. The doctor may ask you more questions to learn more about how you think, act, and feel.  What happens after screening?  If you have symptoms of depression, your doctor will talk to you about your options.  Doctors usually treat depression with medicines or counseling. Often, combining the two works best. Many people don't get help because they think that they'll get over the depression on their own. But people with depression may not get better unless they get treatment.  The cause of depression is not well understood. There may be many factors involved. But if you have depression, it's not your fault.  A serious symptom of depression is thinking about death or suicide. If you or someone you care about talks about this or about feeling hopeless, get help right away.  It's important to know that depression can be treated. Medicine, counseling, and self-care may help.  Where can you learn more?  Go to https://www.[employer].net/patiented  Enter T185 in the search box to learn more about \"Learning About Depression Screening.\"  Current as of: June 24, 2023  Content Version: 14.1    1397-3880 [employer], [facility].   Care " instructions adapted under license by your healthcare professional. If you have questions about a medical condition or this instruction, always ask your healthcare professional. [employer] disclaims any warranty or liability for your use of this information.

## 2024-09-09 NOTE — PROGRESS NOTES
Preventive Care Visit  [facility]  Melonie Lucia MD, Family Medicine  Sep 9, 2024      Assessment & Plan     Encounter for Medicare annual wellness exam  Screening preventive care discussed.  Flu shot declined.    Age-related osteoporosis without current pathological fracture  Post-menopausal  Bone density testing performed last in 2009.  Left femoral neck with T-score -2.6 indicating osteoporosis.  Follow-up bone density planned.  - DX Bone Density; Future    Moderate major depression (H)  Anxiety  Fluoxetine is controlling her mood symptoms well.  Hyponatremia is noted on testing today.  Refills given for 3 months but a plan for recheck of her sodium in 2 to 3 months is recommended.  See below.    Hyperlipidemia LDL goal <130  No significant improvement in her cholesterol panel with the red yeast rice.  We did discuss that is 79 the benefit of statin may be less well-documented but may still give her some prevention.  Message sent with this information requesting her response.  - Lipid panel reflex to direct LDL Non-fasting; Future  - Lipid panel reflex to direct LDL Non-fasting    Screening for diabetes mellitus  Normal blood sugar  - Comprehensive metabolic panel (BMP + Alb, Alk Phos, ALT, AST, Total. Bili, TP); Future  - Comprehensive metabolic panel (BMP + Alb, Alk Phos, ALT, AST, Total. Bili, TP)    Screening, anemia, deficiency, iron  Normal blood cell counts  - CBC with platelets; Future  - CBC with platelets    Encounter for screening mammogram for breast cancer  Mammogram planned  - MA Screen Bilateral w/Masoud; Future    Hyponatremia  Low sodium may be related to her fluoxetine use although she has been on this for an extended period of time.  Reassessment of this is planned in 2 to 3 months.  If continued low sodium is noted, alternative antidepressant may be needed.  - Renal panel (Alb, BUN, Ca, Cl, CO2, Creat, Gluc, Phos, K, Na); Future    Patient has been advised of split  billing requirements and indicates understanding: Yes        Counseling  Appropriate preventive services were addressed with this patient via screening, questionnaire, or discussion as appropriate for fall prevention, nutrition, physical activity, Tobacco-use cessation, social engagement, weight loss and cognition.  Checklist reviewing preventive services available has been given to the patient.  Reviewed patient's diet, addressing concerns and/or questions.   Discussed possible causes of fatigue. The patient was provided with written information regarding signs of hearing loss.   The patient's PHQ-9 score is consistent with mild depression. She was provided with information regarding depression.       Patient Instructions   Mammogram recommended.  Please call [facility] [place] [address] to set up this appointment     Bone density recommended.  You can discuss coverage at the time of scheduling.      Labs today.  Please send message with dose of red yeast rice.    Fluoxetine refill will be sent when results return.          Patient Education  Preventive Care Advice   This is general advice given by our system to help you stay healthy. However, your care team may have specific advice just for you. Please talk to your care team about your preventive care needs.  Nutrition  Eat 5 or more servings of fruits and vegetables each day.  Try wheat bread, brown rice and whole grain pasta (instead of white bread, rice, and pasta).  Get enough calcium and vitamin D. Check the label on foods and aim for 100% of the RDA (recommended daily allowance).  Lifestyle  Exercise at least 150 minutes each week  (30 minutes a day, 5 days a week).  Do muscle strengthening activities 2 days a week. These help control your weight and prevent disease.  No smoking.  Wear sunscreen to prevent skin cancer.  Have a dental exam and cleaning every 6 months.  Yearly exams  See your health care team every year to talk about:  Any changes in your health.  Any  medicines your care team has prescribed.  Preventive care, family planning, and ways to prevent chronic diseases.  Shots (vaccines)   HPV shots (up to age 26), if you've never had them before.  Hepatitis B shots (up to age 59), if you've never had them before.  COVID-19 shot: Get this shot when it's due.  Flu shot: Get a flu shot every year.  Tetanus shot: Get a tetanus shot every 10 years.  Pneumococcal, hepatitis A, and RSV shots: Ask your care team if you need these based on your risk.  Shingles shot (for age 50 and up)  General health tests  Diabetes screening:  Starting at age 35, Get screened for diabetes at least every 3 years.  If you are younger than age 35, ask your care team if you should be screened for diabetes.  Cholesterol test: At age 39, start having a cholesterol test every 5 years, or more often if advised.  Bone density scan (DEXA): At age 50, ask your care team if you should have this scan for osteoporosis (brittle bones).  Hepatitis C: Get tested at least once in your life.  STIs (sexually transmitted infections)  Before age 24: Ask your care team if you should be screened for STIs.  After age 24: Get screened for STIs if you're at risk. You are at risk for STIs (including HIV) if:  You are sexually active with more than one person.  You don't use condoms every time.  You or a partner was diagnosed with a sexually transmitted infection.  If you are at risk for HIV, ask about PrEP medicine to prevent HIV.  Get tested for HIV at least once in your life, whether you are at risk for HIV or not.  Cancer screening tests  Cervical cancer screening: If you have a cervix, begin getting regular cervical cancer screening tests starting at age 21.  Breast cancer scan (mammogram): If you've ever had breasts, begin having regular mammograms starting at age 40. This is a scan to check for breast cancer.  Colon cancer screening: It is important to start screening for colon cancer at age 45.  Have a colonoscopy  test every 10 years (or more often if you're at risk) Or, ask your provider about stool tests like a FIT test every year or Cologuard test every 3 years.  To learn more about your testing options, visit:   .  For help making a decision, visit:   [email or web address].  Prostate cancer screening test: If you have a prostate, ask your care team if a prostate cancer screening test (PSA) at age 55 is right for you.  Lung cancer screening: If you are a current or former smoker ages 50 to 80, ask your care team if ongoing lung cancer screenings are right for you.  For informational purposes only. Not to replace the advice of your health care provider. Copyright   2023 [employer]. All rights reserved. Clinically reviewed by the  [facility] [facility]. [employer] [id number] - REV 01/24.  Hearing Loss: Care Instructions  Overview     Hearing loss is a sudden or slow decrease in how well you hear. It can range from slight to profound. Permanent hearing loss can occur with aging. It also can happen when you are exposed long-term to loud noise. Examples include listening to loud music, riding motorcycles, or being around other loud machines.  Hearing loss can affect your work and home life. It can make you feel lonely or depressed. You may feel that you have lost your independence. But hearing aids and other devices can help you hear better and feel connected to others.  Follow-up care is a key part of your treatment and safety. Be sure to make and go to all appointments, and call your doctor if you are having problems. It's also a good idea to know your test results and keep a list of the medicines you take.  How can you care for yourself at home?  Avoid loud noises whenever possible. This helps keep your hearing from getting worse.  Always wear hearing protection around loud noises.  Wear a hearing aid as directed.  A professional can help you pick a hearing aid that will work best for you.  You can  "also get hearing aids over the counter for mild to moderate hearing loss.  Have hearing tests as your doctor suggests. They can show whether your hearing has changed. Your hearing aid may need to be adjusted.  Use other devices as needed. These may include:  Telephone amplifiers and hearing aids that can connect to a television, stereo, radio, or microphone.  Devices that use lights or vibrations. These alert you to the doorbell, a ringing telephone, or a baby monitor.  Television closed-captioning. This shows the words at the bottom of the screen. Most new TVs can do this.  TTY (text telephone). This lets you type messages back and forth on the telephone instead of talking or listening. These devices are also called TDD. When messages are typed on the keyboard, they are sent over the phone line to a receiving TTY. The message is shown on a monitor.  Use text messaging, social media, and email if it is hard for you to communicate by telephone.  Try to learn a listening technique called speechreading. It is not lipreading. You pay attention to people's gestures, expressions, posture, and tone of voice. These clues can help you understand what a person is saying. Face the person you are talking to, and have them face you. Make sure the lighting is good. You need to see the other person's face clearly.  Think about counseling if you need help to adjust to your hearing loss.  When should you call for help?  Watch closely for changes in your health, and be sure to contact your doctor if:    You think your hearing is getting worse.     You have new symptoms, such as dizziness or nausea.   Where can you learn more?  Go to https://www.[employer]Ac[employer].net/patiented  Enter R798 in the search box to learn more about \"Hearing Loss: Care Instructions.\"  Current as of: September 27, 2023               Content Version: 14.0    9098-7693 [employer].   Care instructions adapted under license by your healthcare professional. " If you have questions about a medical condition or this instruction, always ask your healthcare professional. Healthwise, [facility] disclaims any warranty or liability for your use of this information.      Learning About Sleeping Well  What does sleeping well mean?     Sleeping well means getting enough sleep to feel good and stay healthy. How much sleep is enough varies among people.  The number of hours you sleep and how you feel when you wake up are both important. If you do not feel refreshed, you probably need more sleep. Another sign of not getting enough sleep is feeling tired during the day.  Experts recommend that adults get at least 7 or more hours of sleep per day. Children and older adults need more sleep.  Why is getting enough sleep important?  Getting enough quality sleep is a basic part of good health. When your sleep suffers, your physical health, mood, and your thoughts can suffer too. You may find yourself feeling more grumpy or stressed. Not getting enough sleep also can lead to serious problems, including injury, accidents, anxiety, and depression.  What might cause poor sleeping?  Many things can cause sleep problems, including:  Changes to your sleep schedule.  Stress. Stress can be caused by fear about a single event, such as giving a speech. Or you may have ongoing stress, such as worry about work or school.  Depression, anxiety, and other mental or emotional conditions.  Changes in your sleep habits or surroundings. This includes changes that happen where you sleep, such as noise, light, or sleeping in a different bed. It also includes changes in your sleep pattern, such as having jet lag or working a late shift.  Health problems, such as pain, breathing problems, and restless legs syndrome.  Lack of regular exercise.  Using alcohol, nicotine, or caffeine before bed.  How can you help yourself?  Here are some tips that may help you sleep more soundly and wake up feeling more  "refreshed.  Your sleeping area   Use your bedroom only for sleeping and sex. A bit of light reading may help you fall asleep. But if it doesn't, do your reading elsewhere in the house. Try not to use your TV, computer, smartphone, or tablet while you are in bed.  Be sure your bed is big enough to stretch out comfortably, especially if you have a sleep partner.  Keep your bedroom quiet, dark, and cool. Use curtains, blinds, or a sleep mask to block out light. To block out noise, use earplugs, soothing music, or a \"white noise\" machine.  Your evening and bedtime routine   Create a relaxing bedtime routine. You might want to take a warm shower or bath, or listen to soothing music.  Go to bed at the same time every night. And get up at the same time every morning, even if you feel tired.  What to avoid   Limit caffeine (coffee, tea, caffeinated sodas) during the day, and don't have any for at least 6 hours before bedtime.  Avoid drinking alcohol before bedtime. Alcohol can cause you to wake up more often during the night.  Try not to smoke or use tobacco, especially in the evening. Nicotine can keep you awake.  Limit naps during the day, especially close to bedtime.  Avoid lying in bed awake for too long. If you can't fall asleep or if you wake up in the middle of the night and can't get back to sleep within about 20 minutes, get out of bed and go to another room until you feel sleepy.  Avoid taking medicine right before bed that may keep you awake or make you feel hyper or energized. Your doctor can tell you if your medicine may do this and if you can take it earlier in the day.  If you can't sleep   Imagine yourself in a peaceful, pleasant scene. Focus on the details and feelings of being in a place that is relaxing.  Get up and do a quiet or boring activity until you feel sleepy.  Avoid drinking any liquids before going to bed to help prevent waking up often to use the bathroom.  Where can you learn more?  Go to " "https://www.Ping[employer].net/patiented  Enter J942 in the search box to learn more about \"Learning About Sleeping Well.\"  Current as of: July 10, 2023  Content Version: 14.1 2006-2024 [employer].   Care instructions adapted under license by your healthcare professional. If you have questions about a medical condition or this instruction, always ask your healthcare professional. [employer] disclaims any warranty or liability for your use of this information.    Learning About Depression Screening  What is depression screening?  Depression screening is a way to see if you have depression symptoms. It may be done by a doctor or counselor. It's often part of a routine checkup. That's because your mental health is just as important as your physical health.  Depression is a mental health condition that affects how you feel, think, and act. You may:  Have less energy.  Lose interest in your daily activities.  Feel sad and grouchy for a long time.  Depression is very common. It affects people of all ages.  Many things can lead to depression. Some people become depressed after they have a stroke or find out they have a major illness like cancer or heart disease. The death of a loved one or a breakup may lead to depression. It can run in families. Most experts believe that a combination of inherited genes and stressful life events can cause it.  What happens during screening?  You may be asked to fill out a form about your depression symptoms. You and the doctor will discuss your answers. The doctor may ask you more questions to learn more about how you think, act, and feel.  What happens after screening?  If you have symptoms of depression, your doctor will talk to you about your options.  Doctors usually treat depression with medicines or counseling. Often, combining the two works best. Many people don't get help because they think that they'll get over the depression on their own. But people " "with depression may not get better unless they get treatment.  The cause of depression is not well understood. There may be many factors involved. But if you have depression, it's not your fault.  A serious symptom of depression is thinking about death or suicide. If you or someone you care about talks about this or about feeling hopeless, get help right away.  It's important to know that depression can be treated. Medicine, counseling, and self-care may help.  Where can you learn more?  Go to https://www.[employer]Man.net/patiented  Enter T185 in the search box to learn more about \"Learning About Depression Screening.\"  Current as of: June 24, 2023  Content Version: 14.1    4620-4979 [employer].   Care instructions adapted under license by your healthcare professional. If you have questions about a medical condition or this instruction, always ask your healthcare professional. [employer] disclaims any warranty or liability for your use of this information.         Brant Caceres is a 79 year old, presenting for the following:  Annual Visit        9/9/2024     2:44 PM   Additional Questions   Roomed by Vandana   Accompanied by self       Health Care Directive  Patient has a Health Care Directive on file  Advance care planning document is on file and is current.    HPI      Hyperlipidemia Follow-Up    Are you regularly taking any medication or supplement to lower your cholesterol?   Yes- red yeast rice  Are you having muscle aches or other side effects that you think could be caused by your cholesterol lowering medication?  No    Depression and Anxiety   How are you doing with your depression since your last visit? No change  How are you doing with your anxiety since your last visit?  No change  Are you having other symptoms that might be associated with depression or anxiety? No  Have you had a significant life event? No   Do you have any concerns with your use of alcohol or other drugs? " No    Social History     Tobacco Use    Smoking status: Former     Current packs/day: 0.00     Average packs/day: 2.0 packs/day for 17.0 years (33.9 ttl pk-yrs)     Types: Cigarettes     Start date: 10/20/1960     Quit date: 1977     Years since quittin.3    Smokeless tobacco: Never    Tobacco comments:     Too much, thank God that's done   Vaping Use    Vaping status: Never Used   Substance Use Topics    Alcohol use: Yes     Comment: Seldom, one glass wine with a meal now and then    Drug use: Not Currently     Types: Marijuana, Amphetamines, LSD     Comment: Amph-Marij were often in 20 s, LSD one time         2024     6:41 AM 2024     6:46 AM 2024     7:19 PM   PHQ   PHQ-9 Total Score 2 1 6   Q9: Thoughts of better off dead/self-harm past 2 weeks Not at all Not at all Not at all         2020     9:53 AM 2022     3:27 PM 2023     7:19 AM   ALEN-7 SCORE   Total Score 1 2 2         Suicide Assessment Five-step Evaluation and Treatment (SAFE-T)          2024   General Health   How would you rate your overall physical health? Excellent   Feel stress (tense, anxious, or unable to sleep) To some extent      (!) STRESS CONCERN  -  normal life stress.       2024   Nutrition   Diet: Breakfast skipped            2024   Exercise   Days per week of moderate/strenous exercise 5 days   Average minutes spent exercising at this level 30 min    Walking for exercise, stretching, can lifting.   Tolerated well.          2024   Social Factors   Frequency of gathering with friends or relatives Once a week   Worry food won't last until get money to buy more No   Food not last or not have enough money for food? No   Do you have housing? (Housing is defined as stable permanent housing and does not include staying ouside in a car, in a tent, in an abandoned building, in an overnight shelter, or couch-surfing.) Yes   Are you worried about losing your housing? No   Lack of transportation?  No   Unable to get utilities (heat,electricity)? No   Want help with housing or utility concern? No      (!) FINANCIAL RESOURCE STRAIN CONCERN  - answered in error.       9/9/2024   Fall Risk   Fallen 2 or more times in the past year? No   Trouble with walking or balance? No             9/8/2024   Activities of Daily Living- Home Safety   Needs help with the following daily activites None of the above   Safety concerns in the home None of the above            9/8/2024   Dental   Dentist two times every year? Yes            9/8/2024   Hearing Screening   Hearing concerns? (!) I NEED TO ASK PEOPLE TO SPEAK UP OR REPEAT THEMSELVES.    (!) TROUBLE UNDERSTANDING SOFT OR WHISPERED SPEECH.    (!) TROUBLE UNDERSTANDING SPEECH ON THE TELEPHONE    Hearing aids present.     Multiple values from one day are sorted in reverse-chronological order         9/8/2024   Driving Risk Screening   Patient/family members have concerns about driving No            9/8/2024   General Alertness/Fatigue Screening   Have you been more tired than usual lately? (!) YES    Some nights up later than desired.   Picking up hours at work.  Normally fine.          9/8/2024   Urinary Incontinence Screening   Bothered by leaking urine in past 6 months No            9/8/2024   TB Screening   Were you born outside of the [place]? No          Today's PHQ-9 Score:       9/8/2024     7:19 PM   PHQ-9 SCORE   PHQ-9 Total Score MyChart 6 (Mild depression)   PHQ-9 Total Score 6         9/8/2024   Substance Use   Alcohol more than 3/day or more than 7/wk No   Do you have a current opioid prescription? No   How severe/bad is pain from 1 to 10? 0/10 (No Pain)   Do you use any other substances recreationally? No        Social History     Tobacco Use    Smoking status: Former     Current packs/day: 0.00     Average packs/day: 2.0 packs/day for 17.0 years (33.9 ttl pk-yrs)     Types: Cigarettes     Start date: 10/20/1960     Quit date: 5/12/1977     Years since quitting:  47.3    Smokeless tobacco: Never    Tobacco comments:     Too much, thank God that's done   Vaping Use    Vaping status: Never Used   Substance Use Topics    Alcohol use: Yes     Comment: Seldom, one glass wine with a meal now and then    Drug use: Not Currently     Types: Marijuana, Amphetamines, LSD     Comment: Amph-Marij were often in 20 s, LSD one time           12/14/2022   LAST FHS-7 RESULTS   1st degree relative breast or ovarian cancer No   Any relative bilateral breast cancer Unknown   Any male have breast cancer No   Any ONE woman have BOTH breast AND ovarian cancer No   Any woman with breast cancer before 50yrs No   2 or more relatives with breast AND/OR ovarian cancer Yes   2 or more relatives with breast AND/OR bowel cancer No           Mammogram Screening - After age 74- determine frequency with patient based on health status, life expectancy and patient goals    ASCVD Risk   The 10-year ASCVD risk score (Jessica SUMNER, et al., 2019) is: 21.6%    Values used to calculate the score:      Age: 79 years      Sex: Female      Is Non- : No      Diabetic: No      Tobacco smoker: No      Systolic Blood Pressure: 134 mmHg      Is BP treated: No      HDL Cholesterol: 61 mg/dL      Total Cholesterol: 218 mg/dL          Reviewed and updated as needed this visit by Provider   Tobacco  Allergies  Meds   Med Hx  Surg Hx  Fam Hx            Past Medical History:   Diagnosis Date    Allergic rhinitis, cause unspecified     Depressive disorder Nicki Guerra    Depressive disorder, not elsewhere classified     Enthesopathy of hip region     Family history of psychiatric condition     Head injury, unspecified     Hypotension, unspecified     Lateral epicondylitis  of elbow     Premenopausal menorrhagia     Sciatica      Past Surgical History:   Procedure Laterality Date    ARTHROTOMY SHOULDER, ROTATOR CUFF REPAIR, COMBINED Left 12/21/2018    Procedure: LEFT ROTATOR CUFF REPAIR  WITH DISTAL CLAVICLE EXCISION, ACROMIOPLASTY AND BICEPS TENODESIS;  Surgeon: Dinesh Hermosillo MD;  Location: [facility] OR    CATARACT IOL, RT/LT Bilateral     COLONOSCOPY  ?    See your records    HC DILATION/CURETTAGE DIAG/THER NON OB  1969    rotator cuff repair Left 2018    Dinesh Hermosillo MD at [facility]     OB History    Para Term  AB Living   1 1 1 0 0 1   SAB IAB Ectopic Multiple Live Births   0 0 0 0 1      # Outcome Date GA Lbr Kirk/2nd Weight Sex Type Anes PTL Lv   1 Term     F    STEPHANY     BP Readings from Last 3 Encounters:   24 134/77   23 138/74   22 116/64    Wt Readings from Last 3 Encounters:   24 61.4 kg (135 lb 6.4 oz)   23 61.6 kg (135 lb 12.8 oz)   22 61.6 kg (135 lb 14.4 oz)                  Current providers sharing in care for this patient include:  Patient Care Team:  Melonie Lucia MD as PCP - General  Melonie Lucia MD as Assigned PCP    The following health maintenance items are reviewed in Epic and correct as of today:  Health Maintenance   Topic Date Due    RSV VACCINE (1 - 1-dose 60+ series) Never done    INFLUENZA VACCINE (1) 2024    DEXA  2024    ANNUAL REVIEW OF HM ORDERS  2025    PHQ-9  2025    MEDICARE ANNUAL WELLNESS VISIT  2025    FALL RISK ASSESSMENT  2025    GLUCOSE  2026    LIPID  2028    ADVANCE CARE PLANNING  2029    DTAP/TDAP/TD IMMUNIZATION (4 - Td or Tdap) 2030    HEPATITIS C SCREENING  Completed    DEPRESSION ACTION PLAN  Completed    Pneumococcal Vaccine: 65+ Years  Completed    ZOSTER IMMUNIZATION  Completed    HPV IMMUNIZATION  Aged Out    MENINGITIS IMMUNIZATION  Aged Out    RSV MONOCLONAL ANTIBODY  Aged Out    MAMMO SCREENING  Discontinued    COLORECTAL CANCER SCREENING  Discontinued    COVID-19 Vaccine  Discontinued         Review of Systems  Constitutional, HEENT, cardiovascular, pulmonary, GI, , musculoskeletal, neuro, skin,  "endocrine and psych systems are negative, except as otherwise noted.     Objective    Exam  /77 (BP Location: Right arm, Patient Position: Sitting, Cuff Size: Adult Regular)   Pulse 59   Temp 97.6  F (36.4  C) (Temporal)   Resp 18   Ht 1.613 m (5' 3.5\")   Wt 61.4 kg (135 lb 6.4 oz)   SpO2 98%   BMI 23.61 kg/m     Estimated body mass index is 23.61 kg/m  as calculated from the following:    Height as of this encounter: 1.613 m (5' 3.5\").    Weight as of this encounter: 61.4 kg (135 lb 6.4 oz).    Physical Exam  GENERAL: alert and no distress  EYES: Eyes grossly normal to inspection, PERRL and conjunctivae and sclerae normal  HENT: ear canals and TM's normal, nose and mouth without ulcers or lesions  NECK: no adenopathy, no asymmetry, masses, or scars  RESP: lungs clear to auscultation - no rales, rhonchi or wheezes  BREAST: normal without masses, tenderness or nipple discharge and no palpable axillary masses or adenopathy  CV: regular rate and rhythm, normal S1 S2, no S3 or S4, no murmur, click or rub, no peripheral edema  ABDOMEN: soft, nontender, no hepatosplenomegaly, no masses and bowel sounds normal   (female): normal female external genitalia, normal urethral meatus, normal vaginal mucosa  MS: no gross musculoskeletal defects noted, no edema  SKIN: no suspicious lesions or rashes  NEURO: Normal strength and tone, mentation intact and speech normal  PSYCH: mentation appears normal, affect normal/bright         9/9/2024   Mini Cog   Clock Draw Score 2 Normal   3 Item Recall 2 objects recalled   Mini Cog Total Score 4                 Signed Electronically by: Melonie Lucia MD    Answers submitted by the patient for this visit:  Patient Health Questionnaire (Submitted on 9/8/2024)  If you checked off any problems, how difficult have these problems made it for you to do your work, take care of things at home, or get along with other people?: Somewhat difficult  PHQ9 TOTAL SCORE: " 6              This chart was documented by provider using a voice activated software called Dragon in addition to manual typing. There may be vocabulary errors or other grammatical errors due to this.

## 2024-09-10 ENCOUNTER — TELEPHONE (OUTPATIENT)
Dept: FAMILY MEDICINE | Facility: CLINIC | Age: 79
End: 2024-09-10
Payer: COMMERCIAL

## 2024-09-10 DIAGNOSIS — Z13.6 SCREENING FOR ISCHEMIC HEART DISEASE: Primary | ICD-10-CM

## 2024-09-10 NOTE — TELEPHONE ENCOUNTER
Patient returning call.    Relayed message below from PCP and pt verbalized understanding.    Says would like to discuss the cholesterol lowering meds, but has some discussion questions. Would like to know if covered by insurance, wants to know how often she would need to have labs rechecked, and what the side effects could be.    Will route message to PCP to review and advise.     Tiffany Singh RN, BSN  [facility]

## 2024-09-10 NOTE — TELEPHONE ENCOUNTER
RN called patient and LVM to call clinic at [phone].      If patient calls back, please relay provider message below.     Cecy Hough RN

## 2024-09-12 NOTE — TELEPHONE ENCOUNTER
Writer attempted to contact Nicki  on 9/12/24        Reason for call - Provider message  Writer left message for patient to call [facility] back at [phone].        If patient calls back:              Please relay provider message below as written.     Miles CHAPPELL RN  Clinical Triage/Primary Care  [facility]

## 2024-09-12 NOTE — TELEPHONE ENCOUNTER
Pt calling back.    Relayed provider message below.    Pt states that upon doing a little more research, she would like to have a coronary calcium CT scan before she commits to going on a statin.    Will route message to PCP to review request. Pt advised if order is placed, she should receive a call within 2 business days to schedule the test.    Tiffany Singh RN, BSN  [facility]

## 2024-09-12 NOTE — TELEPHONE ENCOUNTER
Please call patient:  if we were to use medication, I would recommend Crestor 5 mg daily.      I expect it to be covered by insurance - fairly low copay if any.    Recheck lab would be done yearly.  Could be rechecked sooner in the first year if desired to see the effect of medication.    Most people tolerate medication ok.  Generally, all medication is processed through kidneys or liver.  This one is processed through liver so occasionally an increase in liver enzymes is noted.  We monitor this yearly too.  No need to change medication if mild increase in liver testing noted.    The other side effects noted by people can be muscle aching.  No joint pain but more muscular discomfort.    Rarely, severe enough to stop medication.  Symptoms resolve after stopping medication.      PSK

## 2024-09-13 NOTE — TELEPHONE ENCOUNTER
Please call patient: Orders placed for a coronary artery calcium scan.  As far as I know this will not generate a call from radiology for her to schedule.  She will need to call to set up that appointment.   [address] to set up this appointment     This is not always covered by insurance so she may want to verify coverage prior to completing the appointment.      TRICIA

## 2024-09-13 NOTE — TELEPHONE ENCOUNTER
Left message for patient to return call to clinic.     Clinic RN, if patient returns call, please review message from Melonie Lucia MD as shown below:      Mainor Ferris, RN, BSN

## 2024-09-16 NOTE — TELEPHONE ENCOUNTER
Provider message reviewed with patient. Phone number to schedule provided. Mainor Ferris, RN, BSN

## 2024-10-09 ASSESSMENT — PATIENT HEALTH QUESTIONNAIRE - PHQ9: SUM OF ALL RESPONSES TO PHQ QUESTIONS 1-9: 2

## 2024-10-16 ENCOUNTER — ANCILLARY PROCEDURE (OUTPATIENT)
Dept: CT IMAGING | Facility: CLINIC | Age: 79
End: 2024-10-16
Attending: FAMILY MEDICINE
Payer: COMMERCIAL

## 2024-10-16 DIAGNOSIS — Z13.6 SCREENING FOR ISCHEMIC HEART DISEASE: ICD-10-CM

## 2024-10-16 PROCEDURE — 75571 CT HRT W/O DYE W/CA TEST: CPT | Mod: GA | Performed by: INTERNAL MEDICINE

## 2024-10-23 ENCOUNTER — MYC MEDICAL ADVICE (OUTPATIENT)
Dept: FAMILY MEDICINE | Facility: CLINIC | Age: 79
End: 2024-10-23
Payer: COMMERCIAL

## 2024-10-23 DIAGNOSIS — F32.1 MODERATE MAJOR DEPRESSION (H): ICD-10-CM

## 2024-10-23 DIAGNOSIS — E87.1 HYPONATREMIA: Primary | ICD-10-CM

## 2024-10-23 DIAGNOSIS — F41.9 ANXIETY: ICD-10-CM

## 2024-10-30 ENCOUNTER — ANCILLARY PROCEDURE (OUTPATIENT)
Dept: MAMMOGRAPHY | Facility: CLINIC | Age: 79
End: 2024-10-30
Attending: FAMILY MEDICINE
Payer: COMMERCIAL

## 2024-10-30 DIAGNOSIS — Z12.31 ENCOUNTER FOR SCREENING MAMMOGRAM FOR BREAST CANCER: ICD-10-CM

## 2024-10-30 PROCEDURE — 77063 BREAST TOMOSYNTHESIS BI: CPT | Mod: GC

## 2024-10-30 PROCEDURE — 77067 SCR MAMMO BI INCL CAD: CPT | Mod: GC

## 2024-11-06 ENCOUNTER — ANCILLARY PROCEDURE (OUTPATIENT)
Dept: BONE DENSITY | Facility: CLINIC | Age: 79
End: 2024-11-06
Attending: FAMILY MEDICINE
Payer: COMMERCIAL

## 2024-11-06 DIAGNOSIS — Z78.0 POST-MENOPAUSAL: ICD-10-CM

## 2024-11-06 DIAGNOSIS — M81.0 AGE-RELATED OSTEOPOROSIS WITHOUT CURRENT PATHOLOGICAL FRACTURE: ICD-10-CM

## 2024-11-06 PROCEDURE — 77080 DXA BONE DENSITY AXIAL: CPT | Performed by: RADIOLOGY

## 2024-11-12 ENCOUNTER — MYC MEDICAL ADVICE (OUTPATIENT)
Dept: FAMILY MEDICINE | Facility: CLINIC | Age: 79
End: 2024-11-12
Payer: COMMERCIAL

## 2024-11-12 DIAGNOSIS — M80.00XA OSTEOPOROSIS WITH CURRENT PATHOLOGICAL FRACTURE, UNSPECIFIED OSTEOPOROSIS TYPE, INITIAL ENCOUNTER: Primary | ICD-10-CM

## 2024-12-16 ENCOUNTER — TRANSFERRED RECORDS (OUTPATIENT)
Dept: HEALTH INFORMATION MANAGEMENT | Facility: CLINIC | Age: 79
End: 2024-12-16
Payer: COMMERCIAL

## 2025-01-10 ENCOUNTER — TRANSFERRED RECORDS (OUTPATIENT)
Dept: HEALTH INFORMATION MANAGEMENT | Facility: CLINIC | Age: 80
End: 2025-01-10
Payer: COMMERCIAL

## 2025-05-10 DIAGNOSIS — F41.9 ANXIETY: ICD-10-CM

## 2025-05-10 DIAGNOSIS — F32.1 MODERATE MAJOR DEPRESSION (H): ICD-10-CM

## 2025-05-13 NOTE — TELEPHONE ENCOUNTER
2/14/2024     6:41 AM 2/14/2024     6:46 AM 9/8/2024     7:19 PM   PHQ   PHQ-9 Total Score 2 1 6   Q9: Thoughts of better off dead/self-harm past 2 weeks Not at all  Not at all Not at all       Proxy-reported     Refill sent.  Follow up needed for additional refills - message on script.     PSK

## 2025-06-09 DIAGNOSIS — F41.9 ANXIETY: ICD-10-CM

## 2025-06-09 DIAGNOSIS — F32.1 MODERATE MAJOR DEPRESSION (H): ICD-10-CM

## 2025-07-10 ENCOUNTER — NURSE TRIAGE (OUTPATIENT)
Dept: FAMILY MEDICINE | Facility: CLINIC | Age: 80
End: 2025-07-10
Payer: COMMERCIAL
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
## 2025-07-14 ENCOUNTER — OFFICE VISIT (OUTPATIENT)
Dept: FAMILY MEDICINE | Facility: CLINIC | Age: 80
End: 2025-07-14
Payer: COMMERCIAL

## 2025-07-14 VITALS
HEART RATE: 62 BPM | SYSTOLIC BLOOD PRESSURE: 130 MMHG | TEMPERATURE: 97.6 F | RESPIRATION RATE: 21 BRPM | BODY MASS INDEX: 24.29 KG/M2 | WEIGHT: 139.33 LBS | DIASTOLIC BLOOD PRESSURE: 80 MMHG | OXYGEN SATURATION: 95 %

## 2025-07-14 DIAGNOSIS — E78.5 HYPERLIPIDEMIA LDL GOAL <130: Primary | ICD-10-CM

## 2025-07-14 DIAGNOSIS — F32.1 MODERATE MAJOR DEPRESSION (H): ICD-10-CM

## 2025-07-14 DIAGNOSIS — F41.9 ANXIETY: ICD-10-CM

## 2025-07-14 DIAGNOSIS — J40 BRONCHITIS: ICD-10-CM

## 2025-07-14 PROCEDURE — 99214 OFFICE O/P EST MOD 30 MIN: CPT | Performed by: INTERNAL MEDICINE

## 2025-07-14 PROCEDURE — 3075F SYST BP GE 130 - 139MM HG: CPT | Performed by: INTERNAL MEDICINE

## 2025-07-14 PROCEDURE — 3078F DIAST BP <80 MM HG: CPT | Performed by: INTERNAL MEDICINE

## 2025-07-14 RX ORDER — AZITHROMYCIN 250 MG/1
TABLET, FILM COATED ORAL
Qty: 6 TABLET | Refills: 0 | Status: SHIPPED | OUTPATIENT
Start: 2025-07-14 | End: 2025-07-19

## 2025-07-14 ASSESSMENT — ANXIETY QUESTIONNAIRES
2. NOT BEING ABLE TO STOP OR CONTROL WORRYING: NOT AT ALL
GAD7 TOTAL SCORE: 2
5. BEING SO RESTLESS THAT IT IS HARD TO SIT STILL: NOT AT ALL
IF YOU CHECKED OFF ANY PROBLEMS ON THIS QUESTIONNAIRE, HOW DIFFICULT HAVE THESE PROBLEMS MADE IT FOR YOU TO DO YOUR WORK, TAKE CARE OF THINGS AT HOME, OR GET ALONG WITH OTHER PEOPLE: NOT DIFFICULT AT ALL
6. BECOMING EASILY ANNOYED OR IRRITABLE: NOT AT ALL
1. FEELING NERVOUS, ANXIOUS, OR ON EDGE: SEVERAL DAYS
7. FEELING AFRAID AS IF SOMETHING AWFUL MIGHT HAPPEN: NOT AT ALL
GAD7 TOTAL SCORE: 2
3. WORRYING TOO MUCH ABOUT DIFFERENT THINGS: NOT AT ALL

## 2025-07-14 ASSESSMENT — ENCOUNTER SYMPTOMS: COUGH: 1

## 2025-07-14 ASSESSMENT — PATIENT HEALTH QUESTIONNAIRE - PHQ9
5. POOR APPETITE OR OVEREATING: SEVERAL DAYS
SUM OF ALL RESPONSES TO PHQ QUESTIONS 1-9: 7
10. IF YOU CHECKED OFF ANY PROBLEMS, HOW DIFFICULT HAVE THESE PROBLEMS MADE IT FOR YOU TO DO YOUR WORK, TAKE CARE OF THINGS AT HOME, OR GET ALONG WITH OTHER PEOPLE: SOMEWHAT DIFFICULT
SUM OF ALL RESPONSES TO PHQ QUESTIONS 1-9: 7
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
## 2025-07-29 ENCOUNTER — TELEPHONE (OUTPATIENT)
Dept: FAMILY MEDICINE | Facility: CLINIC | Age: 80
End: 2025-07-29
Payer: COMMERCIAL

## 2025-07-29 DIAGNOSIS — R05.9 COUGH: Primary | ICD-10-CM

## 2025-07-29 NOTE — TELEPHONE ENCOUNTER
01/04/24                            Jeffry La  [address]  [place] [address]    To Whom It May Concern:    This is to certify Jeffry La was evaluated with Aranza Kay NP on 01/04/24 and can return to regular work on January 12, 2024.     RESTRICTIONS: wear mask if symtpomatic            Electronically signed by:  Aranza Kay NP  [facility] [place]--[address] [place] [address]  Dept Phone: [phone]        Attempt # 1  Left message for patient to call clinic at: [phone] to confirm pharmacy of choice.    RN may send prescription once pharmacy known per Dr. Shaw. Please let patient know she should take the prednisone in the morning with food.

## 2025-07-29 NOTE — TELEPHONE ENCOUNTER
No pharmacy selected  I loaded Rx for prednisone that can be sent by RN once pharmacy known.     Please let patient know she should take the prednisone in the morning with food.

## 2025-07-29 NOTE — TELEPHONE ENCOUNTER
Patient calling in stating she still is having her cough.  It did improve slightly but not enough for the patient.  She wasn't able to afford the steroid inhaler as it was $300.      Patient is now asking for oral steroids as Dr. Frank had mentioned in his OV note on 7/14/25.    Routing to provider to review and advise.    Kristina Kjellberg, MSN, RN  [facility] Primary Care Triage

## 2025-07-30 RX ORDER — PREDNISONE 20 MG/1
40 TABLET ORAL DAILY
Qty: 10 TABLET | Refills: 0 | Status: SHIPPED | OUTPATIENT
Start: 2025-07-30

## 2025-07-30 NOTE — TELEPHONE ENCOUNTER
Left message on answering machine for patient to call back to [phone]    RN please give patient provider message as written.  Lyubov GUYN, RN

## 2025-07-30 NOTE — TELEPHONE ENCOUNTER
Pharmacy information entered, only one favored pharmacy in chart. Prescription sent per Carola Gutierrez MD, patient notified via [employer].     No pharmacy selected  I loaded Rx for prednisone that can be sent by RN once pharmacy known.      Please let patient know she should take the prednisone in the morning with food.     Ramandeep Ferris RN, BSN, PHN

## 2025-08-22 ENCOUNTER — TRANSFERRED RECORDS (OUTPATIENT)
Dept: HEALTH INFORMATION MANAGEMENT | Facility: CLINIC | Age: 80
End: 2025-08-22
Payer: COMMERCIAL

## (undated) DEVICE — NDL 19GA 1.5"

## (undated) DEVICE — GLOVE PROTEXIS W/NEU-THERA 7.0  2D73TE70

## (undated) DEVICE — GLOVE PROTEXIS W/NEU-THERA 7.5  2D73TE75

## (undated) DEVICE — BLADE SAW LONG WIDEÂ 13MMX34.5MM 2296-003-106

## (undated) DEVICE — SU NDL MAYO 1/2 CIRCLE TROCAR 1826-4D

## (undated) DEVICE — SU VICRYL 0 CT-1 27" UND J260H

## (undated) DEVICE — DRSG ABDOMINAL 07 1/2X8" 7197D

## (undated) DEVICE — SU NDL MAYO TROCAR SZ 5 1826-5D

## (undated) DEVICE — GLOVE PROTEXIS BLUE W/NEU-THERA 7.5  2D73EB75

## (undated) DEVICE — SU VICRYL 1 CT-1 27" J341H

## (undated) DEVICE — DRAPE STOCKINETTE IMPERVIOUS 12" 1587

## (undated) DEVICE — IMM KIT SHOULDER STABILIZATION 7210573

## (undated) DEVICE — DRAPE IOBAN INCISE 23X17" 6650EZ

## (undated) DEVICE — SU VICRYL 2-0 SH 27" UND J417H

## (undated) DEVICE — PREP CHLORAPREP 26ML TINTED ORANGE  260815

## (undated) DEVICE — DRSG STERI STRIP 1/2X4" R1547

## (undated) DEVICE — NDL BLUNT 18GA 1.5" FILTER 305211

## (undated) DEVICE — NDL 22GA 1.5"

## (undated) DEVICE — PACK EXTREMITY SOP15EXFSD

## (undated) DEVICE — Device

## (undated) DEVICE — GLOVE PROTEXIS POWDER FREE 7.5 ORTHOPEDIC 2D73ET75

## (undated) DEVICE — DRSG GAUZE 4X4" TRAY 6939

## (undated) DEVICE — DRAPE STERI TOWEL SM 1000

## (undated) DEVICE — STRAP STIRRUP W/SLIP 30187-030

## (undated) DEVICE — SUCTION TIP YANKAUER W/O VENT K86

## (undated) DEVICE — SOL WATER IRRIG 1000ML BOTTLE 07139-09

## (undated) DEVICE — SU PROLENE 3-0 PS-2 18" 8687H

## (undated) RX ORDER — CLINDAMYCIN PHOSPHATE 150 MG/ML
INJECTION, SOLUTION INTRAVENOUS
Status: DISPENSED
Start: 2018-12-21

## (undated) RX ORDER — FENTANYL CITRATE 50 UG/ML
INJECTION, SOLUTION INTRAMUSCULAR; INTRAVENOUS
Status: DISPENSED
Start: 2018-12-21

## (undated) RX ORDER — ONDANSETRON 2 MG/ML
INJECTION INTRAMUSCULAR; INTRAVENOUS
Status: DISPENSED
Start: 2018-12-21

## (undated) RX ORDER — GLYCOPYRROLATE 0.2 MG/ML
INJECTION INTRAMUSCULAR; INTRAVENOUS
Status: DISPENSED
Start: 2018-12-21

## (undated) RX ORDER — ACETAMINOPHEN 325 MG/1
TABLET ORAL
Status: DISPENSED
Start: 2018-12-21

## (undated) RX ORDER — PROPOFOL 10 MG/ML
INJECTION, EMULSION INTRAVENOUS
Status: DISPENSED
Start: 2018-12-21

## (undated) RX ORDER — OXYCODONE HYDROCHLORIDE 5 MG/1
TABLET ORAL
Status: DISPENSED
Start: 2018-12-21